# Patient Record
Sex: FEMALE | Race: WHITE | HISPANIC OR LATINO | URBAN - METROPOLITAN AREA
[De-identification: names, ages, dates, MRNs, and addresses within clinical notes are randomized per-mention and may not be internally consistent; named-entity substitution may affect disease eponyms.]

---

## 2017-05-03 ENCOUNTER — EMERGENCY (EMERGENCY)
Facility: HOSPITAL | Age: 77
LOS: 0 days | Discharge: HOME | End: 2017-05-04

## 2017-06-15 PROBLEM — Z00.00 ENCOUNTER FOR PREVENTIVE HEALTH EXAMINATION: Status: ACTIVE | Noted: 2017-06-15

## 2017-06-21 ENCOUNTER — APPOINTMENT (OUTPATIENT)
Dept: CARDIOLOGY | Facility: CLINIC | Age: 77
End: 2017-06-21

## 2017-06-28 DIAGNOSIS — E11.9 TYPE 2 DIABETES MELLITUS WITHOUT COMPLICATIONS: ICD-10-CM

## 2017-06-28 DIAGNOSIS — Z94.4 LIVER TRANSPLANT STATUS: ICD-10-CM

## 2017-06-28 DIAGNOSIS — R11.2 NAUSEA WITH VOMITING, UNSPECIFIED: ICD-10-CM

## 2017-06-28 DIAGNOSIS — Z79.4 LONG TERM (CURRENT) USE OF INSULIN: ICD-10-CM

## 2017-06-28 DIAGNOSIS — R63.0 ANOREXIA: ICD-10-CM

## 2017-06-28 DIAGNOSIS — I10 ESSENTIAL (PRIMARY) HYPERTENSION: ICD-10-CM

## 2017-06-28 DIAGNOSIS — E03.9 HYPOTHYROIDISM, UNSPECIFIED: ICD-10-CM

## 2017-06-28 DIAGNOSIS — I25.10 ATHEROSCLEROTIC HEART DISEASE OF NATIVE CORONARY ARTERY WITHOUT ANGINA PECTORIS: ICD-10-CM

## 2017-06-28 DIAGNOSIS — Z79.899 OTHER LONG TERM (CURRENT) DRUG THERAPY: ICD-10-CM

## 2017-06-28 DIAGNOSIS — R25.1 TREMOR, UNSPECIFIED: ICD-10-CM

## 2017-06-28 DIAGNOSIS — Z95.0 PRESENCE OF CARDIAC PACEMAKER: ICD-10-CM

## 2018-04-03 ENCOUNTER — APPOINTMENT (OUTPATIENT)
Dept: CARDIOLOGY | Facility: CLINIC | Age: 78
End: 2018-04-03

## 2018-04-03 VITALS
DIASTOLIC BLOOD PRESSURE: 90 MMHG | BODY MASS INDEX: 27.82 KG/M2 | HEIGHT: 63 IN | WEIGHT: 157 LBS | HEART RATE: 64 BPM | SYSTOLIC BLOOD PRESSURE: 128 MMHG

## 2018-04-03 DIAGNOSIS — I25.10 ATHEROSCLEROTIC HEART DISEASE OF NATIVE CORONARY ARTERY W/OUT ANGINA PECTORIS: ICD-10-CM

## 2018-04-03 DIAGNOSIS — Z95.0 PRESENCE OF CARDIAC PACEMAKER: ICD-10-CM

## 2018-04-03 RX ORDER — METOPROLOL TARTRATE 50 MG/1
50 TABLET, FILM COATED ORAL DAILY
Refills: 0 | Status: DISCONTINUED | COMMUNITY
End: 2018-04-03

## 2018-04-11 ENCOUNTER — EMERGENCY (EMERGENCY)
Facility: HOSPITAL | Age: 78
LOS: 0 days | Discharge: HOME | End: 2018-04-11
Attending: EMERGENCY MEDICINE

## 2018-04-11 VITALS
TEMPERATURE: 97 F | DIASTOLIC BLOOD PRESSURE: 77 MMHG | RESPIRATION RATE: 18 BRPM | OXYGEN SATURATION: 99 % | SYSTOLIC BLOOD PRESSURE: 167 MMHG | HEART RATE: 78 BPM

## 2018-04-11 VITALS
HEART RATE: 80 BPM | HEIGHT: 63 IN | RESPIRATION RATE: 18 BRPM | SYSTOLIC BLOOD PRESSURE: 194 MMHG | TEMPERATURE: 97 F | WEIGHT: 154.1 LBS | DIASTOLIC BLOOD PRESSURE: 92 MMHG
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
## 2018-04-11 DIAGNOSIS — S80.212A ABRASION, LEFT KNEE, INITIAL ENCOUNTER: ICD-10-CM

## 2018-04-11 DIAGNOSIS — E11.9 TYPE 2 DIABETES MELLITUS WITHOUT COMPLICATIONS: ICD-10-CM
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
## 2018-04-11 DIAGNOSIS — E03.9 HYPOTHYROIDISM, UNSPECIFIED: ICD-10-CM

## 2018-04-11 DIAGNOSIS — M79.642 PAIN IN LEFT HAND: ICD-10-CM

## 2018-04-11 DIAGNOSIS — I10 ESSENTIAL (PRIMARY) HYPERTENSION: ICD-10-CM

## 2018-04-11 DIAGNOSIS — Z79.4 LONG TERM (CURRENT) USE OF INSULIN: ICD-10-CM

## 2018-04-11 DIAGNOSIS — M25.512 PAIN IN LEFT SHOULDER: ICD-10-CM
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
## 2018-04-11 DIAGNOSIS — Z79.899 OTHER LONG TERM (CURRENT) DRUG THERAPY: ICD-10-CM

## 2018-04-11 NOTE — ED PROVIDER NOTE - PROGRESS NOTE DETAILS
I personally evaluated the patient. I reviewed the Resident’s or Physician Assistant’s note (as assigned above), and agree with the findings and plan except as documented in my note.  77yF hx of DM, pacemaker, liver transplant pt, presents to ED for eval s/p fall.  Pt states she was getting out of her car and she tripped over curb, fell to the left.  Pt landed on left sided and struck left face on ground.  No LOC.  No headache.  No nausea, vomiting.  No preceding dizziness or lightheadedness.  ON EXAM:  Pt is awake and alert, well appearing.  GCS 15.  PERRL, EOMI.  Ambulating with steady gait.  CVS RRR.  Resp CTA b/l.  No midline vertebral tenderness.  No stepoff.  No rib tenderness.  No ecchymosis to back wall, chest wall, or abd wall.  No shortening of LE.  No hip tenderness, pelvis is stable.  Full ROM at shoulders, elbows and wrists.  Tenderness over base of left thumb and dorsum of hand.  NVI.  PLAN:  Analgesia, CT, and re eval.

## 2018-04-11 NOTE — ED PROVIDER NOTE - NS ED ROS FT
Eyes:  No visual changes, eye pain or discharge.  ENMT:  , no sore throat or runny nose, no difficulty swallowing  Cardiac:  No chest pain, SOB  Respiratory:  No cough or respiratory distress.   GI:  No nausea, vomiting, diarrhea or abdominal pain.  :  No dysuria, frequency or burning.  MS:  left knee, left hand and shoulder pain, full range of motion able to ambulate   Neuro:  No headache, fell onto front of face, no loc. no weakness, no dizziness   Skin:  abrasion to the left knee

## 2018-04-11 NOTE — ED PROVIDER NOTE - OBJECTIVE STATEMENT
78 yo F pmh of DM, hypothyroid, liver transplant, HTN presents after a mechanical fall. was getting out of her car when she tripped on the curb and fell forward. Landed on left knee with abrasion, left shoulder and left hand. Also front of left face was hit, no skull injury, no loc. only on asprin. no dizziness, no cp, no sob, no blurry vision, no n/v, no abd pain, was able to ambulate since fall.

## 2018-04-11 NOTE — ED PROVIDER NOTE - PHYSICAL EXAMINATION
CONSTITUTIONAL: Well-developed; well-nourished; in no acute distress.   SKIN: non bleeding abrasion to the left knee   HEAD: Normocephalic; atraumatic. mild edema to the left maxilla  EYES: PERRL, EOMI, no conjunctival erythema  ENT: No nasal discharge; airway clear.  NECK: Supple; non tender.  CARD: S1, S2 normal;  Regular rate and rhythm.   RESP: No wheezes, rales or rhonchi.  ABD: soft ntnd  EXT: Normal ROM.  able to ambulate unasistent, full range of motion of left shoulder, left hand and left knee without point tenderness or swelling.   LYMPH: No acute cervical adenopathy.  NEURO: Alert, oriented, grossly unremarkable, CN 2-11 intact, 5/5 strength in all 4 extremities, equal sensation bilaterally

## 2018-05-01 ENCOUNTER — APPOINTMENT (OUTPATIENT)
Dept: CARDIOLOGY | Facility: CLINIC | Age: 78
End: 2018-05-01

## 2018-07-31 ENCOUNTER — APPOINTMENT (OUTPATIENT)
Dept: CARDIOLOGY | Facility: CLINIC | Age: 78
End: 2018-07-31

## 2019-07-09 PROBLEM — E03.9 HYPOTHYROIDISM, UNSPECIFIED: Chronic | Status: ACTIVE | Noted: 2018-04-11

## 2019-07-09 PROBLEM — E11.9 TYPE 2 DIABETES MELLITUS WITHOUT COMPLICATIONS: Chronic | Status: ACTIVE | Noted: 2018-04-11

## 2019-07-09 PROBLEM — Z94.4 LIVER TRANSPLANT STATUS: Chronic | Status: ACTIVE | Noted: 2018-04-11

## 2019-07-25 ENCOUNTER — APPOINTMENT (OUTPATIENT)
Dept: CARDIOLOGY | Facility: CLINIC | Age: 79
End: 2019-07-25
Payer: MEDICARE

## 2019-07-25 PROCEDURE — 93000 ELECTROCARDIOGRAM COMPLETE: CPT

## 2019-07-25 PROCEDURE — 99204 OFFICE O/P NEW MOD 45 MIN: CPT

## 2019-07-29 ENCOUNTER — APPOINTMENT (OUTPATIENT)
Dept: CARDIOLOGY | Facility: CLINIC | Age: 79
End: 2019-07-29

## 2019-07-31 ENCOUNTER — APPOINTMENT (OUTPATIENT)
Dept: CARDIOLOGY | Facility: CLINIC | Age: 79
End: 2019-07-31
Payer: MEDICARE

## 2019-07-31 PROCEDURE — 93306 TTE W/DOPPLER COMPLETE: CPT

## 2019-08-14 ENCOUNTER — OUTPATIENT (OUTPATIENT)
Dept: OUTPATIENT SERVICES | Facility: HOSPITAL | Age: 79
LOS: 1 days | Discharge: HOME | End: 2019-08-14
Payer: MEDICARE

## 2019-08-14 VITALS
WEIGHT: 141.1 LBS | OXYGEN SATURATION: 100 % | HEIGHT: 62 IN | DIASTOLIC BLOOD PRESSURE: 81 MMHG | SYSTOLIC BLOOD PRESSURE: 147 MMHG | RESPIRATION RATE: 20 BRPM | HEART RATE: 90 BPM | TEMPERATURE: 98 F

## 2019-08-14 DIAGNOSIS — Z95.0 PRESENCE OF CARDIAC PACEMAKER: ICD-10-CM

## 2019-08-14 DIAGNOSIS — Z94.4 LIVER TRANSPLANT STATUS: Chronic | ICD-10-CM

## 2019-08-14 DIAGNOSIS — Z01.818 ENCOUNTER FOR OTHER PREPROCEDURAL EXAMINATION: ICD-10-CM

## 2019-08-14 DIAGNOSIS — Z95.0 PRESENCE OF CARDIAC PACEMAKER: Chronic | ICD-10-CM

## 2019-08-14 LAB
ALBUMIN SERPL ELPH-MCNC: 4.5 G/DL — SIGNIFICANT CHANGE UP (ref 3.5–5.2)
ALP SERPL-CCNC: 91 U/L — SIGNIFICANT CHANGE UP (ref 30–115)
ALT FLD-CCNC: 10 U/L — SIGNIFICANT CHANGE UP (ref 0–41)
ANION GAP SERPL CALC-SCNC: 11 MMOL/L — SIGNIFICANT CHANGE UP (ref 7–14)
APTT BLD: 29 SEC — SIGNIFICANT CHANGE UP (ref 27–39.2)
AST SERPL-CCNC: 14 U/L — SIGNIFICANT CHANGE UP (ref 0–41)
BASOPHILS # BLD AUTO: 0.03 K/UL — SIGNIFICANT CHANGE UP (ref 0–0.2)
BASOPHILS NFR BLD AUTO: 0.4 % — SIGNIFICANT CHANGE UP (ref 0–1)
BILIRUB SERPL-MCNC: 0.5 MG/DL — SIGNIFICANT CHANGE UP (ref 0.2–1.2)
BUN SERPL-MCNC: 21 MG/DL — HIGH (ref 10–20)
CALCIUM SERPL-MCNC: 10.7 MG/DL — HIGH (ref 8.5–10.1)
CHLORIDE SERPL-SCNC: 99 MMOL/L — SIGNIFICANT CHANGE UP (ref 98–110)
CO2 SERPL-SCNC: 29 MMOL/L — SIGNIFICANT CHANGE UP (ref 17–32)
CREAT SERPL-MCNC: 1 MG/DL — SIGNIFICANT CHANGE UP (ref 0.7–1.5)
EOSINOPHIL # BLD AUTO: 0.33 K/UL — SIGNIFICANT CHANGE UP (ref 0–0.7)
EOSINOPHIL NFR BLD AUTO: 4.2 % — SIGNIFICANT CHANGE UP (ref 0–8)
ESTIMATED AVERAGE GLUCOSE: 183 MG/DL — HIGH (ref 68–114)
GLUCOSE SERPL-MCNC: 152 MG/DL — HIGH (ref 70–99)
HBA1C BLD-MCNC: 8 % — HIGH (ref 4–5.6)
HCT VFR BLD CALC: 40.1 % — SIGNIFICANT CHANGE UP (ref 37–47)
HGB BLD-MCNC: 13.1 G/DL — SIGNIFICANT CHANGE UP (ref 12–16)
IMM GRANULOCYTES NFR BLD AUTO: 0.3 % — SIGNIFICANT CHANGE UP (ref 0.1–0.3)
INR BLD: 1.1 RATIO — SIGNIFICANT CHANGE UP (ref 0.65–1.3)
LYMPHOCYTES # BLD AUTO: 1.93 K/UL — SIGNIFICANT CHANGE UP (ref 1.2–3.4)
LYMPHOCYTES # BLD AUTO: 24.7 % — SIGNIFICANT CHANGE UP (ref 20.5–51.1)
MCHC RBC-ENTMCNC: 28.5 PG — SIGNIFICANT CHANGE UP (ref 27–31)
MCHC RBC-ENTMCNC: 32.7 G/DL — SIGNIFICANT CHANGE UP (ref 32–37)
MCV RBC AUTO: 87.2 FL — SIGNIFICANT CHANGE UP (ref 81–99)
MONOCYTES # BLD AUTO: 0.73 K/UL — HIGH (ref 0.1–0.6)
MONOCYTES NFR BLD AUTO: 9.3 % — SIGNIFICANT CHANGE UP (ref 1.7–9.3)
NEUTROPHILS # BLD AUTO: 4.78 K/UL — SIGNIFICANT CHANGE UP (ref 1.4–6.5)
NEUTROPHILS NFR BLD AUTO: 61.1 % — SIGNIFICANT CHANGE UP (ref 42.2–75.2)
NRBC # BLD: 0 /100 WBCS — SIGNIFICANT CHANGE UP (ref 0–0)
PLATELET # BLD AUTO: 170 K/UL — SIGNIFICANT CHANGE UP (ref 130–400)
POTASSIUM SERPL-MCNC: 5.1 MMOL/L — HIGH (ref 3.5–5)
POTASSIUM SERPL-SCNC: 5.1 MMOL/L — HIGH (ref 3.5–5)
PROT SERPL-MCNC: 7.4 G/DL — SIGNIFICANT CHANGE UP (ref 6–8)
PROTHROM AB SERPL-ACNC: 12.6 SEC — SIGNIFICANT CHANGE UP (ref 9.95–12.87)
RBC # BLD: 4.6 M/UL — SIGNIFICANT CHANGE UP (ref 4.2–5.4)
RBC # FLD: 14.6 % — HIGH (ref 11.5–14.5)
SODIUM SERPL-SCNC: 139 MMOL/L — SIGNIFICANT CHANGE UP (ref 135–146)
WBC # BLD: 7.82 K/UL — SIGNIFICANT CHANGE UP (ref 4.8–10.8)
WBC # FLD AUTO: 7.82 K/UL — SIGNIFICANT CHANGE UP (ref 4.8–10.8)

## 2019-08-14 PROCEDURE — 93010 ELECTROCARDIOGRAM REPORT: CPT

## 2019-08-14 RX ORDER — TACROLIMUS 5 MG/1
1 CAPSULE ORAL
Qty: 0 | Refills: 0 | DISCHARGE

## 2019-08-14 RX ORDER — METOPROLOL TARTRATE 50 MG
0 TABLET ORAL
Qty: 0 | Refills: 0 | DISCHARGE

## 2019-08-14 RX ORDER — LOSARTAN POTASSIUM 100 MG/1
1 TABLET, FILM COATED ORAL
Qty: 0 | Refills: 0 | DISCHARGE

## 2019-08-14 RX ORDER — INSULIN ASPART 100 [IU]/ML
0 INJECTION, SOLUTION SUBCUTANEOUS
Qty: 0 | Refills: 0 | DISCHARGE

## 2019-08-14 RX ORDER — LEVOTHYROXINE SODIUM 125 MCG
1 TABLET ORAL
Qty: 0 | Refills: 0 | DISCHARGE

## 2019-08-14 RX ORDER — INSULIN GLARGINE 100 [IU]/ML
4 INJECTION, SOLUTION SUBCUTANEOUS
Qty: 0 | Refills: 0 | DISCHARGE

## 2019-08-14 RX ORDER — INSULIN GLARGINE 100 [IU]/ML
0 INJECTION, SOLUTION SUBCUTANEOUS
Qty: 0 | Refills: 0 | DISCHARGE

## 2019-08-14 NOTE — H&P PST ADULT - NSICDXPASTSURGICALHX_GEN_ALL_CORE_FT
PAST SURGICAL HISTORY:  No significant past surgical history PAST SURGICAL HISTORY:  Liver transplanted     Pacemaker medtronic

## 2019-08-14 NOTE — H&P PST ADULT - HISTORY OF PRESENT ILLNESS
78 y/o female scheduled for left heart cath pt reports h/o abnormal stress test  reports no c/o cp,sob,palpitations,cough or dysria  1-2 fos without sob

## 2019-08-14 NOTE — H&P PST ADULT - NSICDXPASTMEDICALHX_GEN_ALL_CORE_FT
PAST MEDICAL HISTORY:  Diabetes     Hypothyroid     Liver transplant status PAST MEDICAL HISTORY:  Diabetes     HTN (hypertension)     Hypothyroid     Liver transplant status     Myocardial infarction     SOB (shortness of breath)

## 2019-09-02 ENCOUNTER — EMERGENCY (EMERGENCY)
Facility: HOSPITAL | Age: 79
LOS: 0 days | Discharge: HOME | End: 2019-09-02
Attending: EMERGENCY MEDICINE | Admitting: EMERGENCY MEDICINE
Payer: MEDICARE

## 2019-09-02 VITALS
OXYGEN SATURATION: 98 % | HEIGHT: 63 IN | SYSTOLIC BLOOD PRESSURE: 177 MMHG | DIASTOLIC BLOOD PRESSURE: 84 MMHG | RESPIRATION RATE: 16 BRPM | WEIGHT: 139.99 LBS | HEART RATE: 85 BPM | TEMPERATURE: 96 F

## 2019-09-02 DIAGNOSIS — Z95.0 PRESENCE OF CARDIAC PACEMAKER: Chronic | ICD-10-CM

## 2019-09-02 DIAGNOSIS — Z94.4 LIVER TRANSPLANT STATUS: Chronic | ICD-10-CM

## 2019-09-02 DIAGNOSIS — Z95.5 PRESENCE OF CORONARY ANGIOPLASTY IMPLANT AND GRAFT: Chronic | ICD-10-CM

## 2019-09-02 DIAGNOSIS — I25.10 ATHEROSCLEROTIC HEART DISEASE OF NATIVE CORONARY ARTERY WITHOUT ANGINA PECTORIS: ICD-10-CM

## 2019-09-02 DIAGNOSIS — R07.89 OTHER CHEST PAIN: ICD-10-CM

## 2019-09-02 DIAGNOSIS — R07.9 CHEST PAIN, UNSPECIFIED: ICD-10-CM

## 2019-09-02 DIAGNOSIS — I10 ESSENTIAL (PRIMARY) HYPERTENSION: ICD-10-CM

## 2019-09-02 DIAGNOSIS — E11.9 TYPE 2 DIABETES MELLITUS WITHOUT COMPLICATIONS: ICD-10-CM

## 2019-09-02 DIAGNOSIS — Z95.5 PRESENCE OF CORONARY ANGIOPLASTY IMPLANT AND GRAFT: ICD-10-CM

## 2019-09-02 PROBLEM — R06.02 SHORTNESS OF BREATH: Chronic | Status: ACTIVE | Noted: 2019-08-14

## 2019-09-02 LAB
ALBUMIN SERPL ELPH-MCNC: 4.2 G/DL — SIGNIFICANT CHANGE UP (ref 3.5–5.2)
ALP SERPL-CCNC: 96 U/L — SIGNIFICANT CHANGE UP (ref 30–115)
ALT FLD-CCNC: 12 U/L — SIGNIFICANT CHANGE UP (ref 0–41)
ANION GAP SERPL CALC-SCNC: 14 MMOL/L — SIGNIFICANT CHANGE UP (ref 7–14)
APTT BLD: 23.6 SEC — CRITICAL LOW (ref 27–39.2)
AST SERPL-CCNC: 19 U/L — SIGNIFICANT CHANGE UP (ref 0–41)
BASOPHILS # BLD AUTO: 0.03 K/UL — SIGNIFICANT CHANGE UP (ref 0–0.2)
BASOPHILS NFR BLD AUTO: 0.4 % — SIGNIFICANT CHANGE UP (ref 0–1)
BILIRUB SERPL-MCNC: 0.5 MG/DL — SIGNIFICANT CHANGE UP (ref 0.2–1.2)
BUN SERPL-MCNC: 16 MG/DL — SIGNIFICANT CHANGE UP (ref 10–20)
CALCIUM SERPL-MCNC: 10.2 MG/DL — HIGH (ref 8.5–10.1)
CHLORIDE SERPL-SCNC: 96 MMOL/L — LOW (ref 98–110)
CO2 SERPL-SCNC: 25 MMOL/L — SIGNIFICANT CHANGE UP (ref 17–32)
CREAT SERPL-MCNC: 1 MG/DL — SIGNIFICANT CHANGE UP (ref 0.7–1.5)
EOSINOPHIL # BLD AUTO: 0.27 K/UL — SIGNIFICANT CHANGE UP (ref 0–0.7)
EOSINOPHIL NFR BLD AUTO: 3.5 % — SIGNIFICANT CHANGE UP (ref 0–8)
GLUCOSE SERPL-MCNC: 141 MG/DL — HIGH (ref 70–99)
HCT VFR BLD CALC: 41.2 % — SIGNIFICANT CHANGE UP (ref 37–47)
HGB BLD-MCNC: 13.3 G/DL — SIGNIFICANT CHANGE UP (ref 12–16)
IMM GRANULOCYTES NFR BLD AUTO: 0.4 % — HIGH (ref 0.1–0.3)
INR BLD: 1.13 RATIO — SIGNIFICANT CHANGE UP (ref 0.65–1.3)
LYMPHOCYTES # BLD AUTO: 1.87 K/UL — SIGNIFICANT CHANGE UP (ref 1.2–3.4)
LYMPHOCYTES # BLD AUTO: 24.3 % — SIGNIFICANT CHANGE UP (ref 20.5–51.1)
MAGNESIUM SERPL-MCNC: 1.6 MG/DL — LOW (ref 1.8–2.4)
MCHC RBC-ENTMCNC: 28.6 PG — SIGNIFICANT CHANGE UP (ref 27–31)
MCHC RBC-ENTMCNC: 32.3 G/DL — SIGNIFICANT CHANGE UP (ref 32–37)
MCV RBC AUTO: 88.6 FL — SIGNIFICANT CHANGE UP (ref 81–99)
MONOCYTES # BLD AUTO: 0.64 K/UL — HIGH (ref 0.1–0.6)
MONOCYTES NFR BLD AUTO: 8.3 % — SIGNIFICANT CHANGE UP (ref 1.7–9.3)
NEUTROPHILS # BLD AUTO: 4.87 K/UL — SIGNIFICANT CHANGE UP (ref 1.4–6.5)
NEUTROPHILS NFR BLD AUTO: 63.1 % — SIGNIFICANT CHANGE UP (ref 42.2–75.2)
NRBC # BLD: 0 /100 WBCS — SIGNIFICANT CHANGE UP (ref 0–0)
NT-PROBNP SERPL-SCNC: 1562 PG/ML — HIGH (ref 0–300)
PLATELET # BLD AUTO: 207 K/UL — SIGNIFICANT CHANGE UP (ref 130–400)
POTASSIUM SERPL-MCNC: 4.9 MMOL/L — SIGNIFICANT CHANGE UP (ref 3.5–5)
POTASSIUM SERPL-SCNC: 4.9 MMOL/L — SIGNIFICANT CHANGE UP (ref 3.5–5)
PROT SERPL-MCNC: 7.5 G/DL — SIGNIFICANT CHANGE UP (ref 6–8)
PROTHROM AB SERPL-ACNC: 13 SEC — HIGH (ref 9.95–12.87)
RBC # BLD: 4.65 M/UL — SIGNIFICANT CHANGE UP (ref 4.2–5.4)
RBC # FLD: 14.4 % — SIGNIFICANT CHANGE UP (ref 11.5–14.5)
SODIUM SERPL-SCNC: 135 MMOL/L — SIGNIFICANT CHANGE UP (ref 135–146)
TROPONIN T SERPL-MCNC: <0.01 NG/ML — SIGNIFICANT CHANGE UP
TROPONIN T SERPL-MCNC: <0.01 NG/ML — SIGNIFICANT CHANGE UP
WBC # BLD: 7.71 K/UL — SIGNIFICANT CHANGE UP (ref 4.8–10.8)
WBC # FLD AUTO: 7.71 K/UL — SIGNIFICANT CHANGE UP (ref 4.8–10.8)

## 2019-09-02 PROCEDURE — 93010 ELECTROCARDIOGRAM REPORT: CPT

## 2019-09-02 PROCEDURE — 71046 X-RAY EXAM CHEST 2 VIEWS: CPT | Mod: 26

## 2019-09-02 PROCEDURE — 99222 1ST HOSP IP/OBS MODERATE 55: CPT

## 2019-09-02 PROCEDURE — 99285 EMERGENCY DEPT VISIT HI MDM: CPT

## 2019-09-02 NOTE — ED ADULT NURSE NOTE - NSIMPLEMENTINTERV_GEN_ALL_ED
Implemented All Universal Safety Interventions:  [place] to call system. Call bell, personal items and telephone within reach. Instruct patient to call for assistance. Room bathroom lighting operational. Non-slip footwear when patient is off stretcher. Physically safe environment: no spills, clutter or unnecessary equipment. Stretcher in lowest position, wheels locked, appropriate side rails in place.

## 2019-09-02 NOTE — CONSULT NOTE ADULT - SUBJECTIVE AND OBJECTIVE BOX
Date of Admission: 9/2/2019    CHIEF COMPLAINT: Chest pain    HISTORY OF PRESENT ILLNESS:     PAST MEDICAL & SURGICAL HISTORY:  Myocardial infarction  HTN (hypertension)  SOB (shortness of breath)  Hypothyroid  Liver transplant status  Diabetes  H/O heart artery stent  Pacemaker: medtronic  Liver transplanted      FAMILY HISTORY:  [ ] no pertinent family history of premature cardiovascular disease in first degree relatives.  Mother:   Father:   Siblings:     SOCIAL HISTORY:    [ ] Non-smoker  [x] Smoker. Former smoker, quit 12 years ago.  [ ] Alcohol    Allergies    No Known Allergies    Intolerances    	    REVIEW OF SYSTEMS:  CONSTITUTIONAL: denies fever, weight loss, or fatigue  CARDIOLOGY: denies chest pain, shortness of breath or syncopal episodes.   RESPIRATORY: denies shortness of breath, wheezing.   NEUROLOGICAL: denies weakness, no focal deficits to report.  ENDOCRINOLOGICAL: no recent change in diabetic medications.   GI: no BRBPR, no N,V, diarrhea.    PSYCHIATRY: normal mood and affect  HEENT: no nasal discharge, no ecchymosis  SKIN: no ecchymosis, no breakdown  MUSCULOSKELETAL: Full range of motion x4.     PHYSICAL EXAM:  T(C): 35.8 (09-02-19 @ 13:13), Max: 35.8 (09-02-19 @ 13:13)  HR: 85 (09-02-19 @ 13:13) (85 - 85)  BP: 177/84 (09-02-19 @ 13:13) (177/84 - 177/84)  RR: 16 (09-02-19 @ 13:13) (16 - 16)  SpO2: 98% (09-02-19 @ 13:13) (98% - 98%)  Wt(kg): --  I&O's Summary      General Appearance: well appearing, normal for age and gender. 	  Neck: normal JVP, no bruit.   Eyes: No xanthomalasia, Extra Ocular muscles intact.   Cardiovascular: regular rate and rhythm S1 S2, No JVD, No murmurs, No edema  Respiratory: Lungs clear to auscultation	  Psychiatry: Alert and oriented x 3, Mood & affect appropriate  Gastrointestinal:  Soft, Non-tender  Skin/Integumen: No rashes, No ecchymoses, No cyanosis	  Neurologic: Non-focal  Musculoskeletal/extremities: Normal range of motion, No clubbing, cyanosis or edema  Vascular: Peripheral pulses palpable 2+ bilaterally    LABS:	 	                          13.3   7.71  )-----------( 207      ( 02 Sep 2019 13:50 )             41.2     09-02    135  |  96<L>  |  16  ----------------------------<  141<H>  4.9   |  25  |  1.0    Ca    10.2<H>      02 Sep 2019 13:50  Mg     1.6     09-02    TPro  7.5  /  Alb  4.2  /  TBili  0.5  /  DBili  x   /  AST  19  /  ALT  12  /  AlkPhos  96  09-02    CARDIAC MARKERS ( 02 Sep 2019 13:50 )  x     / <0.01 ng/mL / x     / x     / x          PT/INR - ( 02 Sep 2019 13:50 )   PT: 13.00 sec;   INR: 1.13 ratio         PTT - ( 02 Sep 2019 13:50 )  PTT:23.6 sec          TELEMETRY EVENTS: 	    ECG:  	  RADIOLOGY:  OTHER: 	    PREVIOUS DIAGNOSTIC TESTING:    [ ] Echocardiogram: [facility] on 7/31/2019  Normal LV systolic function. EF 67.5%. Pulmonary HTN. Moderate MR. Moderate TR. LA dilated  [ ] Catheterization:  [ ] Stress Test:  Lexiscan 5/2019  Moderate fixed apical defect  	    Home Medications:  Ambien 5 mg oral tablet: 1 tab(s) orally once a day (at bedtime) (14 Aug 2019 11:26)  aspirin 81 mg oral tablet: 1 tab(s) orally once a day (14 Aug 2019 11:26)  insulin aspart: 4  subcutaneous 3 times a day (before meals) (14 Aug 2019 11:26)  Lantus: 18 unit(s) subcutaneous once a day (at bedtime) (14 Aug 2019 11:26)  Lopressor 50 mg oral tablet: 1 tab(s) orally 2 times a day (14 Aug 2019 11:26)  Prograf 1 mg oral capsule: 1 cap(s) orally every 12 hours (14 Aug 2019 11:26)  Synthroid 112 mcg (0.112 mg) oral tablet: 1 tab(s) orally once a day (14 Aug 2019 11:26)    MEDICATIONS  (STANDING):    MEDICATIONS  (PRN): Date of Admission: 9/2/2019    CHIEF COMPLAINT: HESS    HISTORY OF PRESENT ILLNESS: 78 yo female with CAD s/p PCI,  over may years (unknown which vessels), last PCI> 5 years ago, PPM complicated by pericardial tamponade s/p pericardiocentesis presenting for dyspnea on exertion. She was seeing Dr Portillo in the office and he had planned her for a cardiac cath. based on an abnormal NST done in [place] in May 2019. Patient did not want to come the ED but her daughter forced her to do so. Denies chest pain, palpitations, dizziness. She admits that prior to her previous stents, her symptoms were not chest pain but HESS.     PAST MEDICAL & SURGICAL HISTORY:  Myocardial infarction  HTN (hypertension)  SOB (shortness of breath)  Hypothyroid  Liver transplant status  Diabetes  H/O heart artery stent  Pacemaker: medtronic  Liver transplanted      FAMILY HISTORY:  [ ] no pertinent family history of premature cardiovascular disease in first degree relatives.  Mother:   Father:   Siblings: sister MI    SOCIAL HISTORY:    [ ] Non-smoker  [x] Smoker. Former smoker, quit 12 years ago.  [ ] Alcohol    Allergies    No Known Allergies    Intolerances    	    REVIEW OF SYSTEMS:  CONSTITUTIONAL: denies fever, weight loss, or fatigue  CARDIOLOGY: denies chest pain or syncopal episodes.   RESPIRATORY: HESS  NEUROLOGICAL: denies weakness, no focal deficits to report.  ENDOCRINOLOGICAL: no recent change in diabetic medications.   GI: no BRBPR, no N,V, diarrhea.    PSYCHIATRY: normal mood and affect  HEENT: no nasal discharge, no ecchymosis  SKIN: no ecchymosis, no breakdown  MUSCULOSKELETAL: Full range of motion x4.     PHYSICAL EXAM:  T(C): 35.8 (09-02-19 @ 13:13), Max: 35.8 (09-02-19 @ 13:13)  HR: 85 (09-02-19 @ 13:13) (85 - 85)  BP: 177/84 (09-02-19 @ 13:13) (177/84 - 177/84)  RR: 16 (09-02-19 @ 13:13) (16 - 16)  SpO2: 98% (09-02-19 @ 13:13) (98% - 98%)  Wt(kg): --  I&O's Summary      General Appearance: well appearing, normal for age and gender. 	  Neck: normal JVP, no bruit.   Eyes: No xanthomalasia, Extra Ocular muscles intact.   Cardiovascular: regular rate and rhythm S1 S2, No JVD, No murmurs, No edema  Respiratory: Lungs clear to auscultation	  Psychiatry: Alert and oriented x 3, Mood & affect appropriate  Gastrointestinal:  Soft, Non-tender  Skin/Integumen: No rashes, No ecchymoses, No cyanosis	  Neurologic: Non-focal  Musculoskeletal/extremities: Normal range of motion, No clubbing, cyanosis or edema  Vascular: Peripheral pulses palpable 2+ bilaterally    LABS:	 	                          13.3   7.71  )-----------( 207      ( 02 Sep 2019 13:50 )             41.2     09-02    135  |  96<L>  |  16  ----------------------------<  141<H>  4.9   |  25  |  1.0    Ca    10.2<H>      02 Sep 2019 13:50  Mg     1.6     09-02    TPro  7.5  /  Alb  4.2  /  TBili  0.5  /  DBili  x   /  AST  19  /  ALT  12  /  AlkPhos  96  09-02    CARDIAC MARKERS ( 02 Sep 2019 13:50 )  x     / <0.01 ng/mL / x     / x     / x          PT/INR - ( 02 Sep 2019 13:50 )   PT: 13.00 sec;   INR: 1.13 ratio         PTT - ( 02 Sep 2019 13:50 )  PTT:23.6 sec          TELEMETRY EVENTS: 	    ECG:  	Atrial paced rhythm. Non-specific T-wave changes  RADIOLOGY:   OTHER: 	    PREVIOUS DIAGNOSTIC TESTING:    [ ] Echocardiogram: [facility] on 7/31/2019  Normal LV systolic function. EF 67.5%. Pulmonary HTN. Moderate MR. Moderate TR. LA dilated  [ ] Catheterization:  [ ] Stress Test:  Lexiscan 5/2019  Moderate fixed apical defect  	    Home Medications:  Ambien 5 mg oral tablet: 1 tab(s) orally once a day (at bedtime) (14 Aug 2019 11:26)  aspirin 81 mg oral tablet: 1 tab(s) orally once a day (14 Aug 2019 11:26)  insulin aspart: 4  subcutaneous 3 times a day (before meals) (14 Aug 2019 11:26)  Lantus: 18 unit(s) subcutaneous once a day (at bedtime) (14 Aug 2019 11:26)  Lopressor 50 mg oral tablet: 1 tab(s) orally 2 times a day (14 Aug 2019 11:26)  Prograf 1 mg oral capsule: 1 cap(s) orally every 12 hours (14 Aug 2019 11:26)  Synthroid 112 mcg (0.112 mg) oral tablet: 1 tab(s) orally once a day (14 Aug 2019 11:26)    MEDICATIONS  (STANDING):    MEDICATIONS  (PRN): Date of Admission: 9/2/2019    CHIEF COMPLAINT: HESS    HISTORY OF PRESENT ILLNESS: 80 yo female with CAD s/p PCI,  over may years (unknown which vessels), last PCI> 5 years ago, PPM complicated by pericardial tamponade s/p pericardiocentesis presenting for dyspnea on exertion. She was seeing Dr Portillo in the office and he had planned her for a cardiac cath. based on an abnormal NST done in [place] in May 2019. Patient did not want to come the ED but her daughter forced her to do so. Denies chest pain, palpitations, dizziness. She admits that prior to her previous stents, her symptoms were not chest pain but HESS.     PAST MEDICAL & SURGICAL HISTORY:  Myocardial infarction  HTN (hypertension)  SOB (shortness of breath)  Hypothyroid  Liver transplant status  Diabetes  H/O heart artery stent  Pacemaker: medtronic  Liver transplanted      FAMILY HISTORY:  [ ] no pertinent family history of premature cardiovascular disease in first degree relatives.  Mother:   Father:   Siblings: sister MI    SOCIAL HISTORY:    [ ] Non-smoker  [x] Smoker. Former smoker, quit 12 years ago.  [ ] Alcohol    Allergies    No Known Allergies    Intolerances    	    REVIEW OF SYSTEMS:  CONSTITUTIONAL: denies fever, weight loss, or fatigue  CARDIOLOGY: denies chest pain or syncopal episodes.   RESPIRATORY: HESS  NEUROLOGICAL: denies weakness, no focal deficits to report.  ENDOCRINOLOGICAL: no recent change in diabetic medications.   GI: no BRBPR, no N,V, diarrhea.    PSYCHIATRY: normal mood and affect  HEENT: no nasal discharge, no ecchymosis  SKIN: no ecchymosis, no breakdown  MUSCULOSKELETAL: Full range of motion x4.     PHYSICAL EXAM:  T(C): 35.8 (09-02-19 @ 13:13), Max: 35.8 (09-02-19 @ 13:13)  HR: 85 (09-02-19 @ 13:13) (85 - 85)  BP: 177/84 (09-02-19 @ 13:13) (177/84 - 177/84)  RR: 16 (09-02-19 @ 13:13) (16 - 16)  SpO2: 98% (09-02-19 @ 13:13) (98% - 98%)  Wt(kg): --  I&O's Summary      General Appearance: well appearing, normal for age and gender. 	  Neck: normal JVP, no bruit.   Eyes: No xanthomalasia, Extra Ocular muscles intact.   Cardiovascular: regular rate and rhythm S1 S2, No JVD, No murmurs, No edema  Respiratory: Lungs clear to auscultation	  Psychiatry: Alert and oriented x 3, Mood & affect appropriate  Gastrointestinal:  Soft, Non-tender  Skin/Integumen: No rashes, No ecchymoses, No cyanosis	  Neurologic: Non-focal  Musculoskeletal/extremities: Normal range of motion, No clubbing, cyanosis or edema  Vascular: Peripheral pulses palpable 2+ bilaterally    LABS:	 	                          13.3   7.71  )-----------( 207      ( 02 Sep 2019 13:50 )             41.2     09-02    135  |  96<L>  |  16  ----------------------------<  141<H>  4.9   |  25  |  1.0    Ca    10.2<H>      02 Sep 2019 13:50  Mg     1.6     09-02    TPro  7.5  /  Alb  4.2  /  TBili  0.5  /  DBili  x   /  AST  19  /  ALT  12  /  AlkPhos  96  09-02    CARDIAC MARKERS ( 02 Sep 2019 13:50 )  x     / <0.01 ng/mL / x     / x     / x          PT/INR - ( 02 Sep 2019 13:50 )   PT: 13.00 sec;   INR: 1.13 ratio         PTT - ( 02 Sep 2019 13:50 )  PTT:23.6 sec          TELEMETRY EVENTS: 	    ECG:  	Atrial paced rhythm. Non-specific T-wave changes  RADIOLOGY:   OTHER: 	    PREVIOUS DIAGNOSTIC TESTING:    [ ] Echocardiogram: [facility] on 7/31/2019  Normal LV systolic function. EF 67.5%. Pulmonary HTN. Moderate MR. Moderate TR. LA dilated  [ ] Catheterization:  [ ] Stress Test:  Lexiscan 5/2019  Moderate fixed apical defect  	    Home Medications:  Ambien 5 mg oral tablet: 1 tab(s) orally once a day (at bedtime) (14 Aug 2019 11:26)  aspirin 81 mg oral tablet: 1 tab(s) orally once a day (14 Aug 2019 11:26)  Plavix 75 mg once daily  Nifedipine 30 XL once daily  Torsemide 100 mg once daily  insulin aspart: 4  subcutaneous 3 times a day (before meals) (14 Aug 2019 11:26)  Lantus: 18 unit(s) subcutaneous once a day (at bedtime) (14 Aug 2019 11:26)  Lopressor 50 mg oral tablet: 1 tab(s) orally 2 times a day (14 Aug 2019 11:26)  Prograf 1 mg oral capsule: 1 cap(s) orally every 12 hours (14 Aug 2019 11:26)  Synthroid 112 mcg (0.112 mg) oral tablet: 1 tab(s) orally once a day (14 Aug 2019 11:26)    MEDICATIONS  (STANDING):    MEDICATIONS  (PRN): Date of Admission: 9/2/2019    CHIEF COMPLAINT: HESS    HISTORY OF PRESENT ILLNESS: 80 yo female with CAD s/p PCI,  over may years (unknown which vessels), last PCI> 5 years ago, PPM complicated by pericardial tamponade s/p pericardiocentesis presenting for dyspnea on exertion. She was seeing Dr Portillo in the office and he had planned her for a cardiac cath. based on an abnormal NST done in [place] in May 2019. Patient did not want to come the ED but her daughter forced her to do so. Denies chest pain, palpitations, dizziness. She admits that prior to her previous stents, her symptoms were not chest pain but HESS.     PAST MEDICAL & SURGICAL HISTORY:  Myocardial infarction  HTN (hypertension)  SOB (shortness of breath)  Hypothyroid  Liver transplant status  Diabetes  H/O heart artery stent  Pacemaker: medtronic  Liver transplanted      FAMILY HISTORY:  [ ] no pertinent family history of premature cardiovascular disease in first degree relatives.  Mother:   Father:   Siblings: sister MI    SOCIAL HISTORY:    [ ] Non-smoker  [x] Smoker. Former smoker, quit 12 years ago.  [ ] Alcohol    Allergies    No Known Allergies    Intolerances    	    REVIEW OF SYSTEMS:  CONSTITUTIONAL: denies fever, weight loss, or fatigue  CARDIOLOGY: denies chest pain or syncopal episodes.   RESPIRATORY: HESS  NEUROLOGICAL: denies weakness, no focal deficits to report.  ENDOCRINOLOGICAL: no recent change in diabetic medications.   GI: no BRBPR, no N,V, diarrhea.    PSYCHIATRY: normal mood and affect  HEENT: no nasal discharge, no ecchymosis  SKIN: no ecchymosis, no breakdown  MUSCULOSKELETAL: Full range of motion x4.     PHYSICAL EXAM:  T(C): 35.8 (09-02-19 @ 13:13), Max: 35.8 (09-02-19 @ 13:13)  HR: 85 (09-02-19 @ 13:13) (85 - 85)  BP: 177/84 (09-02-19 @ 13:13) (177/84 - 177/84)  RR: 16 (09-02-19 @ 13:13) (16 - 16)  SpO2: 98% (09-02-19 @ 13:13) (98% - 98%)  Wt(kg): --  I&O's Summary      General Appearance: well appearing, normal for age and gender. 	  Neck: normal JVP, no bruit.   Eyes: No xanthomalasia, Extra Ocular muscles intact.   Cardiovascular: regular rate and rhythm S1 S2, No JVD, No murmurs, No edema  Respiratory: Lungs clear to auscultation	  Psychiatry: Alert and oriented x 3, Mood & affect appropriate  Gastrointestinal:  Soft, Non-tender  Skin/Integumen: No rashes, No ecchymoses, No cyanosis	  Neurologic: Non-focal  Musculoskeletal/extremities: Normal range of motion, No clubbing, cyanosis or edema  Vascular: Peripheral pulses palpable 2+ bilaterally    LABS:	 	                          13.3   7.71  )-----------( 207      ( 02 Sep 2019 13:50 )             41.2     09-02    135  |  96<L>  |  16  ----------------------------<  141<H>  4.9   |  25  |  1.0    Ca    10.2<H>      02 Sep 2019 13:50  Mg     1.6     09-02    TPro  7.5  /  Alb  4.2  /  TBili  0.5  /  DBili  x   /  AST  19  /  ALT  12  /  AlkPhos  96  09-02    CARDIAC MARKERS ( 02 Sep 2019 13:50 )  x     / <0.01 ng/mL / x     / x     / x          PT/INR - ( 02 Sep 2019 13:50 )   PT: 13.00 sec;   INR: 1.13 ratio         PTT - ( 02 Sep 2019 13:50 )  PTT:23.6 sec          TELEMETRY EVENTS: 	    ECG:  	Atrial paced rhythm. Non-specific T-wave changes  RADIOLOGY:   OTHER: 	    PREVIOUS DIAGNOSTIC TESTING:    [ ] Echocardiogram: [facility] on 7/31/2019  Normal LV systolic function. EF 67.5%. Pulmonary HTN. Moderate MR. Moderate TR. LA dilated  [ ] Catheterization:  [ ] Stress Test:  Lexiscan 5/2019  Moderate fixed apical defect  	    Home Medications:  Ambien 5 mg oral tablet: 1 tab(s) orally once a day (at bedtime) (14 Aug 2019 11:26)  aspirin 81 mg oral tablet: 1 tab(s) orally once a day (14 Aug 2019 11:26)  Nifedipine 30 XL once daily  Torsemide 100 mg once daily  insulin aspart: 4  subcutaneous 3 times a day (before meals) (14 Aug 2019 11:26)  Lantus: 18 unit(s) subcutaneous once a day (at bedtime) (14 Aug 2019 11:26)  Lopressor 50 mg oral tablet: 1 tab(s) orally 2 times a day (14 Aug 2019 11:26)  Prograf 1 mg oral capsule: 1 cap(s) orally every 12 hours (14 Aug 2019 11:26)  Synthroid 112 mcg (0.112 mg) oral tablet: 1 tab(s) orally once a day (14 Aug 2019 11:26)    MEDICATIONS  (STANDING):    MEDICATIONS  (PRN):

## 2019-09-02 NOTE — ED PROVIDER NOTE - PROGRESS NOTE DETAILS
Discussed case with cardio fellow.  will see pt in the ED. I had extensive discussion of risks and benefits of pursuing further medical evaluation and/or care with patient and any available family/friends; patient still electing to leave against medical advice. Patient is awake, alert, oriented and demonstrates full capacity and insight into illness. Patient aware and encouraged to return immediately to ED or nearest ED if patient decides to change mind regarding care or if patient experiences any new, worsening, or concerning symptoms. Discussed case with Dr. Mcgowan, cardio fellow.  offered admission and cath tomorrow.  pt refuses.  requests 2nd trop before ama. Discussed results with pt.  All questions were answered and return precautions discussed.  Pt is asx and comfortable at this time.  Unremarkable re-exam.  No further concerns at this time from pt.  Will follow up with PMD and cardio. Strict return precautions given.  Pt understands and agrees with tx plan.

## 2019-09-02 NOTE — ED PROVIDER NOTE - CARE PROVIDER_API CALL
Ronn Portillo)  Cardiovascular Disease; Internal Medicine; Interventional Cardiology  [address] [place] [address], [address]  [place] [address]  Phone: [phone]  Fax: [phone]  Follow Up Time: 1-3 Days

## 2019-09-02 NOTE — ED PROVIDER NOTE - CLINICAL SUMMARY MEDICAL DECISION MAKING FREE TEXT BOX
CP, h/o cad/stents with abnormal nuc stress test May 2019 - no evidence of acute ischemia on testing, Cardio consulted and rec admission for cath however pt requested to leave AMA - risks of same explained to pt incl poss disability/death, pt verbalized understanding of same, all results d/w pt & copies given, encouraged to return to ED @ any time for further mgmt, encouraged close outpt f/u with Dr. Portillo

## 2019-09-02 NOTE — ED PROVIDER NOTE - REFUSAL OF SERVICE, MDM
I had extensive discussion of risks and benefits of pursuing further medical evaluation and/or care with patient and any available family/friends; patient still electing to leave against medical advice. Patient is awake, alert, oriented and demonstrates full capacity and insight into illness. Patient aware and encouraged to return immediately to ED or nearest ED if patient decides to change mind regarding care or if patient experiences any new, worsening, or concerning symptoms.

## 2019-09-02 NOTE — ED PROVIDER NOTE - NS ED ROS FT
Constitutional: See HPI.  Eyes: No visual changes, eye pain or discharge. No Photophobia  ENMT: No hearing changes, pain, discharge or infections.   Cardiac: + chest pain. No SOB or edema. No chest pain with exertion.  Respiratory: No cough or respiratory distress. No hemoptysis.  GI: No nausea, vomiting, diarrhea or abdominal pain.  : No dysuria, frequency or burning. No Discharge  MS: No myalgia, muscle weakness, joint pain or back pain.  Neuro: No headache or weakness  Skin: No skin rash.  Except as documented in the HPI, all other systems are negative.

## 2019-09-02 NOTE — ED PROVIDER NOTE - OBJECTIVE STATEMENT
79 y.o female w/ hx of DM, hypothyroid, CAD x 5 stents, HTN, HLD, liver transplant, PPM presents to the ED for evaluation of chest pain x 2 days.  For the past 2 days left sided chest pain, intermittent, nonexertional, nonpleuritic, mild severity, no radiation of the pain. No further complaints at this time.  Denies dyspnea, HESS, edema of lower extremities, calf pain, fever, chills, URI sxs.  baseline orthopnea.  Had + NM stress test 5/2019.  F/u with Dr. Portillo 8/16/19 and was told she needed cardiac cath.

## 2019-09-02 NOTE — ED ADULT NURSE NOTE - OBJECTIVE STATEMENT
pt presents to er c/o of intermittent left sided chest pain with sob for 2 days, denies any dizziness/headache, Pt alert and orientedx3, iv inserted, pending lab work results, pt on cardiac monitor. Safety maintained and hourly rounding performed. Will continue with plan of care.

## 2019-09-02 NOTE — ED PROVIDER NOTE - PATIENT PORTAL LINK FT
You can access the FollowMyHealth Patient Portal offered by [facility] by registering at the following website: [email or web address]. By joining [facility]’s FollowMyHealth portal, you will also be able to view your health information using other applications (apps) compatible with our system.

## 2019-09-02 NOTE — CONSULT NOTE ADULT - ASSESSMENT
Patient is a 79y old  Female who presents with a chief complaint of chest pain (02 Sep 2019 15:12)    Dual-chamber PPM  Myocardial infarction s/p PCI  HTN (hypertension)  Hypothyroid  Liver transplant status  Diabetes  HESS  Abnormal NST Patient is a 79y old  Female who presents with a chief complaint of chest pain (02 Sep 2019 15:12)    Dual-chamber PPM  Myocardial infarction s/p PCI  HTN (hypertension)  Hypothyroid  Liver transplant status  Diabetes  HESS  Abnormal NST    Had a long discussion with the patient about the importance of getting a cardiac cath. to evaluate for her HESS. She is refusing to stay in the hospital for the night.   Repeat one more set of CE. If negative can d/c home on home meds.   o/p cardiology follow-up

## 2019-09-02 NOTE — ED ADULT NURSE NOTE - PMH
Diabetes    HTN (hypertension)    Hypothyroid    Liver transplant status    Myocardial infarction    SOB (shortness of breath)

## 2019-09-02 NOTE — ED PROVIDER NOTE - ATTENDING CONTRIBUTION TO CARE
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 Described as L sided pressure, intermitt, non-exertional/non-pleuritic, no radiation, no accomp sx, no aggrav/allev factors. Cardio Dr. Portillo, has abnormal nuc stress test in May 2019, saw Dr. Portillo 8/16 was advised she needs cath. PMD Atul. PE: elderly f wdwn nad, ncat, neck supple no jvd, rrr nl s1s2 no mrg, ctab no wrr, abd soft ntnd no palpable masses no rgr, no cvat, ext no cce dpi.

## 2019-09-02 NOTE — ED PROVIDER NOTE - PHYSICAL EXAMINATION
CONST: Well appearing in NAD  EYES: Sclera and conjunctiva clear.  ECARD: Normal S1 S2; Normal rate and rhythm  RESP: Equal BS B/L, No wheezes, rhonchi or rales. No distress  GI: Soft, non-tender, non-distended.  MS: Normal ROM in all extremities. No edema of lower extremities, no calf pain, radial pulses 2+ bilaterally  SKIN: Warm, dry, no acute rashes. Good turgor  NEURO: A&Ox3, No focal deficits. Strength 5/5 with no sensory deficits. Steady gait

## 2019-09-10 ENCOUNTER — APPOINTMENT (OUTPATIENT)
Dept: CARDIOLOGY | Facility: CLINIC | Age: 79
End: 2019-09-10
Payer: MEDICARE

## 2019-09-10 PROCEDURE — 99213 OFFICE O/P EST LOW 20 MIN: CPT

## 2019-09-10 PROCEDURE — 93000 ELECTROCARDIOGRAM COMPLETE: CPT

## 2019-09-24 ENCOUNTER — OUTPATIENT (OUTPATIENT)
Dept: OUTPATIENT SERVICES | Facility: HOSPITAL | Age: 79
LOS: 1 days | Discharge: HOME | End: 2019-09-24
Payer: MEDICARE

## 2019-09-24 VITALS
WEIGHT: 141.76 LBS | OXYGEN SATURATION: 96 % | DIASTOLIC BLOOD PRESSURE: 93 MMHG | RESPIRATION RATE: 18 BRPM | HEIGHT: 63 IN | TEMPERATURE: 96 F | SYSTOLIC BLOOD PRESSURE: 151 MMHG | HEART RATE: 84 BPM

## 2019-09-24 DIAGNOSIS — Z01.818 ENCOUNTER FOR OTHER PREPROCEDURAL EXAMINATION: ICD-10-CM

## 2019-09-24 DIAGNOSIS — I48.0 PAROXYSMAL ATRIAL FIBRILLATION: ICD-10-CM

## 2019-09-24 DIAGNOSIS — Z95.0 PRESENCE OF CARDIAC PACEMAKER: Chronic | ICD-10-CM

## 2019-09-24 DIAGNOSIS — Z95.5 PRESENCE OF CORONARY ANGIOPLASTY IMPLANT AND GRAFT: Chronic | ICD-10-CM

## 2019-09-24 DIAGNOSIS — Z94.4 LIVER TRANSPLANT STATUS: Chronic | ICD-10-CM

## 2019-09-24 LAB
ALBUMIN SERPL ELPH-MCNC: 4.6 G/DL — SIGNIFICANT CHANGE UP (ref 3.5–5.2)
ALP SERPL-CCNC: 94 U/L — SIGNIFICANT CHANGE UP (ref 30–115)
ALT FLD-CCNC: 11 U/L — SIGNIFICANT CHANGE UP (ref 0–41)
ANION GAP SERPL CALC-SCNC: 11 MMOL/L — SIGNIFICANT CHANGE UP (ref 7–14)
APTT BLD: 31.3 SEC — SIGNIFICANT CHANGE UP (ref 27–39.2)
AST SERPL-CCNC: 15 U/L — SIGNIFICANT CHANGE UP (ref 0–41)
BASOPHILS # BLD AUTO: 0.04 K/UL — SIGNIFICANT CHANGE UP (ref 0–0.2)
BASOPHILS NFR BLD AUTO: 0.5 % — SIGNIFICANT CHANGE UP (ref 0–1)
BILIRUB SERPL-MCNC: 0.3 MG/DL — SIGNIFICANT CHANGE UP (ref 0.2–1.2)
BUN SERPL-MCNC: 15 MG/DL — SIGNIFICANT CHANGE UP (ref 10–20)
CALCIUM SERPL-MCNC: 10.4 MG/DL — HIGH (ref 8.5–10.1)
CHLORIDE SERPL-SCNC: 98 MMOL/L — SIGNIFICANT CHANGE UP (ref 98–110)
CO2 SERPL-SCNC: 29 MMOL/L — SIGNIFICANT CHANGE UP (ref 17–32)
CREAT SERPL-MCNC: 0.9 MG/DL — SIGNIFICANT CHANGE UP (ref 0.7–1.5)
EOSINOPHIL # BLD AUTO: 0.32 K/UL — SIGNIFICANT CHANGE UP (ref 0–0.7)
EOSINOPHIL NFR BLD AUTO: 4.3 % — SIGNIFICANT CHANGE UP (ref 0–8)
ESTIMATED AVERAGE GLUCOSE: 203 MG/DL — HIGH (ref 68–114)
GLUCOSE SERPL-MCNC: 284 MG/DL — HIGH (ref 70–99)
HBA1C BLD-MCNC: 8.7 % — HIGH (ref 4–5.6)
HCT VFR BLD CALC: 41.4 % — SIGNIFICANT CHANGE UP (ref 37–47)
HGB BLD-MCNC: 13.4 G/DL — SIGNIFICANT CHANGE UP (ref 12–16)
IMM GRANULOCYTES NFR BLD AUTO: 0.3 % — SIGNIFICANT CHANGE UP (ref 0.1–0.3)
INR BLD: 1.08 RATIO — SIGNIFICANT CHANGE UP (ref 0.65–1.3)
LYMPHOCYTES # BLD AUTO: 1.66 K/UL — SIGNIFICANT CHANGE UP (ref 1.2–3.4)
LYMPHOCYTES # BLD AUTO: 22.1 % — SIGNIFICANT CHANGE UP (ref 20.5–51.1)
MCHC RBC-ENTMCNC: 28.5 PG — SIGNIFICANT CHANGE UP (ref 27–31)
MCHC RBC-ENTMCNC: 32.4 G/DL — SIGNIFICANT CHANGE UP (ref 32–37)
MCV RBC AUTO: 87.9 FL — SIGNIFICANT CHANGE UP (ref 81–99)
MONOCYTES # BLD AUTO: 0.73 K/UL — HIGH (ref 0.1–0.6)
MONOCYTES NFR BLD AUTO: 9.7 % — HIGH (ref 1.7–9.3)
NEUTROPHILS # BLD AUTO: 4.75 K/UL — SIGNIFICANT CHANGE UP (ref 1.4–6.5)
NEUTROPHILS NFR BLD AUTO: 63.1 % — SIGNIFICANT CHANGE UP (ref 42.2–75.2)
NRBC # BLD: 0 /100 WBCS — SIGNIFICANT CHANGE UP (ref 0–0)
PLATELET # BLD AUTO: 172 K/UL — SIGNIFICANT CHANGE UP (ref 130–400)
POTASSIUM SERPL-MCNC: 4.9 MMOL/L — SIGNIFICANT CHANGE UP (ref 3.5–5)
POTASSIUM SERPL-SCNC: 4.9 MMOL/L — SIGNIFICANT CHANGE UP (ref 3.5–5)
PROT SERPL-MCNC: 7.6 G/DL — SIGNIFICANT CHANGE UP (ref 6–8)
PROTHROM AB SERPL-ACNC: 12.4 SEC — SIGNIFICANT CHANGE UP (ref 9.95–12.87)
RBC # BLD: 4.71 M/UL — SIGNIFICANT CHANGE UP (ref 4.2–5.4)
RBC # FLD: 13.2 % — SIGNIFICANT CHANGE UP (ref 11.5–14.5)
SODIUM SERPL-SCNC: 138 MMOL/L — SIGNIFICANT CHANGE UP (ref 135–146)
WBC # BLD: 7.52 K/UL — SIGNIFICANT CHANGE UP (ref 4.8–10.8)
WBC # FLD AUTO: 7.52 K/UL — SIGNIFICANT CHANGE UP (ref 4.8–10.8)

## 2019-09-24 PROCEDURE — 93010 ELECTROCARDIOGRAM REPORT: CPT

## 2019-09-24 RX ORDER — INSULIN ASPART 100 [IU]/ML
4 INJECTION, SOLUTION SUBCUTANEOUS
Qty: 0 | Refills: 0 | DISCHARGE

## 2019-09-24 NOTE — H&P PST ADULT - HISTORY OF PRESENT ILLNESS
CURRENTLY  DENIES ANY CP, PALPITATIONS,COUGH OR DYSURIA  EXERCISE TOLERANCE 1 FOS WITHOUT SOB  HAS DYSPNEA ON EXERTION- CHRONIC    AS PER PATIENT  this is his/her complete medical history including medications - PRESCRIPTIONS  OVER THE COUNTER MEDS

## 2019-09-24 NOTE — H&P PST ADULT - NSICDXPASTSURGICALHX_GEN_ALL_CORE_FT
PAST SURGICAL HISTORY:  H/O heart artery stent X5    Liver transplanted     Pacemaker medtronic- last interogated 1 m ago

## 2019-09-24 NOTE — H&P PST ADULT - NSICDXPASTMEDICALHX_GEN_ALL_CORE_FT
PAST MEDICAL HISTORY:  Diabetes     HTN (hypertension)     Hypothyroid     Liver transplant status     Myocardial infarction     SOB (shortness of breath)

## 2019-09-24 NOTE — H&P PST ADULT - DOES PATIENT HAVE ADVANCE DIRECTIVE
PT STATES SHE WOULD LIKE TO BE DNR/DNI. PT ASLO STATES SHE WANTS TO BE ORGAN DONOR- PT ENCOURAGED TO COMPLETE HEALTH CARE PROXY PROVIDED TO HER TODAY AND TO DISCUSS WISHES WITH FAMILY AND DOCTORS/No

## 2019-10-01 ENCOUNTER — INPATIENT (INPATIENT)
Facility: HOSPITAL | Age: 79
LOS: 16 days | Discharge: NURSING, REHAB OR LONG-TERM CARE | End: 2019-10-18
Attending: THORACIC SURGERY (CARDIOTHORACIC VASCULAR SURGERY) | Admitting: THORACIC SURGERY (CARDIOTHORACIC VASCULAR SURGERY)
Payer: MEDICARE

## 2019-10-01 VITALS
DIASTOLIC BLOOD PRESSURE: 76 MMHG | HEIGHT: 63 IN | RESPIRATION RATE: 18 BRPM | HEART RATE: 88 BPM | SYSTOLIC BLOOD PRESSURE: 176 MMHG | TEMPERATURE: 98 F | WEIGHT: 149.03 LBS

## 2019-10-01 DIAGNOSIS — Z94.4 LIVER TRANSPLANT STATUS: Chronic | ICD-10-CM

## 2019-10-01 DIAGNOSIS — Z95.0 PRESENCE OF CARDIAC PACEMAKER: Chronic | ICD-10-CM

## 2019-10-01 DIAGNOSIS — Z95.5 PRESENCE OF CORONARY ANGIOPLASTY IMPLANT AND GRAFT: Chronic | ICD-10-CM

## 2019-10-01 LAB
ALBUMIN SERPL ELPH-MCNC: 3.8 G/DL — SIGNIFICANT CHANGE UP (ref 3.5–5.2)
ALP SERPL-CCNC: 85 U/L — SIGNIFICANT CHANGE UP (ref 30–115)
ALT FLD-CCNC: 12 U/L — SIGNIFICANT CHANGE UP (ref 0–41)
ANION GAP SERPL CALC-SCNC: 11 MMOL/L — SIGNIFICANT CHANGE UP (ref 7–14)
APTT BLD: 29.4 SEC — SIGNIFICANT CHANGE UP (ref 27–39.2)
AST SERPL-CCNC: 19 U/L — SIGNIFICANT CHANGE UP (ref 0–41)
BILIRUB SERPL-MCNC: 0.3 MG/DL — SIGNIFICANT CHANGE UP (ref 0.2–1.2)
BLD GP AB SCN SERPL QL: SIGNIFICANT CHANGE UP
BUN SERPL-MCNC: 16 MG/DL — SIGNIFICANT CHANGE UP (ref 10–20)
CALCIUM SERPL-MCNC: 9.5 MG/DL — SIGNIFICANT CHANGE UP (ref 8.5–10.1)
CHLORIDE SERPL-SCNC: 101 MMOL/L — SIGNIFICANT CHANGE UP (ref 98–110)
CHOLEST SERPL-MCNC: 104 MG/DL — SIGNIFICANT CHANGE UP (ref 100–200)
CO2 SERPL-SCNC: 24 MMOL/L — SIGNIFICANT CHANGE UP (ref 17–32)
CREAT SERPL-MCNC: 0.9 MG/DL — SIGNIFICANT CHANGE UP (ref 0.7–1.5)
ESTIMATED AVERAGE GLUCOSE: 206 MG/DL — HIGH (ref 68–114)
GLUCOSE BLDC GLUCOMTR-MCNC: 233 MG/DL — HIGH (ref 70–99)
GLUCOSE BLDC GLUCOMTR-MCNC: 234 MG/DL — HIGH (ref 70–99)
GLUCOSE BLDC GLUCOMTR-MCNC: 238 MG/DL — HIGH (ref 70–99)
GLUCOSE BLDC GLUCOMTR-MCNC: 238 MG/DL — HIGH (ref 70–99)
GLUCOSE SERPL-MCNC: 268 MG/DL — HIGH (ref 70–99)
HBA1C BLD-MCNC: 8.8 % — HIGH (ref 4–5.6)
HCT VFR BLD CALC: 36.9 % — LOW (ref 37–47)
HCT VFR BLD CALC: 39.1 % — SIGNIFICANT CHANGE UP (ref 37–47)
HDLC SERPL-MCNC: 46 MG/DL — LOW
HGB BLD-MCNC: 12.2 G/DL — SIGNIFICANT CHANGE UP (ref 12–16)
HGB BLD-MCNC: 12.9 G/DL — SIGNIFICANT CHANGE UP (ref 12–16)
INR BLD: 1.25 RATIO — SIGNIFICANT CHANGE UP (ref 0.65–1.3)
LIPID PNL WITH DIRECT LDL SERPL: 59 MG/DL — SIGNIFICANT CHANGE UP (ref 4–129)
MCHC RBC-ENTMCNC: 28.8 PG — SIGNIFICANT CHANGE UP (ref 27–31)
MCHC RBC-ENTMCNC: 29.2 PG — SIGNIFICANT CHANGE UP (ref 27–31)
MCHC RBC-ENTMCNC: 33 G/DL — SIGNIFICANT CHANGE UP (ref 32–37)
MCHC RBC-ENTMCNC: 33.1 G/DL — SIGNIFICANT CHANGE UP (ref 32–37)
MCV RBC AUTO: 87.3 FL — SIGNIFICANT CHANGE UP (ref 81–99)
MCV RBC AUTO: 88.3 FL — SIGNIFICANT CHANGE UP (ref 81–99)
NRBC # BLD: 0 /100 WBCS — SIGNIFICANT CHANGE UP (ref 0–0)
NRBC # BLD: 0 /100 WBCS — SIGNIFICANT CHANGE UP (ref 0–0)
NT-PROBNP SERPL-SCNC: 1017 PG/ML — HIGH (ref 0–300)
PLATELET # BLD AUTO: 169 K/UL — SIGNIFICANT CHANGE UP (ref 130–400)
PLATELET # BLD AUTO: 169 K/UL — SIGNIFICANT CHANGE UP (ref 130–400)
POTASSIUM SERPL-MCNC: 4.1 MMOL/L — SIGNIFICANT CHANGE UP (ref 3.5–5)
POTASSIUM SERPL-SCNC: 4.1 MMOL/L — SIGNIFICANT CHANGE UP (ref 3.5–5)
PREALB SERPL-MCNC: 14 MG/DL — LOW (ref 20–40)
PROT SERPL-MCNC: 6.5 G/DL — SIGNIFICANT CHANGE UP (ref 6–8)
PROTHROM AB SERPL-ACNC: 14.3 SEC — HIGH (ref 9.95–12.87)
RBC # BLD: 4.18 M/UL — LOW (ref 4.2–5.4)
RBC # BLD: 4.48 M/UL — SIGNIFICANT CHANGE UP (ref 4.2–5.4)
RBC # FLD: 13.1 % — SIGNIFICANT CHANGE UP (ref 11.5–14.5)
RBC # FLD: 13.2 % — SIGNIFICANT CHANGE UP (ref 11.5–14.5)
SODIUM SERPL-SCNC: 136 MMOL/L — SIGNIFICANT CHANGE UP (ref 135–146)
T3 SERPL-MCNC: 95 NG/DL — SIGNIFICANT CHANGE UP (ref 80–200)
T4 AB SER-ACNC: 8.2 UG/DL — SIGNIFICANT CHANGE UP (ref 4.6–12)
TOTAL CHOLESTEROL/HDL RATIO MEASUREMENT: 2.3 RATIO — LOW (ref 4–5.5)
TRIGL SERPL-MCNC: 56 MG/DL — SIGNIFICANT CHANGE UP (ref 10–149)
TSH SERPL-MCNC: 1.15 UIU/ML — SIGNIFICANT CHANGE UP (ref 0.27–4.2)
WBC # BLD: 7.17 K/UL — SIGNIFICANT CHANGE UP (ref 4.8–10.8)
WBC # BLD: 7.99 K/UL — SIGNIFICANT CHANGE UP (ref 4.8–10.8)
WBC # FLD AUTO: 7.17 K/UL — SIGNIFICANT CHANGE UP (ref 4.8–10.8)
WBC # FLD AUTO: 7.99 K/UL — SIGNIFICANT CHANGE UP (ref 4.8–10.8)

## 2019-10-01 PROCEDURE — 93458 L HRT ARTERY/VENTRICLE ANGIO: CPT | Mod: 26

## 2019-10-01 PROCEDURE — 93880 EXTRACRANIAL BILAT STUDY: CPT | Mod: 26

## 2019-10-01 PROCEDURE — 99223 1ST HOSP IP/OBS HIGH 75: CPT | Mod: 57

## 2019-10-01 PROCEDURE — 99223 1ST HOSP IP/OBS HIGH 75: CPT

## 2019-10-01 PROCEDURE — 71045 X-RAY EXAM CHEST 1 VIEW: CPT | Mod: 26

## 2019-10-01 PROCEDURE — 93306 TTE W/DOPPLER COMPLETE: CPT | Mod: 26

## 2019-10-01 RX ORDER — HEPARIN SODIUM 5000 [USP'U]/ML
5000 INJECTION INTRAVENOUS; SUBCUTANEOUS EVERY 8 HOURS
Refills: 0 | Status: DISCONTINUED | OUTPATIENT
Start: 2019-10-01 | End: 2019-10-02

## 2019-10-01 RX ORDER — MUPIROCIN 20 MG/G
1 OINTMENT TOPICAL EVERY 12 HOURS
Refills: 0 | Status: DISCONTINUED | OUTPATIENT
Start: 2019-10-01 | End: 2019-10-02

## 2019-10-01 RX ORDER — LEVOTHYROXINE SODIUM 125 MCG
112 TABLET ORAL DAILY
Refills: 0 | Status: DISCONTINUED | OUTPATIENT
Start: 2019-10-01 | End: 2019-10-02

## 2019-10-01 RX ORDER — INSULIN LISPRO 100/ML
5 VIAL (ML) SUBCUTANEOUS
Refills: 0 | Status: DISCONTINUED | OUTPATIENT
Start: 2019-10-01 | End: 2019-10-02

## 2019-10-01 RX ORDER — INSULIN GLARGINE 100 [IU]/ML
15 INJECTION, SOLUTION SUBCUTANEOUS EVERY MORNING
Refills: 0 | Status: DISCONTINUED | OUTPATIENT
Start: 2019-10-02 | End: 2019-10-02

## 2019-10-01 RX ORDER — INSULIN LISPRO 100/ML
VIAL (ML) SUBCUTANEOUS
Refills: 0 | Status: DISCONTINUED | OUTPATIENT
Start: 2019-10-01 | End: 2019-10-02

## 2019-10-01 RX ORDER — ATORVASTATIN CALCIUM 80 MG/1
40 TABLET, FILM COATED ORAL AT BEDTIME
Refills: 0 | Status: DISCONTINUED | OUTPATIENT
Start: 2019-10-01 | End: 2019-10-02

## 2019-10-01 RX ORDER — DEXTROSE 50 % IN WATER 50 %
15 SYRINGE (ML) INTRAVENOUS ONCE
Refills: 0 | Status: DISCONTINUED | OUTPATIENT
Start: 2019-10-01 | End: 2019-10-02

## 2019-10-01 RX ORDER — METOPROLOL TARTRATE 50 MG
25 TABLET ORAL THREE TIMES A DAY
Refills: 0 | Status: DISCONTINUED | OUTPATIENT
Start: 2019-10-01 | End: 2019-10-02

## 2019-10-01 RX ORDER — SODIUM CHLORIDE 9 MG/ML
1000 INJECTION INTRAMUSCULAR; INTRAVENOUS; SUBCUTANEOUS
Refills: 0 | Status: DISCONTINUED | OUTPATIENT
Start: 2019-10-01 | End: 2019-10-03

## 2019-10-01 RX ORDER — CHLORHEXIDINE GLUCONATE 213 G/1000ML
1 SOLUTION TOPICAL ONCE
Refills: 0 | Status: COMPLETED | OUTPATIENT
Start: 2019-10-02 | End: 2019-10-02

## 2019-10-01 RX ORDER — PANTOPRAZOLE SODIUM 20 MG/1
40 TABLET, DELAYED RELEASE ORAL
Refills: 0 | Status: DISCONTINUED | OUTPATIENT
Start: 2019-10-01 | End: 2019-10-02

## 2019-10-01 RX ORDER — SODIUM CHLORIDE 9 MG/ML
3 INJECTION INTRAMUSCULAR; INTRAVENOUS; SUBCUTANEOUS EVERY 8 HOURS
Refills: 0 | Status: DISCONTINUED | OUTPATIENT
Start: 2019-10-01 | End: 2019-10-02

## 2019-10-01 RX ORDER — ALBUMIN HUMAN 25 %
3000 VIAL (ML) INTRAVENOUS ONCE
Refills: 0 | Status: DISCONTINUED | OUTPATIENT
Start: 2019-10-02 | End: 2019-10-02

## 2019-10-01 RX ORDER — CHLORHEXIDINE GLUCONATE 213 G/1000ML
15 SOLUTION TOPICAL ONCE
Refills: 0 | Status: DISCONTINUED | OUTPATIENT
Start: 2019-10-01 | End: 2019-10-02

## 2019-10-01 RX ORDER — DOCUSATE SODIUM 100 MG
100 CAPSULE ORAL THREE TIMES A DAY
Refills: 0 | Status: DISCONTINUED | OUTPATIENT
Start: 2019-10-01 | End: 2019-10-02

## 2019-10-01 RX ORDER — ALPRAZOLAM 0.25 MG
0.25 TABLET ORAL EVERY 8 HOURS
Refills: 0 | Status: DISCONTINUED | OUTPATIENT
Start: 2019-10-01 | End: 2019-10-02

## 2019-10-01 RX ORDER — DEXTROSE 50 % IN WATER 50 %
12.5 SYRINGE (ML) INTRAVENOUS ONCE
Refills: 0 | Status: DISCONTINUED | OUTPATIENT
Start: 2019-10-01 | End: 2019-10-02

## 2019-10-01 RX ORDER — SODIUM CHLORIDE 9 MG/ML
1000 INJECTION, SOLUTION INTRAVENOUS
Refills: 0 | Status: DISCONTINUED | OUTPATIENT
Start: 2019-10-01 | End: 2019-10-02

## 2019-10-01 RX ORDER — ASPIRIN/CALCIUM CARB/MAGNESIUM 324 MG
81 TABLET ORAL DAILY
Refills: 0 | Status: DISCONTINUED | OUTPATIENT
Start: 2019-10-01 | End: 2019-10-02

## 2019-10-01 RX ORDER — TACROLIMUS 5 MG/1
1 CAPSULE ORAL EVERY 12 HOURS
Refills: 0 | Status: DISCONTINUED | OUTPATIENT
Start: 2019-10-01 | End: 2019-10-02

## 2019-10-01 RX ORDER — GLUCAGON INJECTION, SOLUTION 0.5 MG/.1ML
1 INJECTION, SOLUTION SUBCUTANEOUS ONCE
Refills: 0 | Status: DISCONTINUED | OUTPATIENT
Start: 2019-10-01 | End: 2019-10-02

## 2019-10-01 RX ORDER — ZOLPIDEM TARTRATE 10 MG/1
5 TABLET ORAL AT BEDTIME
Refills: 0 | Status: DISCONTINUED | OUTPATIENT
Start: 2019-10-01 | End: 2019-10-02

## 2019-10-01 RX ORDER — CHLORHEXIDINE GLUCONATE 213 G/1000ML
1 SOLUTION TOPICAL ONCE
Refills: 0 | Status: COMPLETED | OUTPATIENT
Start: 2019-10-01 | End: 2019-10-01

## 2019-10-01 RX ORDER — ACETAMINOPHEN 500 MG
650 TABLET ORAL ONCE
Refills: 0 | Status: COMPLETED | OUTPATIENT
Start: 2019-10-01 | End: 2019-10-01

## 2019-10-01 RX ORDER — HYDRALAZINE HCL 50 MG
10 TABLET ORAL ONCE
Refills: 0 | Status: COMPLETED | OUTPATIENT
Start: 2019-10-01 | End: 2019-10-02

## 2019-10-01 RX ADMIN — Medication 25 MILLIGRAM(S): at 22:30

## 2019-10-01 RX ADMIN — Medication 650 MILLIGRAM(S): at 10:05

## 2019-10-01 RX ADMIN — SODIUM CHLORIDE 50 MILLILITER(S): 9 INJECTION INTRAMUSCULAR; INTRAVENOUS; SUBCUTANEOUS at 12:14

## 2019-10-01 RX ADMIN — Medication 100 MILLIGRAM(S): at 16:11

## 2019-10-01 RX ADMIN — HEPARIN SODIUM 5000 UNIT(S): 5000 INJECTION INTRAVENOUS; SUBCUTANEOUS at 22:31

## 2019-10-01 RX ADMIN — SODIUM CHLORIDE 3 MILLILITER(S): 9 INJECTION INTRAMUSCULAR; INTRAVENOUS; SUBCUTANEOUS at 22:31

## 2019-10-01 RX ADMIN — CHLORHEXIDINE GLUCONATE 1 APPLICATION(S): 213 SOLUTION TOPICAL at 20:32

## 2019-10-01 RX ADMIN — Medication 4: at 14:17

## 2019-10-01 RX ADMIN — SODIUM CHLORIDE 3 MILLILITER(S): 9 INJECTION INTRAMUSCULAR; INTRAVENOUS; SUBCUTANEOUS at 16:14

## 2019-10-01 RX ADMIN — Medication 100 MILLIGRAM(S): at 22:30

## 2019-10-01 RX ADMIN — MUPIROCIN 1 APPLICATION(S): 20 OINTMENT TOPICAL at 17:30

## 2019-10-01 RX ADMIN — HEPARIN SODIUM 5000 UNIT(S): 5000 INJECTION INTRAVENOUS; SUBCUTANEOUS at 16:12

## 2019-10-01 RX ADMIN — ZOLPIDEM TARTRATE 5 MILLIGRAM(S): 10 TABLET ORAL at 23:35

## 2019-10-01 RX ADMIN — CHLORHEXIDINE GLUCONATE 1 APPLICATION(S): 213 SOLUTION TOPICAL at 22:24

## 2019-10-01 RX ADMIN — Medication 0.25 MILLIGRAM(S): at 13:53

## 2019-10-01 RX ADMIN — ATORVASTATIN CALCIUM 40 MILLIGRAM(S): 80 TABLET, FILM COATED ORAL at 22:30

## 2019-10-01 RX ADMIN — Medication 650 MILLIGRAM(S): at 10:42

## 2019-10-01 RX ADMIN — Medication 25 MILLIGRAM(S): at 16:14

## 2019-10-01 RX ADMIN — TACROLIMUS 1 MILLIGRAM(S): 5 CAPSULE ORAL at 17:30

## 2019-10-01 NOTE — CONSULT NOTE ADULT - ASSESSMENT
80 yo female with CAD s/p PCI x 5 (done in [place])  over may years (LAD/LCX/OM), last PCI> 5 years ago, PPM ([facility]) complicated by pericardial tamponade s/p pericardiocentesis ([place]) presenting for dyspnea on exertion. pt with PMH of hep B complicated by HCC s/p liver transplant > 10 year ago. pt was found to have 3 vessels disease on cardiac cath. GI service has been called for preoperative risk stratification for CABG.    # Reported Hep B complicated by HCC s/p liver transplant >10 year ago:  - please obtain outpatient medical records  - liver function is normal , no clinical or biochemical evidence of dysfunction   - Albumin: 3.8 , Bilirubin Total: 0.3 , Alkaline Phosphatase: 85 , AST: 19 , ALT: 12 , INR: 1.25 ratio ( 01 Oct 2019 08:43 )   - avoid hepatotoxic drugs, avoid hypotension  - please review medications list for any interaction with Tacrolimus  - from GI stand point, there is no contraindication for the planned procedure     # Discussed with Dr. Barker 78 yo female with CAD s/p PCI x 5 (done in [place])  over may years (LAD/LCX/OM), last PCI> 5 years ago, PPM ([facility]) complicated by pericardial tamponade s/p pericardiocentesis ([place]) presenting for dyspnea on exertion. pt with PMH of hep B complicated by HCC s/p liver transplant > 10 year ago. pt was found to have 3 vessels disease on cardiac cath. GI service has been called for preoperative risk stratification for CABG.    # Reported Hepatitis B infection complicated by HCC s/p liver transplant >10 year ago:  - please obtain outpatient medical records  - liver function is normal , no clinical or biochemical evidence of dysfunction   - Albumin: 3.8 , Bilirubin Total: 0.3 , Alkaline Phosphatase: 85 , AST: 19 , ALT: 12 , INR: 1.25 ratio ( 01 Oct 2019 08:43 )   - avoid hepatotoxic drugs, avoid hypotension  - please review medications list for any interaction with Tacrolimus (avoid inducers and inhibitors of CY)  - from GI stand point, there is no contraindication for the planned procedure     # Discussed with Dr. Barker 80 yo female with CAD s/p PCI x 5 (done in [place])  over may years (LAD/LCX/OM), last PCI> 5 years ago, PPM ([facility]) complicated by pericardial tamponade s/p pericardiocentesis ([place]) presenting for dyspnea on exertion. pt with PMH of hep B complicated by HCC s/p liver transplant > 10 year ago. pt was found to have 3 vessels disease on cardiac cath. GI service has been called for preoperative risk stratification for CABG.    # Reported Hepatitis B infection complicated by HCC s/p liver transplant >10 year ago:  - please obtain outpatient medical records  - liver function is normal , no clinical or biochemical evidence of dysfunction   - Albumin: 3.8 , Bilirubin Total: 0.3 , Alkaline Phosphatase: 85 , AST: 19 , ALT: 12 , INR: 1.25 ratio ( 01 Oct 2019 08:43 )   - avoid hepatotoxic drugs, avoid hypotension  - please be wary of medications with any interactions with Tacrolimus (avoid inducers and inhibitors of CY)  Please check hepatitis B serology to clarify hep B status and further management if needed.   - from GI stand point, there is no contraindication for the planned procedure

## 2019-10-01 NOTE — CONSULT NOTE ADULT - SUBJECTIVE AND OBJECTIVE BOX
Chief Complaint: Patient is a 79y old  Female who presents with a chief complaint of chest pain    GI service has been called for preoperative risk stratification for CABG    HPI:     78 yo female with CAD s/p PCI x 5 (done in [place])  over may years (LAD/LCX/OM), last PCI> 5 years ago, PPM ([facility]) complicated by pericardial tamponade s/p pericardiocentesis ([place]) presenting for dyspnea on exertion. She was seeing Dr. Portillo in the office and he had planned her for a cardiac cath. based on an abnormal NST done in [place] in May 2019.  The patient also has a history of liver transplant done 10 years ago in [place], [place].  Patient presented today for elective cardiac catheterization.  She denies chest pain, palpitations, dizziness. She admits that prior to her previous stents, her symptoms were not chest pain but HESS.  Cardiac catheterization revealed severe 3vCAD with normal LV function on LV gram.      GI History:     pt reports chronic hepatitis B complicated by HCC diagnosed 10 years ago. she was put on the transplant list and got organ donor transplant the same year. pt has been following periodically with hepatologist and reports normal blood work with no signs of dysfunction or rejection. pt reports she takes tacrolimus 1 mg q 12 hrs daily.  reports daily 1-2 formed brown BMs.  she reports she had colonoscopy more than 10 years ago and was unremarkable.  never had EGD.  denies nausea, vomiting, pain, hematemesis, melena, fresh blood, constipation, diarrhea or straining.     Medications:  ALPRAZolam 0.25 milliGRAM(s) Oral every 8 hours PRN  aspirin enteric coated 81 milliGRAM(s) Oral daily  atorvastatin 40 milliGRAM(s) Oral at bedtime  dextrose 40% Gel 15 Gram(s) Oral once PRN  dextrose 5%. 1000 milliLiter(s) IV Continuous <Continuous>  dextrose 50% Injectable 12.5 Gram(s) IV Push once  docusate sodium 100 milliGRAM(s) Oral three times a day  glucagon  Injectable 1 milliGRAM(s) IntraMuscular once PRN  heparin  Injectable 5000 Unit(s) SubCutaneous every 8 hours  insulin lispro (HumaLOG) corrective regimen sliding scale   SubCutaneous three times a day before meals  insulin lispro Injectable (HumaLOG) 5 Unit(s) SubCutaneous three times a day before meals  levothyroxine 112 MICROGram(s) Oral daily  metoprolol tartrate 25 milliGRAM(s) Oral three times a day  mupirocin 2% Ointment 1 Application(s) Both Nostrils every 12 hours  pantoprazole    Tablet 40 milliGRAM(s) Oral before breakfast  sodium chloride 0.9% lock flush 3 milliLiter(s) IV Push every 8 hours  sodium chloride 0.9%. 1000 milliLiter(s) IV Continuous <Continuous>  tacrolimus 1 milliGRAM(s) Oral every 12 hours  zolpidem 5 milliGRAM(s) Oral at bedtime PRN      PMHX/PSHX:    Myocardial infarction  HTN (hypertension)  SOB (shortness of breath)  Hypothyroid  Liver transplant status  Diabetes  H/O heart artery stent  Pacemaker  Liver transplanted  No significant past surgical history      Family history:  Family history of early CAD    Social History: ex smoker stopped more than 10 years ago, denies alcohol or illicit drug use     Allergies:  No Known Drug Allergies  TEGADERM (Rash)    Review of Systems:  General:  No wt loss, fevers, chills, night sweats, fatigue or pruritis.  Eyes:  Good vision, no reported pain or redness.  ENT:  No sore throat, pain, runny nose, or difficulty swallowing  CV:  No pain, palpitations, hypo/hypertension  Resp:  reports dyspnea, but no cough, tachypnea, wheezing  GI:  No pain, nausea, vomiting, dysphagia, heartburn, diarrhea, constipation, or weight loss. , No rectal bleeding, tarry stools, or hematemesis.  :  No pain, bleeding/discharges, incontinence, nocturia  Musculoskeletal:  No pain, weakness or fasciculations.  Neuro:  No weakness, tingling, memory problems or paresthesias  Psych:  No fatigue, insomnia, mood problems, depression  Endocrine:  No polyuria, polydipsia, cold/heat intolerance  Heme:  No petechiae, ecchymosis, easy bruisability  Skin:  No rash, pruritis, tattoos, scars, or edema      PHYSICAL EXAM:   GENERAL:  Appears stated age, well-groomed, well-nourished, no distress  HEENT:  Conjunctivae clear and pink, no thyromegaly, nodules, adenopathy, no JVD, sclera -anicteric  CHEST:  Full & symmetric excursion, no increased effort, breath sounds clear  HEART:  Regular rhythm, S1, S2, no murmur/rub/S3/S4, no abdominal bruit, no edema  ABDOMEN:  Soft, non-tender, non-distended, normoactive bowel sounds,  no masses ,no hepato-splenomegaly, no signs of chronic liver disease, transverse scar in the upper abdomen, well healed   EXTEREMITIES:  no cyanosis,clubbing or edema  SKIN:  No rash/erythema/ecchymoses/petechiae/wounds/abscess/warm/dry  NEURO:  Alert, oriented, no asterixis, no tremor, no encephalopathy    LABS:                        12.2   7.17  )-----------( 169      ( 01 Oct 2019 08:43 )             36.9     10-01    136  |  101  |  16  ----------------------------<  268<H>  4.1   |  24  |  0.9    Ca    9.5      01 Oct 2019 08:43    TPro  6.5  /  Alb  3.8  /  TBili  0.3  /  DBili  x   /  AST  19  /  ALT  12  /  AlkPhos  85  10-01    LIVER FUNCTIONS - ( 01 Oct 2019 08:43 )  Alb: 3.8 g/dL / Pro: 6.5 g/dL / ALK PHOS: 85 U/L / ALT: 12 U/L / AST: 19 U/L / GGT: x           PT/INR - ( 01 Oct 2019 08:43 )   PT: 14.30 sec;   INR: 1.25 ratio         PTT - ( 01 Oct 2019 08:43 )  PTT:29.4 sec        Imaging: N/A Chief Complaint: Patient is a 79y old  Female who presents with a chief complaint of chest pain    GI service has been called for preoperative risk stratification for CABG    HPI:     80 yo female with CAD s/p PCI x 5 (done in [place])  over may years (LAD/LCX/OM), last PCI> 5 years ago, PPM ([facility]) complicated by pericardial tamponade s/p pericardiocentesis ([place]) presenting for dyspnea on exertion. She was seeing Dr. Portillo in the office and he had planned her for a cardiac cath. based on an abnormal NST done in [place] in May 2019.  The patient also has a history of liver transplant done 10 years ago in [place], [place].  Patient presented today for elective cardiac catheterization.  She denies chest pain, palpitations, dizziness. She admits that prior to her previous stents, her symptoms were not chest pain but HESS.  Cardiac catheterization revealed severe 3vCAD with normal LV function on LV gram.      GI History:     pt reports chronic hepatitis B complicated by HCC diagnosed 10 years ago. she was put on the transplant list and got organ donor transplant the same year. pt has been following periodically with hepatologist and reports normal blood work with no signs of dysfunction or rejection. pt reports she takes tacrolimus 1 mg q 12 hrs daily.  reports daily 1-2 formed brown BMs.  she reports she had colonoscopy more than 10 years ago and was unremarkable.  never had EGD.  denies nausea, vomiting, pain, hematemesis, melena, fresh blood, constipation, diarrhea or straining.     Medications:  ALPRAZolam 0.25 milliGRAM(s) Oral every 8 hours PRN  aspirin enteric coated 81 milliGRAM(s) Oral daily  atorvastatin 40 milliGRAM(s) Oral at bedtime  dextrose 40% Gel 15 Gram(s) Oral once PRN  dextrose 5%. 1000 milliLiter(s) IV Continuous <Continuous>  dextrose 50% Injectable 12.5 Gram(s) IV Push once  docusate sodium 100 milliGRAM(s) Oral three times a day  glucagon  Injectable 1 milliGRAM(s) IntraMuscular once PRN  heparin  Injectable 5000 Unit(s) SubCutaneous every 8 hours  insulin lispro (HumaLOG) corrective regimen sliding scale   SubCutaneous three times a day before meals  insulin lispro Injectable (HumaLOG) 5 Unit(s) SubCutaneous three times a day before meals  levothyroxine 112 MICROGram(s) Oral daily  metoprolol tartrate 25 milliGRAM(s) Oral three times a day  mupirocin 2% Ointment 1 Application(s) Both Nostrils every 12 hours  pantoprazole    Tablet 40 milliGRAM(s) Oral before breakfast  sodium chloride 0.9% lock flush 3 milliLiter(s) IV Push every 8 hours  sodium chloride 0.9%. 1000 milliLiter(s) IV Continuous <Continuous>  tacrolimus 1 milliGRAM(s) Oral every 12 hours  zolpidem 5 milliGRAM(s) Oral at bedtime PRN      PMHX/PSHX:    Myocardial infarction  HTN (hypertension)  SOB (shortness of breath)  Hypothyroid  Liver transplantation   Diabetes  H/O heart artery stent  Pacemaker        Family history:  Family history of early CAD    Social History: ex smoker stopped more than 10 years ago, denies alcohol or illicit drug use     Allergies:  No Known Drug Allergies  TEGADERM (Rash)    Review of Systems:  General:  No wt loss, fevers, chills, night sweats, fatigue or pruritis.  Eyes:  Good vision, no reported pain or redness.  ENT:  No sore throat, pain, runny nose, or difficulty swallowing  CV:  No pain, palpitations, hypo/hypertension  Resp:  reports dyspnea, but no cough, tachypnea, wheezing  GI:  No pain, nausea, vomiting, dysphagia, heartburn, diarrhea, constipation, or weight loss. , No rectal bleeding, tarry stools, or hematemesis.  :  No pain, bleeding/discharges, incontinence, nocturia  Musculoskeletal:  No pain, weakness or fasciculations.  Neuro:  No weakness, tingling, memory problems or paresthesias  Psych:  No fatigue, insomnia, mood problems, depression  Endocrine:  No polyuria, polydipsia, cold/heat intolerance  Heme:  No petechiae, ecchymosis, easy bruisability  Skin:  No rash, pruritis, tattoos, scars, or edema      PHYSICAL EXAM:   GENERAL:  Appears stated age, well-groomed, well-nourished, no distress  HEENT:  Conjunctivae clear and pink, no thyromegaly, nodules, adenopathy, no JVD, sclera -anicteric  CHEST:  Full & symmetric excursion, no increased effort, breath sounds clear  HEART:  Regular rhythm, S1, S2, no murmur/rub/S3/S4, no abdominal bruit, no edema  ABDOMEN:  Soft, non-tender, non-distended, normoactive bowel sounds,  no masses ,no hepato-splenomegaly, no signs of chronic liver disease, transverse scar in the upper abdomen, well healed   EXTEREMITIES:  no cyanosis,clubbing or edema  SKIN:  No rash/erythema/ecchymoses/petechiae/wounds/abscess/warm/dry  NEURO:  Alert, oriented, no asterixis, no tremor, no encephalopathy    LABS:                        12.2   7.17  )-----------( 169      ( 01 Oct 2019 08:43 )             36.9     10-01    136  |  101  |  16  ----------------------------<  268<H>  4.1   |  24  |  0.9    Ca    9.5      01 Oct 2019 08:43    TPro  6.5  /  Alb  3.8  /  TBili  0.3  /  DBili  x   /  AST  19  /  ALT  12  /  AlkPhos  85  10-01    LIVER FUNCTIONS - ( 01 Oct 2019 08:43 )  Alb: 3.8 g/dL / Pro: 6.5 g/dL / ALK PHOS: 85 U/L / ALT: 12 U/L / AST: 19 U/L / GGT: x           PT/INR - ( 01 Oct 2019 08:43 )   PT: 14.30 sec;   INR: 1.25 ratio         PTT - ( 01 Oct 2019 08:43 )  PTT:29.4 sec        Imaging: N/A

## 2019-10-01 NOTE — CONSULT NOTE ADULT - ASSESSMENT
CTS ATTENDING    Patient interviewed and examined with daughter present  Case and angiogram reviewed with Dr. Portillo  Patient is 79 yrs old, has liver transplant for 10 yrs, multiple PCI/stents for CAD and now had an abnormal stress test  Cath showed critical multivessel disease not suitable for further PCI, and is referred for cabg    Procedure, risks, benefits and alternatives explained and patient agreed to proceed after withdrawing or suspending her DNR order     Will proceed with surgery today.  STS risk discussed with the patient, specifically with regard to ventilator support, intubation and resuscitation.

## 2019-10-01 NOTE — CONSULT NOTE ADULT - SUBJECTIVE AND OBJECTIVE BOX
Surgeon: /Martin/ Yuki    Consult requesting by: Dr. Portillo    HISTORY OF PRESENT ILLNESS:  80 yo female with CAD s/p PCI x 5 (done in [place])  over may years (LAD/LCX/OM), last PCI> 5 years ago, PPM ([facility]) complicated by pericardial tamponade s/p pericardiocentesis ([place]) presenting for dyspnea on exertion. She was seeing Dr. Portillo in the office and he had planned her for a cardiac cath. based on an abnormal NST done in [place] in May 2019.  The patient also has a history of liver transplant done 10 years ago in [place], [place].  Patient presented today for elective cardiac catheterization.  She denies chest pain, palpitations, dizziness. She admits that prior to her previous stents, her symptoms were not chest pain but HESS.  Cardiac catheterization revealed severe 3vCAD with normal LV function on LV gram.      NYHA functional class    [ ] Class I (no limitation) [ x] Class II (slight limitation) [ ] Class III (marked limitation) [ ] Class IV (symptoms at rest)    PAST MEDICAL & SURGICAL HISTORY:  Myocardial infarction  HTN (hypertension)  SOB (shortness of breath)  Hypothyroid  Liver transplant status  Diabetes  H/O heart artery stent: X5  Pacemaker: medtronic- last interogated 1 m ago  Liver transplanted      MEDICATIONS  (STANDING):  aspirin enteric coated 81 milliGRAM(s) Oral daily  atorvastatin 40 milliGRAM(s) Oral at bedtime  dextrose 5%. 1000 milliLiter(s) (50 mL/Hr) IV Continuous <Continuous>  dextrose 50% Injectable 12.5 Gram(s) IV Push once  docusate sodium 100 milliGRAM(s) Oral three times a day  heparin  Injectable 5000 Unit(s) SubCutaneous every 8 hours  insulin lispro (HumaLOG) corrective regimen sliding scale   SubCutaneous three times a day before meals  insulin lispro Injectable (HumaLOG) 5 Unit(s) SubCutaneous three times a day before meals  metoprolol tartrate 25 milliGRAM(s) Oral three times a day  mupirocin 2% Ointment 1 Application(s) Both Nostrils every 12 hours  pantoprazole    Tablet 40 milliGRAM(s) Oral before breakfast  sodium chloride 0.9% lock flush 3 milliLiter(s) IV Push every 8 hours  sodium chloride 0.9%. 1000 milliLiter(s) (50 mL/Hr) IV Continuous <Continuous>    MEDICATIONS  (PRN):  dextrose 40% Gel 15 Gram(s) Oral once PRN Blood Glucose LESS THAN 70 milliGRAM(s)/deciliter  glucagon  Injectable 1 milliGRAM(s) IntraMuscular once PRN Glucose LESS THAN 70 milligrams/deciliter      Allergies    No Known Drug Allergies  TEGADERM (Rash)      SOCIAL HISTORY:  Smoker: [ ] Yes  [ x] No        PACK YEARS:                         WHEN QUIT?  ETOH use: [ ] Yes  [x ] No              FREQUENCY / QUANTITY:  Ilicit Drug use:  [ ] Yes  [x ] No  Occupation: Retired  Lives with: Daughter  Assisted device use: None (but has "bad knees"  5 meter walk test: 1____sec, 2____sec, 3___sec - UNABLE TO COMPLETE AT THIS TIME DUE TO FEMORAL CATHETERIZATION  FAMILY HISTORY:  Family history of early CAD  FH: cancer      Review of Systems  CONSTITUTIONAL:  Fevers[ ] chills[ ] sweats[ ] fatigue[x ] weight loss[ ] weight gain [ ]            NEGATIVE [X]   NEURO:  parathesias[ ] seizures [ ]  syncope [ ]  confusion [ ]                                                       NEGATIVE[ X]   EYES: glasses[ ]  blurry vision[ ]  discharge[ ] pain[ ] glaucoma [ ]                                                 NEGATIVE[X ]   ENMT:  difficulty hearing [ ]  vertigo[ ]  dysphagia[ ] epistaxis[ ] recent dental work [ ]           NEGATIVE[ X]   CV:  chest pain[ x] palpitations[ ] HESS [ x] diaphoresis [ ]                                                                  NEGATIVE[ ]   RESPIRATORY:  wheezing[ ] SOB[x ] cough [ ] sputum[ ] hemoptysis[ ]                                          NEGATIVE[ ]   GI:  nausea[ ]  vomiting [ ]  diarrhea[ ] constipation [ ] melena [ ]                                             NEGATIVE[ X]   : hematuria[ ]  dysuria[ ] urgency[ ] incontinence[ ]                                                                   NEGATIVE[ X]   MUSCULOSKELETAL:  arthritis[ ]  joint swelling [ ] muscle weakness [ ] Hx vein stripping [ ]   NEGATIVE[X ]   SKIN/BREAST:  rash[ ] itching [ ]  hair loss[ ] masses[ ]                                                                    NEGATIVE[ X]   PSYCH:  dementia [ ] depression [ ] anxiety[ ]                                                                                     NEGATIVE[X ]   HEME/LYMPH:  bruises easily[ ] enlarged lymph nodes[ ] tender lymph nodes[ ]                     NEGATIVE[ X]   ENDOCRINE:  cold intolerance[ ] heat intolerance[ ] polydipsia[ ]                                                NEGATIVE[ X]       CONSTITUTIONAL:  WNL[x ]   Neuro: WNL[x ] Normal exam oriented to person/place & time with no focal motor or sensory  deficits. Other                     Eyes: WNL[ x]   Normal exam of conjunctiva & lids, pupils equally reactive. Other     ENT: WNL[ x]    Normal exam of nasal/oral mucosa with absence of cyanosis. Other  Neck: WNL[x ]  Normal exam of jugular veins, trachea & thyroid. Other  Chest: WNL[x ] Normal lung exam with good air movement absence of wheezes, rales or Ronchi                                                                             CV:  Auscultation: normal [ x] S3[ ] S4[ ] Irregular [ ] Rub[ ] Clicks[ ]    Murmurs none:[x ]systolic [ ]  diastolic [ ] holosystolic [ ]  Carotids: No Bruits[x ] Other                 Abdominal Aorta: normal [ ] nonpalpable[ ]Other                                                                                      GI: WNL[ x] Normal exam of abdomen, liver & spleen with no noted masses or tenderness. Other                                                                                                        Extremities: WNL[x ] Normal no evidence of cyanosis or deformity Edema: none[ ]trace[ ]1+[ ]2+[ ]3+[ ]4+[ ]  Lower Extremity Pulses: Right[x ] Left[ x]Varicosities[ ]  SKIN :WNL[x ] Normal exam to inspection & palpation. Other:                                                          LABS:                        12.2   7.17  )-----------( 169      ( 01 Oct 2019 08:43 )             36.9     10-01    136  |  101  |  16  ----------------------------<  268<H>  4.1   |  24  |  0.9    Ca    9.5      01 Oct 2019 08:43    TPro  6.5  /  Alb  3.8  /  TBili  0.3  /  DBili  x   /  AST  19  /  ALT  12  /  AlkPhos  85  10-01    PT/INR - ( 01 Oct 2019 08:43 )   PT: 14.30 sec;   INR: 1.25 ratio         PTT - ( 01 Oct 2019 08:43 )  PTT:29.4 sec    Cardiac Cath:  3vCAD with NL LV function - Full report pending    TTE / ELIANE:  Pending    Recommendation: (Procedures/Evaluations)  CT HEAD Non-Contrast:[  ]  CT Chest without contrast [ x]  Echocadiography :[ x]  Carotid Duplex :[x ]  CRYS/PVR: [ ]  PFT : Simple PFT [ x]  Full [ ]  Renal Consult [ ]  Pulmonary Consult: [ ]   Vascular Consult [ ]    Dental Consult [ ]   Hem-Onc Consult [ ]   GI Consult [ x] - Liver transplant patient; Clearance for Surgery  EPS: [x] - interrogate PPM   Other Consultations :    STS Score:   Procedure: Isolated CAB CALCULATE   Risk of Mortality: 	3.411% 	  Renal Failure: 	2.495% 	  Permanent Stroke: 	1.761% 	  Prolonged Ventilation: 	6.299% 	  DSW Infection: 	0.165% 	  Reoperation: 	2.294% 	  Morbidity or Mortality: 	11.446% 	  Short Length of Stay: 	25.430% 	  Long Length of Stay: 	5.975%	      Impression:    CAD [x ]  Valvular  disease: No [x ]   Aortic Disease No: [x ]   AUDRA: Yes[ ] No [x ]   CKD: No [x] Stage I [ ] , Stage II [ ] , Stage III [ ], Stage IV [ ]   Anemia: Yes [ ], No [ x]  Diabetes :Yes [ x], No [ ]  Acute MI: Yes [ ], No [x]   Heart Failure: Yes [ ] , No [ x] HFpEF [ ], HFrEF [ ]        Assessment/ Plan:  79F here with severe 3vCAD.  1. Admit to CTS service - 3C Telemetry  2. Pre-op evaluation for possible CABG  3. CT chest non-contrast; TTE; Carotid duplex; Simple PFT's; GI consult; interrogate PPM; Routine Pre-op Labs

## 2019-10-01 NOTE — ASU PATIENT PROFILE, ADULT - PSH
H/O heart artery stent  X5  Liver transplanted    Pacemaker  [employer]- last interogated 1 m ago

## 2019-10-02 ENCOUNTER — TRANSCRIPTION ENCOUNTER (OUTPATIENT)
Age: 79
End: 2019-10-02

## 2019-10-02 LAB
ALBUMIN SERPL ELPH-MCNC: 3.9 G/DL — SIGNIFICANT CHANGE UP (ref 3.5–5.2)
ALBUMIN SERPL ELPH-MCNC: 4 G/DL — SIGNIFICANT CHANGE UP (ref 3.5–5.2)
ALP SERPL-CCNC: 56 U/L — SIGNIFICANT CHANGE UP (ref 30–115)
ALP SERPL-CCNC: 82 U/L — SIGNIFICANT CHANGE UP (ref 30–115)
ALT FLD-CCNC: 17 U/L — SIGNIFICANT CHANGE UP (ref 0–41)
ALT FLD-CCNC: 38 U/L — SIGNIFICANT CHANGE UP (ref 0–41)
ANION GAP SERPL CALC-SCNC: 12 MMOL/L — SIGNIFICANT CHANGE UP (ref 7–14)
ANION GAP SERPL CALC-SCNC: 14 MMOL/L — SIGNIFICANT CHANGE UP (ref 7–14)
APPEARANCE UR: CLEAR — SIGNIFICANT CHANGE UP
APTT BLD: 24.7 SEC — LOW (ref 27–39.2)
APTT BLD: 32.4 SEC — SIGNIFICANT CHANGE UP (ref 27–39.2)
AST SERPL-CCNC: 113 U/L — HIGH (ref 0–41)
AST SERPL-CCNC: 40 U/L — SIGNIFICANT CHANGE UP (ref 0–41)
BASOPHILS # BLD AUTO: 0.02 K/UL — SIGNIFICANT CHANGE UP (ref 0–0.2)
BASOPHILS NFR BLD AUTO: 0.3 % — SIGNIFICANT CHANGE UP (ref 0–1)
BILIRUB SERPL-MCNC: 0.7 MG/DL — SIGNIFICANT CHANGE UP (ref 0.2–1.2)
BILIRUB SERPL-MCNC: 1.2 MG/DL — SIGNIFICANT CHANGE UP (ref 0.2–1.2)
BILIRUB UR-MCNC: NEGATIVE — SIGNIFICANT CHANGE UP
BUN SERPL-MCNC: 11 MG/DL — SIGNIFICANT CHANGE UP (ref 10–20)
BUN SERPL-MCNC: 14 MG/DL — SIGNIFICANT CHANGE UP (ref 10–20)
CALCIUM SERPL-MCNC: 10.1 MG/DL — SIGNIFICANT CHANGE UP (ref 8.5–10.1)
CALCIUM SERPL-MCNC: 8.7 MG/DL — SIGNIFICANT CHANGE UP (ref 8.5–10.1)
CHLORIDE SERPL-SCNC: 100 MMOL/L — SIGNIFICANT CHANGE UP (ref 98–110)
CHLORIDE SERPL-SCNC: 106 MMOL/L — SIGNIFICANT CHANGE UP (ref 98–110)
CO2 SERPL-SCNC: 20 MMOL/L — SIGNIFICANT CHANGE UP (ref 17–32)
CO2 SERPL-SCNC: 23 MMOL/L — SIGNIFICANT CHANGE UP (ref 17–32)
COLOR SPEC: COLORLESS — SIGNIFICANT CHANGE UP
CREAT SERPL-MCNC: 0.8 MG/DL — SIGNIFICANT CHANGE UP (ref 0.7–1.5)
CREAT SERPL-MCNC: 0.9 MG/DL — SIGNIFICANT CHANGE UP (ref 0.7–1.5)
DIFF PNL FLD: SIGNIFICANT CHANGE UP
EOSINOPHIL # BLD AUTO: 0.31 K/UL — SIGNIFICANT CHANGE UP (ref 0–0.7)
EOSINOPHIL NFR BLD AUTO: 4.6 % — SIGNIFICANT CHANGE UP (ref 0–8)
GAS PNL BLDA: SIGNIFICANT CHANGE UP
GLUCOSE BLDC GLUCOMTR-MCNC: 117 MG/DL — HIGH (ref 70–99)
GLUCOSE BLDC GLUCOMTR-MCNC: 132 MG/DL — HIGH (ref 70–99)
GLUCOSE BLDC GLUCOMTR-MCNC: 144 MG/DL — HIGH (ref 70–99)
GLUCOSE BLDC GLUCOMTR-MCNC: 191 MG/DL — HIGH (ref 70–99)
GLUCOSE BLDC GLUCOMTR-MCNC: 208 MG/DL — HIGH (ref 70–99)
GLUCOSE BLDC GLUCOMTR-MCNC: 212 MG/DL — HIGH (ref 70–99)
GLUCOSE BLDC GLUCOMTR-MCNC: 267 MG/DL — HIGH (ref 70–99)
GLUCOSE SERPL-MCNC: 137 MG/DL — HIGH (ref 70–99)
GLUCOSE SERPL-MCNC: 305 MG/DL — HIGH (ref 70–99)
GLUCOSE UR QL: ABNORMAL
HCT VFR BLD CALC: 26.7 % — LOW (ref 37–47)
HCT VFR BLD CALC: 39.4 % — SIGNIFICANT CHANGE UP (ref 37–47)
HGB BLD-MCNC: 12.9 G/DL — SIGNIFICANT CHANGE UP (ref 12–16)
HGB BLD-MCNC: 8.8 G/DL — LOW (ref 12–16)
IMM GRANULOCYTES NFR BLD AUTO: 0.3 % — SIGNIFICANT CHANGE UP (ref 0.1–0.3)
INR BLD: 1.8 RATIO — HIGH (ref 0.65–1.3)
INR BLD: 1.85 RATIO — HIGH (ref 0.65–1.3)
KETONES UR-MCNC: NEGATIVE — SIGNIFICANT CHANGE UP
LEUKOCYTE ESTERASE UR-ACNC: NEGATIVE — SIGNIFICANT CHANGE UP
LYMPHOCYTES # BLD AUTO: 1.91 K/UL — SIGNIFICANT CHANGE UP (ref 1.2–3.4)
LYMPHOCYTES # BLD AUTO: 28.2 % — SIGNIFICANT CHANGE UP (ref 20.5–51.1)
MAGNESIUM SERPL-MCNC: 2.7 MG/DL — HIGH (ref 1.8–2.4)
MCHC RBC-ENTMCNC: 28.4 PG — SIGNIFICANT CHANGE UP (ref 27–31)
MCHC RBC-ENTMCNC: 28.7 PG — SIGNIFICANT CHANGE UP (ref 27–31)
MCHC RBC-ENTMCNC: 32.7 G/DL — SIGNIFICANT CHANGE UP (ref 32–37)
MCHC RBC-ENTMCNC: 33 G/DL — SIGNIFICANT CHANGE UP (ref 32–37)
MCV RBC AUTO: 86.1 FL — SIGNIFICANT CHANGE UP (ref 81–99)
MCV RBC AUTO: 87.8 FL — SIGNIFICANT CHANGE UP (ref 81–99)
MONOCYTES # BLD AUTO: 0.75 K/UL — HIGH (ref 0.1–0.6)
MONOCYTES NFR BLD AUTO: 11.1 % — HIGH (ref 1.7–9.3)
MRSA PCR RESULT.: NEGATIVE — SIGNIFICANT CHANGE UP
NEUTROPHILS # BLD AUTO: 3.77 K/UL — SIGNIFICANT CHANGE UP (ref 1.4–6.5)
NEUTROPHILS NFR BLD AUTO: 55.5 % — SIGNIFICANT CHANGE UP (ref 42.2–75.2)
NITRITE UR-MCNC: NEGATIVE — SIGNIFICANT CHANGE UP
NRBC # BLD: 0 /100 WBCS — SIGNIFICANT CHANGE UP (ref 0–0)
NRBC # BLD: 0 /100 WBCS — SIGNIFICANT CHANGE UP (ref 0–0)
PH UR: 7 — SIGNIFICANT CHANGE UP (ref 5–8)
PLATELET # BLD AUTO: 134 K/UL — SIGNIFICANT CHANGE UP (ref 130–400)
PLATELET # BLD AUTO: 96 K/UL — LOW (ref 130–400)
POTASSIUM SERPL-MCNC: 4 MMOL/L — SIGNIFICANT CHANGE UP (ref 3.5–5)
POTASSIUM SERPL-MCNC: 5.9 MMOL/L — HIGH (ref 3.5–5)
POTASSIUM SERPL-SCNC: 4 MMOL/L — SIGNIFICANT CHANGE UP (ref 3.5–5)
POTASSIUM SERPL-SCNC: 5.9 MMOL/L — HIGH (ref 3.5–5)
PROT SERPL-MCNC: 5.3 G/DL — LOW (ref 6–8)
PROT SERPL-MCNC: 7.1 G/DL — SIGNIFICANT CHANGE UP (ref 6–8)
PROT UR-MCNC: SIGNIFICANT CHANGE UP
PROTHROM AB SERPL-ACNC: 20.6 SEC — HIGH (ref 9.95–12.87)
PROTHROM AB SERPL-ACNC: 21.1 SEC — HIGH (ref 9.95–12.87)
RBC # BLD: 3.1 M/UL — LOW (ref 4.2–5.4)
RBC # BLD: 4.49 M/UL — SIGNIFICANT CHANGE UP (ref 4.2–5.4)
RBC # FLD: 12.9 % — SIGNIFICANT CHANGE UP (ref 11.5–14.5)
RBC # FLD: 13.2 % — SIGNIFICANT CHANGE UP (ref 11.5–14.5)
SODIUM SERPL-SCNC: 137 MMOL/L — SIGNIFICANT CHANGE UP (ref 135–146)
SODIUM SERPL-SCNC: 138 MMOL/L — SIGNIFICANT CHANGE UP (ref 135–146)
SP GR SPEC: 1.01 — SIGNIFICANT CHANGE UP (ref 1.01–1.02)
TACROLIMUS SERPL-MCNC: 5.4 NG/ML — SIGNIFICANT CHANGE UP
UROBILINOGEN FLD QL: SIGNIFICANT CHANGE UP
WBC # BLD: 11.08 K/UL — HIGH (ref 4.8–10.8)
WBC # BLD: 6.78 K/UL — SIGNIFICANT CHANGE UP (ref 4.8–10.8)
WBC # FLD AUTO: 11.08 K/UL — HIGH (ref 4.8–10.8)
WBC # FLD AUTO: 6.78 K/UL — SIGNIFICANT CHANGE UP (ref 4.8–10.8)

## 2019-10-02 PROCEDURE — 93010 ELECTROCARDIOGRAM REPORT: CPT

## 2019-10-02 PROCEDURE — 33508 ENDOSCOPIC VEIN HARVEST: CPT | Mod: AS

## 2019-10-02 PROCEDURE — 99222 1ST HOSP IP/OBS MODERATE 55: CPT

## 2019-10-02 PROCEDURE — 33533 CABG ARTERIAL SINGLE: CPT

## 2019-10-02 PROCEDURE — 33508 ENDOSCOPIC VEIN HARVEST: CPT

## 2019-10-02 PROCEDURE — 33533 CABG ARTERIAL SINGLE: CPT | Mod: AS

## 2019-10-02 PROCEDURE — 71045 X-RAY EXAM CHEST 1 VIEW: CPT | Mod: 26

## 2019-10-02 PROCEDURE — 93288 INTERROG EVL PM/LDLS PM IP: CPT | Mod: 26

## 2019-10-02 PROCEDURE — 33521 CABG ARTERY-VEIN FOUR: CPT | Mod: AS

## 2019-10-02 PROCEDURE — 99291 CRITICAL CARE FIRST HOUR: CPT

## 2019-10-02 PROCEDURE — 71250 CT THORAX DX C-: CPT | Mod: 26

## 2019-10-02 PROCEDURE — 33521 CABG ARTERY-VEIN FOUR: CPT

## 2019-10-02 PROCEDURE — 36620 INSERTION CATHETER ARTERY: CPT

## 2019-10-02 RX ORDER — SODIUM BICARBONATE 1 MEQ/ML
50 SYRINGE (ML) INTRAVENOUS ONCE
Refills: 0 | Status: COMPLETED | OUTPATIENT
Start: 2019-10-02 | End: 2019-10-02

## 2019-10-02 RX ORDER — ALBUMIN HUMAN 25 %
500 VIAL (ML) INTRAVENOUS ONCE
Refills: 0 | Status: COMPLETED | OUTPATIENT
Start: 2019-10-02 | End: 2019-10-02

## 2019-10-02 RX ORDER — IPRATROPIUM BROMIDE 0.2 MG/ML
2 SOLUTION, NON-ORAL INHALATION EVERY 6 HOURS
Refills: 0 | Status: DISCONTINUED | OUTPATIENT
Start: 2019-10-02 | End: 2019-10-04

## 2019-10-02 RX ORDER — MEPERIDINE HYDROCHLORIDE 50 MG/ML
25 INJECTION INTRAMUSCULAR; INTRAVENOUS; SUBCUTANEOUS ONCE
Refills: 0 | Status: DISCONTINUED | OUTPATIENT
Start: 2019-10-02 | End: 2019-10-09

## 2019-10-02 RX ORDER — OXYCODONE HYDROCHLORIDE 5 MG/1
5 TABLET ORAL EVERY 6 HOURS
Refills: 0 | Status: DISCONTINUED | OUTPATIENT
Start: 2019-10-02 | End: 2019-10-03

## 2019-10-02 RX ORDER — FAMOTIDINE 10 MG/ML
20 INJECTION INTRAVENOUS EVERY 12 HOURS
Refills: 0 | Status: DISCONTINUED | OUTPATIENT
Start: 2019-10-02 | End: 2019-10-03

## 2019-10-02 RX ORDER — NITROGLYCERIN 6.5 MG
30 CAPSULE, EXTENDED RELEASE ORAL
Qty: 50 | Refills: 0 | Status: DISCONTINUED | OUTPATIENT
Start: 2019-10-02 | End: 2019-10-03

## 2019-10-02 RX ORDER — PROPOFOL 10 MG/ML
30 INJECTION, EMULSION INTRAVENOUS
Qty: 1000 | Refills: 0 | Status: DISCONTINUED | OUTPATIENT
Start: 2019-10-02 | End: 2019-10-03

## 2019-10-02 RX ORDER — POLYETHYLENE GLYCOL 3350 17 G/17G
17 POWDER, FOR SOLUTION ORAL DAILY
Refills: 0 | Status: DISCONTINUED | OUTPATIENT
Start: 2019-10-02 | End: 2019-10-18

## 2019-10-02 RX ORDER — NICARDIPINE HYDROCHLORIDE 30 MG/1
5 CAPSULE, EXTENDED RELEASE ORAL
Qty: 40 | Refills: 0 | Status: DISCONTINUED | OUTPATIENT
Start: 2019-10-02 | End: 2019-10-04

## 2019-10-02 RX ORDER — ALBUTEROL 90 UG/1
2 AEROSOL, METERED ORAL EVERY 6 HOURS
Refills: 0 | Status: DISCONTINUED | OUTPATIENT
Start: 2019-10-02 | End: 2019-10-03

## 2019-10-02 RX ORDER — NOREPINEPHRINE BITARTRATE/D5W 8 MG/250ML
0.05 PLASTIC BAG, INJECTION (ML) INTRAVENOUS
Qty: 8 | Refills: 0 | Status: DISCONTINUED | OUTPATIENT
Start: 2019-10-02 | End: 2019-10-03

## 2019-10-02 RX ORDER — SODIUM CHLORIDE 9 MG/ML
1000 INJECTION INTRAMUSCULAR; INTRAVENOUS; SUBCUTANEOUS
Refills: 0 | Status: DISCONTINUED | OUTPATIENT
Start: 2019-10-02 | End: 2019-10-04

## 2019-10-02 RX ORDER — DOCUSATE SODIUM 100 MG
100 CAPSULE ORAL THREE TIMES A DAY
Refills: 0 | Status: DISCONTINUED | OUTPATIENT
Start: 2019-10-02 | End: 2019-10-18

## 2019-10-02 RX ORDER — CHLORHEXIDINE GLUCONATE 213 G/1000ML
5 SOLUTION TOPICAL EVERY 4 HOURS
Refills: 0 | Status: DISCONTINUED | OUTPATIENT
Start: 2019-10-02 | End: 2019-10-03

## 2019-10-02 RX ORDER — DEXTROSE 50 % IN WATER 50 %
50 SYRINGE (ML) INTRAVENOUS
Refills: 0 | Status: DISCONTINUED | OUTPATIENT
Start: 2019-10-02 | End: 2019-10-18

## 2019-10-02 RX ORDER — DEXMEDETOMIDINE HYDROCHLORIDE IN 0.9% SODIUM CHLORIDE 4 UG/ML
0.1 INJECTION INTRAVENOUS
Qty: 200 | Refills: 0 | Status: DISCONTINUED | OUTPATIENT
Start: 2019-10-02 | End: 2019-10-03

## 2019-10-02 RX ORDER — CEFAZOLIN SODIUM 1 G
1000 VIAL (EA) INJECTION EVERY 8 HOURS
Refills: 0 | Status: COMPLETED | OUTPATIENT
Start: 2019-10-02 | End: 2019-10-03

## 2019-10-02 RX ORDER — CHLORHEXIDINE GLUCONATE 213 G/1000ML
15 SOLUTION TOPICAL EVERY 12 HOURS
Refills: 0 | Status: DISCONTINUED | OUTPATIENT
Start: 2019-10-02 | End: 2019-10-03

## 2019-10-02 RX ORDER — FENTANYL CITRATE 50 UG/ML
25 INJECTION INTRAVENOUS ONCE
Refills: 0 | Status: DISCONTINUED | OUTPATIENT
Start: 2019-10-02 | End: 2019-10-02

## 2019-10-02 RX ORDER — VASOPRESSIN 20 [USP'U]/ML
0.04 INJECTION INTRAVENOUS
Qty: 50 | Refills: 0 | Status: DISCONTINUED | OUTPATIENT
Start: 2019-10-02 | End: 2019-10-03

## 2019-10-02 RX ORDER — DEXTROSE 50 % IN WATER 50 %
25 SYRINGE (ML) INTRAVENOUS
Refills: 0 | Status: DISCONTINUED | OUTPATIENT
Start: 2019-10-02 | End: 2019-10-18

## 2019-10-02 RX ORDER — INSULIN HUMAN 100 [IU]/ML
10 INJECTION, SOLUTION SUBCUTANEOUS
Qty: 100 | Refills: 0 | Status: DISCONTINUED | OUTPATIENT
Start: 2019-10-02 | End: 2019-10-04

## 2019-10-02 RX ORDER — ACETAMINOPHEN 500 MG
1000 TABLET ORAL ONCE
Refills: 0 | Status: COMPLETED | OUTPATIENT
Start: 2019-10-02 | End: 2019-10-02

## 2019-10-02 RX ADMIN — Medication 9 MICROGRAM(S)/MIN: at 18:02

## 2019-10-02 RX ADMIN — Medication 6: at 08:26

## 2019-10-02 RX ADMIN — SODIUM CHLORIDE 10 MILLILITER(S): 9 INJECTION INTRAMUSCULAR; INTRAVENOUS; SUBCUTANEOUS at 18:03

## 2019-10-02 RX ADMIN — Medication 50 MILLIEQUIVALENT(S): at 23:00

## 2019-10-02 RX ADMIN — Medication 250 MILLILITER(S): at 23:00

## 2019-10-02 RX ADMIN — Medication 100 MILLIGRAM(S): at 05:58

## 2019-10-02 RX ADMIN — Medication 250 MILLILITER(S): at 20:00

## 2019-10-02 RX ADMIN — Medication 100 MILLIGRAM(S): at 22:21

## 2019-10-02 RX ADMIN — FAMOTIDINE 20 MILLIGRAM(S): 10 INJECTION INTRAVENOUS at 18:33

## 2019-10-02 RX ADMIN — Medication 100 MILLIGRAM(S): at 18:33

## 2019-10-02 RX ADMIN — SODIUM CHLORIDE 3 MILLILITER(S): 9 INJECTION INTRAMUSCULAR; INTRAVENOUS; SUBCUTANEOUS at 05:59

## 2019-10-02 RX ADMIN — Medication 10 MILLIGRAM(S): at 00:38

## 2019-10-02 RX ADMIN — Medication 400 MILLIGRAM(S): at 22:39

## 2019-10-02 RX ADMIN — Medication 1000 MILLIGRAM(S): at 23:00

## 2019-10-02 RX ADMIN — FENTANYL CITRATE 25 MICROGRAM(S): 50 INJECTION INTRAVENOUS at 21:00

## 2019-10-02 RX ADMIN — CHLORHEXIDINE GLUCONATE 5 MILLILITER(S): 213 SOLUTION TOPICAL at 21:22

## 2019-10-02 RX ADMIN — CHLORHEXIDINE GLUCONATE 1 APPLICATION(S): 213 SOLUTION TOPICAL at 05:38

## 2019-10-02 RX ADMIN — FENTANYL CITRATE 25 MICROGRAM(S): 50 INJECTION INTRAVENOUS at 21:15

## 2019-10-02 RX ADMIN — NICARDIPINE HYDROCHLORIDE 25 MG/HR: 30 CAPSULE, EXTENDED RELEASE ORAL at 18:01

## 2019-10-02 RX ADMIN — Medication 112 MICROGRAM(S): at 05:58

## 2019-10-02 RX ADMIN — DEXMEDETOMIDINE HYDROCHLORIDE IN 0.9% SODIUM CHLORIDE 1.7 MICROGRAM(S)/KG/HR: 4 INJECTION INTRAVENOUS at 17:59

## 2019-10-02 RX ADMIN — MUPIROCIN 1 APPLICATION(S): 20 OINTMENT TOPICAL at 05:58

## 2019-10-02 RX ADMIN — Medication 250 MILLILITER(S): at 18:49

## 2019-10-02 RX ADMIN — Medication 250 MILLILITER(S): at 18:02

## 2019-10-02 RX ADMIN — TACROLIMUS 1 MILLIGRAM(S): 5 CAPSULE ORAL at 05:58

## 2019-10-02 RX ADMIN — CHLORHEXIDINE GLUCONATE 15 MILLILITER(S): 213 SOLUTION TOPICAL at 21:16

## 2019-10-02 NOTE — CONSULT NOTE ADULT - SUBJECTIVE AND OBJECTIVE BOX
80 yo F w CAD s/p PCI x 5 (done in [place]) over may years ago (LAD/LCX/OM), last PCI> 5 years ago, PPM ([facility]) complicated by pericardial tamponade s/p pericardiocentesis ([place]). She was seeing Dr. Portillo in the office and he had planned her for a cardiac cath. Based on abnormal NST done in [place] in May 2019.  The patient also has a history of liver transplant done 10 years ago in [place], [place].. She is on TAC.  Patient presented for elective cardiac catheterization based on results of abnormal NST. She denied chest pain, palpitations, dizziness. She admits that prior to her previous stents, her symptoms were not chest pain but HESS.  Cardiac catheterization revealed severe 3vCAD with normal LV function on LV gram.      Now s/p CABG x5, normal EF.  Currently on Cardene, Nitro and Precedex.    Vital Signs Last 24 Hrs  T(C): 36.7 (02 Oct 2019 17:08), Max: 36.7 (02 Oct 2019 17:08)  T(F): 98 (02 Oct 2019 17:08), Max: 98 (02 Oct 2019 17:08)  HR: 90 (02 Oct 2019 17:55) (63 - 90)  BP: 134/77 (02 Oct 2019 08:34) (134/77 - 183/88)  BP(mean): --  RR: 0 (02 Oct 2019 17:25) (0 - 18)  SpO2: 100% (02 Oct 2019 17:55) (95% - 100%)    Sedated, Intubated  clean sternal incision  Right pleural tube with mediastinal sumps  soft abdomen, old, well-healed RUQ incision.  cool LE b/l    INR 1.8, Cr 0.8, K 4  Hg 8.8, plt 96 (as per verbal report in lab).  7.39/35/459, LA 1.4, HCO3 21, Hct 45 -  on A/C, 450, PEEP 5, 100% FiO2.    a/p:    CAD s/p CABG  Acute post thoracotomy pain  Hx of Liver Tx, PPM c/b tamponade    wean sedation  wean vent to extubate  hemodynamic support prn - goal MAP ~ 65  albumin 500 ml given in ICU  Pain control PRN  Glycemic control  DVT and GI proph  ASA, Statin and beta blocker when feasible  Immunosuppresion as at home.    40 minutes of critical care time spent providing medical care for patient's acute illness/conditions that impairs at least one vital organ system and/or poses a high risk of imminent or life threatening deterioration in the patient's condition. It includes time spent evaluating and treating the patient's acute illness as well as time spent reviewing labs, radiology, discussing goals of care with patient and/or patient's family, and discussing the case with a multidisciplinary team in an effort to prevent further life threatening deterioration or end organ damage. This time is independent of any procedures performed.

## 2019-10-02 NOTE — BRIEF OPERATIVE NOTE - NSICDXBRIEFPREOP_GEN_ALL_CORE_FT
PRE-OP DIAGNOSIS:  3-vessel CAD 02-Oct-2019 10:16:15  Shalom Darling PRE-OP DIAGNOSIS:  Abnormal stress test 02-Oct-2019 17:08:50  Willis Mirza  3-vessel CAD 02-Oct-2019 10:16:15  Shalom Darling

## 2019-10-02 NOTE — PROGRESS NOTE ADULT - SUBJECTIVE AND OBJECTIVE BOX
SUBJ:  Patient feels well. She is s/p cardiac catheterization for progressive angina CC class III and abnormal lexiscan nuclear study performed in [place].    MEDICATIONS  (STANDING):  albumin human  5% IVPB 3000 milliLiter(s) IV Intermittent once  aspirin enteric coated 81 milliGRAM(s) Oral daily  atorvastatin 40 milliGRAM(s) Oral at bedtime  chlorhexidine 0.12% Liquid 15 milliLiter(s) Swish and Spit once  dextrose 5%. 1000 milliLiter(s) (50 mL/Hr) IV Continuous <Continuous>  dextrose 50% Injectable 12.5 Gram(s) IV Push once  docusate sodium 100 milliGRAM(s) Oral three times a day  heparin  Injectable 5000 Unit(s) SubCutaneous every 8 hours  insulin glargine Injectable (LANTUS) 15 Unit(s) SubCutaneous every morning  insulin lispro (HumaLOG) corrective regimen sliding scale   SubCutaneous three times a day before meals  insulin lispro Injectable (HumaLOG) 5 Unit(s) SubCutaneous three times a day before meals  levothyroxine 112 MICROGram(s) Oral daily  metoprolol tartrate 25 milliGRAM(s) Oral three times a day  mupirocin 2% Ointment 1 Application(s) Both Nostrils every 12 hours  pantoprazole    Tablet 40 milliGRAM(s) Oral before breakfast  sodium chloride 0.9% lock flush 3 milliLiter(s) IV Push every 8 hours  sodium chloride 0.9%. 1000 milliLiter(s) (50 mL/Hr) IV Continuous <Continuous>  tacrolimus 1 milliGRAM(s) Oral every 12 hours    MEDICATIONS  (PRN):  ALPRAZolam 0.25 milliGRAM(s) Oral every 8 hours PRN anxiety  dextrose 40% Gel 15 Gram(s) Oral once PRN Blood Glucose LESS THAN 70 milliGRAM(s)/deciliter  glucagon  Injectable 1 milliGRAM(s) IntraMuscular once PRN Glucose LESS THAN 70 milligrams/deciliter  zolpidem 5 milliGRAM(s) Oral at bedtime PRN Insomnia            Vital Signs Last 24 Hrs  T(C): 36.2 (02 Oct 2019 05:42), Max: 36.4 (01 Oct 2019 17:24)  T(F): 97.2 (02 Oct 2019 05:42), Max: 97.6 (01 Oct 2019 17:24)  HR: 73 (02 Oct 2019 05:17) (63 - 88)  BP: 134/77 (02 Oct 2019 05:17) (134/77 - 183/88)  BP(mean): --  RR: 18 (02 Oct 2019 05:17) (18 - 18)  SpO2: 95% (02 Oct 2019 01:16) (95% - 96%)    REVIEW OF SYSTEMS:  · EXTREMITIES: No cyanosis, clubbing or edema. RFA cannulation site has healed well. No hematoma, no ecchymosis. Pulses are strong distally.  · VASCULAR: 	Equal and normal pulses (carotid, femoral, dorsalis pedis    ECG:NSR      CONSTITUTIONAL: No fever, weight loss, or fatigue  Patient denies chest pain, shortness of breath or syncopal episodes.       PHYSICAL EXAM:  · CONSTITUTIONAL:	Well-developed, well nourished     ·RESPIRATORY:   airway patent; breath sounds equal; good air movement; respirations non-labored; clear to auscultation bilaterally; no chest wall tenderness; no intercostal retractions; no rales,rhonchi or wheeze  · CARDIOVASCULAR	regular rate and rhythm  no rub  grade II/VI systolic murmur LSB,  normal PMI  LABS:                        12.2   7.17  )-----------( 169      ( 01 Oct 2019 08:43 )             36.9     10-01    136  |  101  |  16  ----------------------------<  268<H>  4.1   |  24  |  0.9    Ca    9.5      01 Oct 2019 08:43    TPro  6.5  /  Alb  3.8  /  TBili  0.3  /  DBili  x   /  AST  19  /  ALT  12  /  AlkPhos  85  10-01        PT/INR - ( 01 Oct 2019 08:43 )   PT: 14.30 sec;   INR: 1.25 ratio         PTT - ( 01 Oct 2019 08:43 )  PTT:29.4 sec    I&O's Summary    01 Oct 2019 07:01  -  02 Oct 2019 06:55  --------------------------------------------------------  IN: 0 mL / OUT: 300 mL / NET: -300 mL      BNP Serum Pro-Brain Natriuretic Peptide: 1017 pg/mL (10-01 @ 08:43)    RADIOLOGY & ADDITIONAL STUDIES: See cardiac catheterization.    IMPRESSION AND PLAN: Severe 3 vessel CAD  History of multivessel coronary stenting at other institutions.  UA  IDDM  PPM  S/P Liver transplant  Rec: Patient is awaiting surgical coronary revascularization with CABG.  I spoke with Dr. Mirza post cardiac catheterization.  Patient opted to be admitted for preop evaluation with plan for CABG.  Maintain present medications.  Patient is advised to follow up with me post discharge after CABG is performed.

## 2019-10-02 NOTE — CONSULT NOTE ADULT - REASON FOR ADMISSION
chest pain and SOB
coronary artery disease with PCI in past presented to hospital for Cardiac cath and found to have further coronary occlusions.
abnormal stress test, elective cath

## 2019-10-02 NOTE — PRE-ANESTHESIA EVALUATION ADULT - NSANTHADDINFOFT_GEN_ALL_CORE
Patient wishes to rescind DNR/DNI for perioperative setting. Long discussion was had with patient, surgical team, bout risks involved with prolonged intubation, and resuscitation during perioperative period

## 2019-10-02 NOTE — DISCHARGE NOTE PROVIDER - CARE PROVIDER_API CALL
Willis Mirza)  Surgery; Thoracic and Cardiac Surgery  [address] [place] [address], [address]  [place] [address]  Phone: [phone]  Fax: [phone]  Follow Up Time:     Ronn Portillo)  Cardiovascular Disease; Internal Medicine; Interventional Cardiology  [address] [place] [address], [address]  [place] [address]  Phone: [phone]  Fax: [phone]  Follow Up Time:

## 2019-10-02 NOTE — BRIEF OPERATIVE NOTE - NSICDXBRIEFPOSTOP_GEN_ALL_CORE_FT
POST-OP DIAGNOSIS:  3-vessel CAD 02-Oct-2019 10:16:24  Shalom Darling POST-OP DIAGNOSIS:  Abnormal stress test 02-Oct-2019 17:09:16  Willis Mirza  3-vessel CAD 02-Oct-2019 10:16:24  Shalom Darling

## 2019-10-02 NOTE — DISCHARGE NOTE PROVIDER - HOSPITAL COURSE
80 yo female with CAD s/p PCI x 5 (done in [place])  over may years (LAD/LCX/OM), last PCI> 5 years ago, PPM ([facility]) complicated by pericardial tamponade s/p pericardiocentesis ([place]) presenting for dyspnea on exertion. She was seeing Dr. Portillo in the office and he had planned her for a cardiac cath. based on an abnormal NST done in [place] in May 2019.  The patient also has a history of liver transplant done 10 years ago in [place], [place].  Patient presented today for elective cardiac catheterization.  She denies chest pain, palpitations, dizziness. She admits that prior to her previous stents, her symptoms were not chest pain but HESS.  Cardiac catheterization revealed severe 3vCAD with normal LV function on LV gram. On 10/03/2019, she underwent myocardial revascularization. 80 yo female with CAD s/p PCI x 5 (done in [place])  over may years (LAD/LCX/OM), last PCI> 5 years ago, PPM ([facility]) complicated by pericardial tamponade s/p pericardiocentesis ([place]) presenting for dyspnea on exertion. She was seeing Dr. Portillo in the office and he had planned her for a cardiac cath. based on an abnormal NST done in [place] in May 2019.  The patient also has a history of liver transplant done 10 years ago in [place], [place].  Patient presented today for elective cardiac catheterization.  She denies chest pain, palpitations, dizziness. She admits that prior to her previous stents, her symptoms were not chest pain but HESS.  Cardiac catheterization revealed severe 3vCAD with normal LV function on LV gram. On 10/03/2019, she underwent myocardial revascularization.    Postoperatively the patient developed atrial fibrillation but converted to SR with b blocker and cardizem.  GI evaluated the patient and recommended that the patient refrain from being treated with Amio due to her liver transplant.  She was then discharged to [facility]  on POD # 16 after much resistance from her family re nursing home preference.

## 2019-10-02 NOTE — DISCHARGE NOTE PROVIDER - NSDCFUADDINST_GEN_ALL_CORE_FT
please avoid any heavy lifting, pushing, or pulling anything > 10 lbs x 3 months; please no driving or sitting in the front seat or sleeping on side x 6 weeks; check temp and weight daily and shower daily  ***pt needs tacrolimus level checked regularly to ensure proper dosing

## 2019-10-03 LAB
ALBUMIN SERPL ELPH-MCNC: 4.8 G/DL — SIGNIFICANT CHANGE UP (ref 3.5–5.2)
ALP SERPL-CCNC: 40 U/L — SIGNIFICANT CHANGE UP (ref 30–115)
ALT FLD-CCNC: 22 U/L — SIGNIFICANT CHANGE UP (ref 0–41)
ANION GAP SERPL CALC-SCNC: 16 MMOL/L — HIGH (ref 7–14)
APTT BLD: 32.9 SEC — SIGNIFICANT CHANGE UP (ref 27–39.2)
AST SERPL-CCNC: 47 U/L — HIGH (ref 0–41)
BASE EXCESS BLDMV CALC-SCNC: -3 MMOL/L — SIGNIFICANT CHANGE UP
BASOPHILS # BLD AUTO: 0.02 K/UL — SIGNIFICANT CHANGE UP (ref 0–0.2)
BASOPHILS NFR BLD AUTO: 0.2 % — SIGNIFICANT CHANGE UP (ref 0–1)
BILIRUB SERPL-MCNC: 1.3 MG/DL — HIGH (ref 0.2–1.2)
BUN SERPL-MCNC: 12 MG/DL — SIGNIFICANT CHANGE UP (ref 10–20)
CALCIUM SERPL-MCNC: 9.1 MG/DL — SIGNIFICANT CHANGE UP (ref 8.5–10.1)
CHLORIDE SERPL-SCNC: 106 MMOL/L — SIGNIFICANT CHANGE UP (ref 98–110)
CO2 SERPL-SCNC: 20 MMOL/L — SIGNIFICANT CHANGE UP (ref 17–32)
CREAT SERPL-MCNC: 0.9 MG/DL — SIGNIFICANT CHANGE UP (ref 0.7–1.5)
EOSINOPHIL # BLD AUTO: 0.03 K/UL — SIGNIFICANT CHANGE UP (ref 0–0.7)
EOSINOPHIL NFR BLD AUTO: 0.4 % — SIGNIFICANT CHANGE UP (ref 0–8)
GAS PNL BLDA: SIGNIFICANT CHANGE UP
GAS PNL BLDMV: SIGNIFICANT CHANGE UP
GLUCOSE BLDC GLUCOMTR-MCNC: 110 MG/DL — HIGH (ref 70–99)
GLUCOSE BLDC GLUCOMTR-MCNC: 111 MG/DL — HIGH (ref 70–99)
GLUCOSE BLDC GLUCOMTR-MCNC: 122 MG/DL — HIGH (ref 70–99)
GLUCOSE BLDC GLUCOMTR-MCNC: 144 MG/DL — HIGH (ref 70–99)
GLUCOSE BLDC GLUCOMTR-MCNC: 154 MG/DL — HIGH (ref 70–99)
GLUCOSE BLDC GLUCOMTR-MCNC: 163 MG/DL — HIGH (ref 70–99)
GLUCOSE BLDC GLUCOMTR-MCNC: 299 MG/DL — HIGH (ref 70–99)
GLUCOSE BLDC GLUCOMTR-MCNC: 60 MG/DL — LOW (ref 70–99)
GLUCOSE BLDC GLUCOMTR-MCNC: 70 MG/DL — SIGNIFICANT CHANGE UP (ref 70–99)
GLUCOSE BLDC GLUCOMTR-MCNC: 72 MG/DL — SIGNIFICANT CHANGE UP (ref 70–99)
GLUCOSE SERPL-MCNC: 164 MG/DL — HIGH (ref 70–99)
HCO3 BLDMV-SCNC: 23 MMOL/L — SIGNIFICANT CHANGE UP
HCT VFR BLD CALC: 19.8 % — LOW (ref 37–47)
HCT VFR BLD CALC: 30.2 % — LOW (ref 37–47)
HCT VFR BLD CALC: 30.3 % — LOW (ref 37–47)
HGB BLD-MCNC: 10 G/DL — LOW (ref 12–16)
HGB BLD-MCNC: 10.2 G/DL — LOW (ref 12–16)
HGB BLD-MCNC: 6.7 G/DL — CRITICAL LOW (ref 12–16)
IMM GRANULOCYTES NFR BLD AUTO: 0.2 % — SIGNIFICANT CHANGE UP (ref 0.1–0.3)
INR BLD: 1.75 RATIO — HIGH (ref 0.65–1.3)
LYMPHOCYTES # BLD AUTO: 0.66 K/UL — LOW (ref 1.2–3.4)
LYMPHOCYTES # BLD AUTO: 8.1 % — LOW (ref 20.5–51.1)
MAGNESIUM SERPL-MCNC: 2 MG/DL — SIGNIFICANT CHANGE UP (ref 1.8–2.4)
MCHC RBC-ENTMCNC: 29.2 PG — SIGNIFICANT CHANGE UP (ref 27–31)
MCHC RBC-ENTMCNC: 29.5 PG — SIGNIFICANT CHANGE UP (ref 27–31)
MCHC RBC-ENTMCNC: 29.6 PG — SIGNIFICANT CHANGE UP (ref 27–31)
MCHC RBC-ENTMCNC: 33.1 G/DL — SIGNIFICANT CHANGE UP (ref 32–37)
MCHC RBC-ENTMCNC: 33.7 G/DL — SIGNIFICANT CHANGE UP (ref 32–37)
MCHC RBC-ENTMCNC: 33.8 G/DL — SIGNIFICANT CHANGE UP (ref 32–37)
MCV RBC AUTO: 87.2 FL — SIGNIFICANT CHANGE UP (ref 81–99)
MCV RBC AUTO: 87.8 FL — SIGNIFICANT CHANGE UP (ref 81–99)
MCV RBC AUTO: 88 FL — SIGNIFICANT CHANGE UP (ref 81–99)
MONOCYTES # BLD AUTO: 1 K/UL — HIGH (ref 0.1–0.6)
MONOCYTES NFR BLD AUTO: 12.3 % — HIGH (ref 1.7–9.3)
NEUTROPHILS # BLD AUTO: 6.37 K/UL — SIGNIFICANT CHANGE UP (ref 1.4–6.5)
NEUTROPHILS NFR BLD AUTO: 78.8 % — HIGH (ref 42.2–75.2)
NRBC # BLD: 0 /100 WBCS — SIGNIFICANT CHANGE UP (ref 0–0)
O2 CT VFR BLD CALC: 35 MMHG — SIGNIFICANT CHANGE UP (ref 35–40)
PCO2 BLDMV: 46 MMHG — SIGNIFICANT CHANGE UP (ref 41–51)
PH BLDMV: 7.31 — LOW (ref 7.33–7.44)
PLATELET # BLD AUTO: 74 K/UL — LOW (ref 130–400)
PLATELET # BLD AUTO: 81 K/UL — LOW (ref 130–400)
PLATELET # BLD AUTO: 87 K/UL — LOW (ref 130–400)
POTASSIUM SERPL-MCNC: 3.8 MMOL/L — SIGNIFICANT CHANGE UP (ref 3.5–5)
POTASSIUM SERPL-SCNC: 3.8 MMOL/L — SIGNIFICANT CHANGE UP (ref 3.5–5)
PROT SERPL-MCNC: 5.8 G/DL — LOW (ref 6–8)
PROTHROM AB SERPL-ACNC: 20 SEC — HIGH (ref 9.95–12.87)
RBC # BLD: 2.27 M/UL — LOW (ref 4.2–5.4)
RBC # BLD: 3.43 M/UL — LOW (ref 4.2–5.4)
RBC # BLD: 3.45 M/UL — LOW (ref 4.2–5.4)
RBC # FLD: 13.2 % — SIGNIFICANT CHANGE UP (ref 11.5–14.5)
RBC # FLD: 13.6 % — SIGNIFICANT CHANGE UP (ref 11.5–14.5)
RBC # FLD: 13.9 % — SIGNIFICANT CHANGE UP (ref 11.5–14.5)
SAO2 % BLDMV: 67 % — LOW (ref 70–75)
SODIUM SERPL-SCNC: 142 MMOL/L — SIGNIFICANT CHANGE UP (ref 135–146)
WBC # BLD: 11.89 K/UL — HIGH (ref 4.8–10.8)
WBC # BLD: 6.89 K/UL — SIGNIFICANT CHANGE UP (ref 4.8–10.8)
WBC # BLD: 8.1 K/UL — SIGNIFICANT CHANGE UP (ref 4.8–10.8)
WBC # FLD AUTO: 11.89 K/UL — HIGH (ref 4.8–10.8)
WBC # FLD AUTO: 6.89 K/UL — SIGNIFICANT CHANGE UP (ref 4.8–10.8)
WBC # FLD AUTO: 8.1 K/UL — SIGNIFICANT CHANGE UP (ref 4.8–10.8)

## 2019-10-03 PROCEDURE — 99233 SBSQ HOSP IP/OBS HIGH 50: CPT

## 2019-10-03 PROCEDURE — 71045 X-RAY EXAM CHEST 1 VIEW: CPT | Mod: 26,76

## 2019-10-03 PROCEDURE — 71045 X-RAY EXAM CHEST 1 VIEW: CPT | Mod: 26

## 2019-10-03 PROCEDURE — 93010 ELECTROCARDIOGRAM REPORT: CPT

## 2019-10-03 RX ORDER — METOPROLOL TARTRATE 50 MG
12.5 TABLET ORAL
Refills: 0 | Status: DISCONTINUED | OUTPATIENT
Start: 2019-10-03 | End: 2019-10-05

## 2019-10-03 RX ORDER — LEVOTHYROXINE SODIUM 125 MCG
112 TABLET ORAL DAILY
Refills: 0 | Status: DISCONTINUED | OUTPATIENT
Start: 2019-10-03 | End: 2019-10-18

## 2019-10-03 RX ORDER — KETOROLAC TROMETHAMINE 30 MG/ML
15 SYRINGE (ML) INJECTION ONCE
Refills: 0 | Status: DISCONTINUED | OUTPATIENT
Start: 2019-10-03 | End: 2019-10-03

## 2019-10-03 RX ORDER — FENTANYL CITRATE 50 UG/ML
25 INJECTION INTRAVENOUS ONCE
Refills: 0 | Status: DISCONTINUED | OUTPATIENT
Start: 2019-10-03 | End: 2019-10-03

## 2019-10-03 RX ORDER — POTASSIUM CHLORIDE 20 MEQ
20 PACKET (EA) ORAL ONCE
Refills: 0 | Status: COMPLETED | OUTPATIENT
Start: 2019-10-03 | End: 2019-10-03

## 2019-10-03 RX ORDER — TACROLIMUS 5 MG/1
1 CAPSULE ORAL EVERY 12 HOURS
Refills: 0 | Status: DISCONTINUED | OUTPATIENT
Start: 2019-10-03 | End: 2019-10-05

## 2019-10-03 RX ORDER — FAMOTIDINE 10 MG/ML
20 INJECTION INTRAVENOUS
Refills: 0 | Status: DISCONTINUED | OUTPATIENT
Start: 2019-10-03 | End: 2019-10-18

## 2019-10-03 RX ORDER — OXYCODONE HYDROCHLORIDE 5 MG/1
5 TABLET ORAL ONCE
Refills: 0 | Status: DISCONTINUED | OUTPATIENT
Start: 2019-10-03 | End: 2019-10-05

## 2019-10-03 RX ORDER — OXYCODONE HYDROCHLORIDE 5 MG/1
10 TABLET ORAL EVERY 4 HOURS
Refills: 0 | Status: DISCONTINUED | OUTPATIENT
Start: 2019-10-03 | End: 2019-10-09

## 2019-10-03 RX ORDER — IPRATROPIUM/ALBUTEROL SULFATE 18-103MCG
3 AEROSOL WITH ADAPTER (GRAM) INHALATION EVERY 6 HOURS
Refills: 0 | Status: DISCONTINUED | OUTPATIENT
Start: 2019-10-03 | End: 2019-10-04

## 2019-10-03 RX ADMIN — Medication 15 MILLIGRAM(S): at 12:25

## 2019-10-03 RX ADMIN — CHLORHEXIDINE GLUCONATE 15 MILLILITER(S): 213 SOLUTION TOPICAL at 06:12

## 2019-10-03 RX ADMIN — Medication 100 MILLIGRAM(S): at 13:01

## 2019-10-03 RX ADMIN — Medication 100 MILLIGRAM(S): at 22:39

## 2019-10-03 RX ADMIN — FENTANYL CITRATE 25 MICROGRAM(S): 50 INJECTION INTRAVENOUS at 09:35

## 2019-10-03 RX ADMIN — FENTANYL CITRATE 25 MICROGRAM(S): 50 INJECTION INTRAVENOUS at 09:50

## 2019-10-03 RX ADMIN — Medication 100 MILLIGRAM(S): at 06:13

## 2019-10-03 RX ADMIN — Medication 100 MILLIEQUIVALENT(S): at 06:00

## 2019-10-03 RX ADMIN — OXYCODONE HYDROCHLORIDE 10 MILLIGRAM(S): 5 TABLET ORAL at 16:27

## 2019-10-03 RX ADMIN — Medication 15 MILLIGRAM(S): at 10:00

## 2019-10-03 RX ADMIN — FAMOTIDINE 20 MILLIGRAM(S): 10 INJECTION INTRAVENOUS at 06:57

## 2019-10-03 RX ADMIN — Medication 15 MILLIGRAM(S): at 10:30

## 2019-10-03 RX ADMIN — Medication 12.5 MILLIGRAM(S): at 17:55

## 2019-10-03 RX ADMIN — FENTANYL CITRATE 25 MICROGRAM(S): 50 INJECTION INTRAVENOUS at 06:15

## 2019-10-03 RX ADMIN — TACROLIMUS 1 MILLIGRAM(S): 5 CAPSULE ORAL at 17:55

## 2019-10-03 RX ADMIN — OXYCODONE HYDROCHLORIDE 10 MILLIGRAM(S): 5 TABLET ORAL at 15:57

## 2019-10-03 RX ADMIN — POLYETHYLENE GLYCOL 3350 17 GRAM(S): 17 POWDER, FOR SOLUTION ORAL at 13:00

## 2019-10-03 RX ADMIN — OXYCODONE HYDROCHLORIDE 5 MILLIGRAM(S): 5 TABLET ORAL at 06:57

## 2019-10-03 RX ADMIN — NICARDIPINE HYDROCHLORIDE 25 MG/HR: 30 CAPSULE, EXTENDED RELEASE ORAL at 06:11

## 2019-10-03 RX ADMIN — Medication 100 MILLIGRAM(S): at 06:57

## 2019-10-03 RX ADMIN — FENTANYL CITRATE 25 MICROGRAM(S): 50 INJECTION INTRAVENOUS at 06:00

## 2019-10-03 RX ADMIN — TACROLIMUS 1 MILLIGRAM(S): 5 CAPSULE ORAL at 09:35

## 2019-10-03 RX ADMIN — Medication 15 MILLIGRAM(S): at 11:55

## 2019-10-03 RX ADMIN — OXYCODONE HYDROCHLORIDE 5 MILLIGRAM(S): 5 TABLET ORAL at 07:29

## 2019-10-03 RX ADMIN — Medication 112 MICROGRAM(S): at 09:35

## 2019-10-03 RX ADMIN — CHLORHEXIDINE GLUCONATE 5 MILLILITER(S): 213 SOLUTION TOPICAL at 06:13

## 2019-10-03 RX ADMIN — FAMOTIDINE 20 MILLIGRAM(S): 10 INJECTION INTRAVENOUS at 17:56

## 2019-10-03 NOTE — PHYSICAL THERAPY INITIAL EVALUATION ADULT - GENERAL OBSERVATIONS, REHAB EVAL
10:25- 10:30 5 min Chart reviewed, attempted to see pt for PT IE, however pt still with SWAN in place, will f/u for IE once SWAN is removed
Chart reviewed. Pt encountered in chair +tele, +3L O2 NC, +R MLC, +A-line, +CT, +suarez, +pulse ox, + bilateral PCD, +BP Cuff. Daughter present throughout tx session.

## 2019-10-03 NOTE — PROGRESS NOTE ADULT - SUBJECTIVE AND OBJECTIVE BOX
NEPTALI ANNA  MRN#: [id number]  Subjective:  Patient was seen and evalauted on AM rounds     OBJECTIVE:  ICU Vital Signs Last 24 Hrs  T(C): 36.7 (03 Oct 2019 09:00), Max: 37.6 (02 Oct 2019 22:00)  T(F): 98 (03 Oct 2019 09:00), Max: 99.7 (02 Oct 2019 22:00)  HR: 90 (03 Oct 2019 09:) (89 - 90)  BP: --  BP(mean): --  ABP: 115/47 (03 Oct 2019 09:00) (91/47 - 148/55)  ABP(mean): 72 (03 Oct 2019 09:00) (60 - 89)  RR: 38 (03 Oct 2019 09:) (0 - 38)  SpO2: 99% (03 Oct 2019 09:00) (96% - 100%)      10-02 @ 07:01  -  10-03 @ 07:00  --------------------------------------------------------  IN: 3686.8 mL / OUT: 1463 mL / NET: 2223.8 mL    10-03 @ :01  -  10-03 @ 12:09  --------------------------------------------------------  IN: 170 mL / OUT: 100 mL / NET: 70 mL      CAPILLARY BLOOD GLUCOSE  121 (03 Oct 2019 04:00)      POCT Blood Glucose.: 111 mg/dL (03 Oct 2019 07:04)      PHYSICAL EXAM:Daily     Daily Weight in k.4 (03 Oct 2019 07:00)  General: WN/WD NAD    HEENT:     + NCAT  + EOMI  - Conjuctival edema   - Icterus   - Thrush   - ETT  - NGT/OGT    Neck:         + FROM    - JVD     - Nodes     - Masses    + Mid-line trachea   - Tracheostomy    Chest:         - Sternal click  - Sternal drainage  + Pacing wires  + Chest tubes  - SubQ emphysema    Lungs:          + CTA   - Rhonchi    - Rales    - Wheezing     - Decreased BS   - Dullness R L    Cardiac:       + S1 + S2    + RRR   - Irregular   - S3  - S4    - Murmurs   - Rub   - Hamman’s sign     Abdomen:    + BS     + Soft    + Non-tender     - Distended    - Organomegaly  - PEG    Extremities:   - Cyanosis U/L   - Clubbing  U/L  + LE Edema   + Capillary refill    + Pulses     Neuro:        + Awake   +  Alert   - Confused   - Lethargic   - Sedated   - Generalized Weakness    Skin:        - Rashes    - Erythema   + Normal incisions   + IV sites intact  - Sacral decubitus    HOSPITAL MEDICATIONS:  MEDICATIONS  (STANDING):  ALBUTerol/ipratropium for Nebulization 3 milliLiter(s) Nebulizer every 6 hours  dextrose 50% Injectable 50 milliLiter(s) IV Push every 15 minutes  dextrose 50% Injectable 25 milliLiter(s) IV Push every 15 minutes  docusate sodium 100 milliGRAM(s) Oral three times a day  famotidine    Tablet 20 milliGRAM(s) Oral two times a day  fentaNYL    Injectable 25 MICROGram(s) IV Push once  insulin regular Infusion 10 Unit(s)/Hr (10 mL/Hr) IV Continuous <Continuous>  ipratropium 17 MICROgram(s) HFA Inhaler 2 Puff(s) Inhalation every 6 hours  ketorolac   Injectable 15 milliGRAM(s) IV Push once  levothyroxine 112 MICROGram(s) Oral daily  meperidine     Injectable 25 milliGRAM(s) IV Push once  metoprolol tartrate 12.5 milliGRAM(s) Oral two times a day  niCARdipine Infusion 5 mG/Hr (25 mL/Hr) IV Continuous <Continuous>  polyethylene glycol 3350 17 Gram(s) Oral daily  sodium chloride 0.9%. 1000 milliLiter(s) (10 mL/Hr) IV Continuous <Continuous>  tacrolimus 1 milliGRAM(s) Oral every 12 hours    MEDICATIONS  (PRN):  oxyCODONE    IR 10 milliGRAM(s) Oral every 4 hours PRN Severe Pain (7 - 10)  oxyCODONE    IR 5 milliGRAM(s) Oral once PRN Moderate Pain (4 - 6)      LABS:                        10.2   8.10  )-----------( 81       ( 03 Oct 2019 07:20 )             30.3    10-03    142  |  106  |  12  ----------------------------<  164<H>  3.8   |  20  |  0.9    Ca    9.1      03 Oct 2019 02:50  Mg     2.0     10-03    TPro  5.8<L>  /  Alb  4.8  /  TBili  1.3<H>  /  DBili  x   /  AST  47<H>  /  ALT  22  /  AlkPhos  40  10-03    PT/INR - ( 03 Oct 2019 02:50 )   PT: 20.00 sec;   INR: 1.75 ratio         PTT - ( 03 Oct 2019 02:50 )  PTT:32.9 sec LIVER FUNCTIONS - ( 03 Oct 2019 02:50 )  Alb: 4.8 g/dL / Pro: 5.8 g/dL / ALK PHOS: 40 U/L / ALT: 22 U/L / AST: 47 U/L / GGT: x           Urinalysis Basic - ( 02 Oct 2019 00:00 )    Color: Colorless / Appearance: Clear / S.010 / pH: x  Gluc: x / Ketone: Negative  / Bili: Negative / Urobili: <2 mg/dL   Blood: x / Protein: Trace / Nitrite: Negative   Leuk Esterase: Negative / RBC: x / WBC x   Sq Epi: x / Non Sq Epi: x / Bacteria: x        RADIOLOGY:  X Reviewed and interpreted by me:    CARDIOPULMONARY DYSFUNCTION  - Respiratory status required supplemental oxygen & the following of continuous pulse oximetry for support & to prevent decompensation  - Continued early mobilization as tolerated  - Addressed analgesic regimen to optimize function    PREVENTION-PROPHYLAXIS  - VTE prophylaxis-Venodyne boots  - Pepcid maintained for GI bleeding prophylaxis  - Lopressor initiated for atrial fibrillation prophylaxis  - Metabolic stability & infection prophylaxis required review and adjustment of regular Insulin sliding scale and gylcemic regimen while following serial glucose levels to help achieve & maintain euglycemia  - Reviewed & addressed surgical site infection prophylaxis regimen

## 2019-10-03 NOTE — PHYSICAL THERAPY INITIAL EVALUATION ADULT - PHYSICAL ASSIST/NONPHYSICAL ASSIST: SIT/STAND, REHAB EVAL
1 person assist/Chair. Cues for sequencing, safety, hand placement./verbal cues/nonverbal cues (demo/gestures)

## 2019-10-03 NOTE — PHYSICAL THERAPY INITIAL EVALUATION ADULT - PHYSICAL ASSIST/NONPHYSICAL ASSIST: GAIT, REHAB EVAL
1 person + 1 person to manage equipment/nonverbal cues (demo/gestures)/Cues for posture, breathing technique, and safety./verbal cues

## 2019-10-03 NOTE — PROGRESS NOTE ADULT - ASSESSMENT
Assessment/Plan:  CAD-s/p CABG x 5-POD #1  1-BP control-start beta-blockers  2-serum glucose control-insulin infusion  3-acute blood loss anemia-transfused 2 units pRBCs-f/u repeat CBC  7-sgdnwuujjckduqfg-vxydvb, continue to monitor plts daily  5-hx liver transplant-restart Tacrolimus  5-qbyuygilleb-dwjdmnbx synthroid

## 2019-10-03 NOTE — ANESTHESIA FOLLOW-UP NOTE - NSEVALATIONFT_GEN_ALL_CORE
-patient did receive 2U PRBC overnight for drop in hemoglobin.  Lying in bed hemodynamically stable on examination this AM

## 2019-10-03 NOTE — PROGRESS NOTE ADULT - SUBJECTIVE AND OBJECTIVE BOX
OPERATIVE PROCEDURE(s):                POD #                       SURGEON(s): JUAREZ Mirza  SUBJECTIVE ASSESSMENT:79yFemale patient seen and examined at bedside.    Vital Signs Last 24 Hrs  T(F): 99.1 (03 Oct 2019 06:00), Max: 99.7 (02 Oct 2019 22:00)  HR: 90 (03 Oct 2019 07:00) (73 - 90)  BP: 134/77 (02 Oct 2019 08:34) (134/77 - 134/77)  BP(mean): --  ABP: 127/52 (03 Oct 2019 07:00) (91/47 - 148/55)  ABP(mean): 78 (03 Oct 2019 07:00)  RR: 38 (03 Oct 2019 07:00) (0 - 38)  SpO2: 98% (03 Oct 2019 07:00) (96% - 100%)  CVP(mm Hg): 12 (03 Oct 2019 07:00)  CVP(cm H2O): --  CO: 3.7 (03 Oct 2019 04:30)  CI: 2.1 (03 Oct 2019 04:30)  PA: 39/17 (03 Oct 2019 07:00)  SVR: 1598 (03 Oct 2019 04:30)  Mode: CPAP with PS  FiO2: 40  PEEP: 5  PS: 12    I&O's Detail    02 Oct 2019 07:01  -  03 Oct 2019 07:00  --------------------------------------------------------  IN:    Albumin 5%  - 500 mL: 2000 mL    dexmedetomidine Infusion: 20.8 mL    insulin regular Infusion: 33 mL    IV PiggyBack: 300 mL    niCARdipine Infusion: 235 mL    nitroglycerin  Infusion: 8 mL    Oral Fluid: 140 mL    Packed Red Blood Cells: 800 mL    sodium chloride 0.9%.: 150 mL  Total IN: 3686.8 mL    OUT:    Chest Tube: 190 mL    Chest Tube: 185 mL    Indwelling Catheter - Urethral: 1088 mL  Total OUT: 1463 mL        Net: I&O's Detail    01 Oct 2019 07:01  -  02 Oct 2019 07:00  --------------------------------------------------------  Total NET: -300 mL      02 Oct 2019 07:01  -  03 Oct 2019 07:00  --------------------------------------------------------  Total NET: 2223.8 mL        CAPILLARY BLOOD GLUCOSE  121 (03 Oct 2019 04:00)  99 (03 Oct 2019 01:00)  168 (02 Oct 2019 20:00)      POCT Blood Glucose.: 111 mg/dL (03 Oct 2019 07:04)  POCT Blood Glucose.: 70 mg/dL (03 Oct 2019 05:58)  POCT Blood Glucose.: 60 mg/dL (03 Oct 2019 05:52)  POCT Blood Glucose.: 154 mg/dL (03 Oct 2019 02:50)  POCT Blood Glucose.: 132 mg/dL (02 Oct 2019 23:13)  POCT Blood Glucose.: 117 mg/dL (02 Oct 2019 21:59)  POCT Blood Glucose.: 144 mg/dL (02 Oct 2019 21:10)  POCT Blood Glucose.: 191 mg/dL (02 Oct 2019 19:19)      Physical Exam:  General: NAD; A&Ox3  Cardiac: S1/S2, RRR, no murmur, no rubs  Lungs: unlabored respirations, CTA b/l, no wheeze, no rales, no crackles  Abdomen: Soft/NT/ND; positive bowel sounds x 4  Sternum: Intact, no click, incision healing well with no drainage  Incisions: Incisions clean/dry/intact  Extremities: No edema b/l lower extremities; good capillary refill; no cyanosis; palpable 1+ pedal pulses b/l    Central Venous Catheter: Yes[]  No[] , If Yes indication:                    Day #  Temple Catheter: Yes  [] , No  [] , If yes indication:                                 Day #  NGT: Yes [] No [] ,    If Yes Placement:                                                   Day #  EPICARDIAL WIRES:  [] YES [] NO                                                            Day #  BOWEL MOVEMENT:  [] YES [] NO, If No, Timing since last BM Day #  CHEST TUBE(Left/Right):  [] YES [] NO, If yes -  AIR LEAKS:  [] YES [] NO        LABS:                        10.2<L>  8.10  )-----------( 81<L>    ( 03 Oct 2019 07:20 )             30.3<L>                        6.7<LL>  6.89  )-----------( 74<L>    ( 02 Oct 2019 23:24 )             19.8<L>    10-03    142  |  106  |  12  ----------------------------<  164<H>  3.8   |  20  |  0.9  10-02    138  |  106  |  11  ----------------------------<  137<H>  4.0   |  20  |  0.8    Ca    9.1      03 Oct 2019 02:50  Mg     2.0     10    TPro  5.8<L> [6.0 - 8.0]  /  Alb  4.8 [3.5 - 5.2]  /  TBili  1.3<H> [0.2 - 1.2]  /  DBili  x   /  AST  47<H> [0 - 41]  /  ALT  22 [0 - 41]  /  AlkPhos  40 [30 - 115]  1003    PT/INR - ( 03 Oct 2019 02:50 )   PT: ;   INR: 1.75 ratio       PT/INR - ( 02 Oct 2019 23:24 )   PT: ;   INR: 1.85 ratio       PTT - ( 03 Oct 2019 02:50 )  PTT:32.9 sec, PTT - ( 02 Oct 2019 23:24 )  PTT:32.4 sec    Urinalysis Basic - ( 02 Oct 2019 00:00 )    Color: Colorless / Appearance: Clear / S.010 / pH: x  Gluc: x / Ketone: Negative  / Bili: Negative / Urobili: <2 mg/dL   Blood: x / Protein: Trace / Nitrite: Negative   Leuk Esterase: Negative / RBC: x / WBC x   Sq Epi: x / Non Sq Epi: x / Bacteria: x      ABG - ( 03 Oct 2019 02:54 )  pH: 7.34  /  pCO2: 41    /  pO2: 118   / HCO3: 22    / Base Excess: -3.5  /  SaO2: 99    /  LA: 1.2      Hemoglobin A1C, Whole Blood: 8.8      RADIOLOGY & ADDITIONAL TESTS:  CXR:   EKG:    Allergies    No Known Drug Allergies  TEGADERM (Rash)    Intolerances      MEDICATIONS  (STANDING):  ALBUTerol    90 MICROgram(s) HFA Inhaler 2 Puff(s) Inhalation every 6 hours  chlorhexidine 0.12% Liquid 5 milliLiter(s) Oral Mucosa every 4 hours  chlorhexidine 0.12% Liquid 15 milliLiter(s) Oral Mucosa every 12 hours  dexmedetomidine Infusion 0.1 MICROgram(s)/kG/Hr (1.698 mL/Hr) IV Continuous <Continuous>  dextrose 50% Injectable 50 milliLiter(s) IV Push every 15 minutes  dextrose 50% Injectable 25 milliLiter(s) IV Push every 15 minutes  docusate sodium 100 milliGRAM(s) Oral three times a day  famotidine Injectable 20 milliGRAM(s) IV Push every 12 hours  fentaNYL    Injectable 25 MICROGram(s) IV Push once  insulin regular Infusion 10 Unit(s)/Hr (10 mL/Hr) IV Continuous <Continuous>  ipratropium 17 MICROgram(s) HFA Inhaler 2 Puff(s) Inhalation every 6 hours  meperidine     Injectable 25 milliGRAM(s) IV Push once  niCARdipine Infusion 5 mG/Hr (25 mL/Hr) IV Continuous <Continuous>  nitroglycerin  Infusion 30 MICROgram(s)/Min (9 mL/Hr) IV Continuous <Continuous>  norepinephrine Infusion 0.05 MICROgram(s)/kG/Min (6.366 mL/Hr) IV Continuous <Continuous>  polyethylene glycol 3350 17 Gram(s) Oral daily  propofol Infusion 30 MICROgram(s)/kG/Min (12.222 mL/Hr) IV Continuous <Continuous>  sodium chloride 0.9%. 1000 milliLiter(s) (50 mL/Hr) IV Continuous <Continuous>  sodium chloride 0.9%. 1000 milliLiter(s) (10 mL/Hr) IV Continuous <Continuous>  vasopressin Infusion 0.04 Unit(s)/Min (2.4 mL/Hr) IV Continuous <Continuous>    MEDICATIONS  (PRN):  oxyCODONE    IR 5 milliGRAM(s) Oral every 6 hours PRN Moderate Pain (4 - 6)      Pharmacologic DVT Prophylaxis: [] YES, []NO: Contraindication:   [] HEPARIN: Dose: XX mg  Q24H    [] LOVENOX: Dose: XX mg  Q24H                 SCD's: YES b/l    GI Prophylaxis: Protonix [], Pepcid []    Post-Op Beta-Blockers: []Yes, []No: contraindication:  Post-Op Nitrate: []Yes, []No: contraindication:  Post-Op Aspirin: []Yes,  []No: contraindication:  Post-Op Statin: []Yes, []No: contraindication:      Ambulation/Activity Status:    Assessment/Plan:  79y Female status-post  - Case and plan discussed with CTU Intensivist and CT Surgeon - Dr. Leon/Yuki/Martin   - Continue CTU supportive care and ongoing plan of care as per continuing CTU rounds.    - Continue DVT/GI prophylaxis  - Incentive Spirometry 10 times an hour  - Continue to advance physical activity as tolerated and continue PT/OT as directed  1. CAD: Continue ASA, statin, BB  2. HTN:   3. A. Fib:   4. COPD/Hypoxia:   5. DM/Glucose Control:     Social Service Disposition: OPERATIVE PROCEDURE(s):    CABGx5            POD #                       SURGEON(s): JUAREZ Mirza  SUBJECTIVE ASSESSMENT:79yFemale patient seen and examined at bedside. pt has some incisional pain, otherwise doing well.     Vital Signs Last 24 Hrs  T(F): 99.1 (03 Oct 2019 06:00), Max: 99.7 (02 Oct 2019 22:00)  HR: 90 (03 Oct 2019 07:00) (73 - 90)  BP: 134/77 (02 Oct 2019 08:34) (134/77 - 134/77)  ABP: 127/52 (03 Oct 2019 07:00) (91/47 - 148/55)  ABP(mean): 78 (03 Oct 2019 07:00)  RR: 38 (03 Oct 2019 07:00) (0 - 38)  SpO2: 98% (03 Oct 2019 07:00) (96% - 100%)  CVP(mm Hg): 12 (03 Oct 2019 07:00)  CO: 3.7 (03 Oct 2019 04:30)  CI: 2.1 (03 Oct 2019 04:30)  PA: 39/17 (03 Oct 2019 07:00)  SVR: 1598 (03 Oct 2019 04:30)  Mode: CPAP with PS  FiO2: 40  PEEP: 5  PS: 12    I&O's Detail    02 Oct 2019 07:01  -  03 Oct 2019 07:00  --------------------------------------------------------  IN:    Albumin 5%  - 500 mL: 2000 mL    dexmedetomidine Infusion: 20.8 mL    insulin regular Infusion: 33 mL    IV PiggyBack: 300 mL    niCARdipine Infusion: 235 mL    nitroglycerin  Infusion: 8 mL    Oral Fluid: 140 mL    Packed Red Blood Cells: 800 mL    sodium chloride 0.9%.: 150 mL  Total IN: 3686.8 mL    OUT:    Chest Tube: 190 mL    Chest Tube: 185 mL    Indwelling Catheter - Urethral: 1088 mL  Total OUT: 1463 mL        Net: I&O's Detail    01 Oct 2019 07:01  -  02 Oct 2019 07:00  --------------------------------------------------------  Total NET: -300 mL      02 Oct 2019 07:01  -  03 Oct 2019 07:00  --------------------------------------------------------  Total NET: 2223.8 mL        CAPILLARY BLOOD GLUCOSE  121 (03 Oct 2019 04:00)  99 (03 Oct 2019 01:00)  168 (02 Oct 2019 20:00)      POCT Blood Glucose.: 111 mg/dL (03 Oct 2019 07:04)  POCT Blood Glucose.: 70 mg/dL (03 Oct 2019 05:58)  POCT Blood Glucose.: 60 mg/dL (03 Oct 2019 05:52)  POCT Blood Glucose.: 154 mg/dL (03 Oct 2019 02:50)  POCT Blood Glucose.: 132 mg/dL (02 Oct 2019 23:13)  POCT Blood Glucose.: 117 mg/dL (02 Oct 2019 21:59)  POCT Blood Glucose.: 144 mg/dL (02 Oct 2019 21:10)  POCT Blood Glucose.: 191 mg/dL (02 Oct 2019 19:19)      Physical Exam:  General: NAD; A&Ox3  Cardiac: S1/S2, RRR, no murmur, no rubs  Lungs: decreased bs at bases  Abdomen: Soft/NT/ND; positive bowel sounds x 4  Sternum: Intact, no click, incision healing well with no drainage  Incisions: Incisions clean/dry/intact  Extremities: No edema b/l lower extremities; good capillary refill; no cyanosis; palpable 1+ pedal pulses b/l    Central Venous Catheter: Yes[x]  No[] , If Yes indication:       hd unstable             Day #1  Temple Catheter: Yes  [x] , No  [] , If yes indication:   strict i/o                              Day #1  NGT: Yes [] No [x] ,    If Yes Placement:                                                   Day #  EPICARDIAL WIRES:  [x] YES [] NO                                                            Day #1  BOWEL MOVEMENT:  [] YES [x] NO, If No, Timing since last BM Day #  CHEST TUBE(Left/Right):  [x] YES [] NO, If yes -  AIR LEAKS:  [] YES [] NO        LABS:                        10.2<L>  8.10  )-----------( 81<L>    ( 03 Oct 2019 07:20 )             30.3<L>                        6.7<LL>  6.89  )-----------( 74<L>    ( 02 Oct 2019 23:24 )             19.8<L>    10-03    142  |  106  |  12  ----------------------------<  164<H>  3.8   |  20  |  0.9  10-02    138  |  106  |  11  ----------------------------<  137<H>  4.0   |  20  |  0.8    Ca    9.1      03 Oct 2019 02:50  Mg     2.0     10    TPro  5.8<L> [6.0 - 8.0]  /  Alb  4.8 [3.5 - 5.2]  /  TBili  1.3<H> [0.2 - 1.2]  /  DBili  x   /  AST  47<H> [0 - 41]  /  ALT  22 [0 - 41]  /  AlkPhos  40 [30 - 115]  1003    PT/INR - ( 03 Oct 2019 02:50 )   PT: ;   INR: 1.75 ratio       PT/INR - ( 02 Oct 2019 23:24 )   PT: ;   INR: 1.85 ratio       PTT - ( 03 Oct 2019 02:50 )  PTT:32.9 sec, PTT - ( 02 Oct 2019 23:24 )  PTT:32.4 sec    Urinalysis Basic - ( 02 Oct 2019 00:00 )    Color: Colorless / Appearance: Clear / S.010 / pH: x  Gluc: x / Ketone: Negative  / Bili: Negative / Urobili: <2 mg/dL   Blood: x / Protein: Trace / Nitrite: Negative   Leuk Esterase: Negative / RBC: x / WBC x   Sq Epi: x / Non Sq Epi: x / Bacteria: x      ABG - ( 03 Oct 2019 02:54 )  pH: 7.34  /  pCO2: 41    /  pO2: 118   / HCO3: 22    / Base Excess: -3.5  /  SaO2: 99    /  LA: 1.2      Hemoglobin A1C, Whole Blood: 8.8      RADIOLOGY & ADDITIONAL TESTS:  CXR:  < from: Xray Chest 1 View-PORTABLE IMMEDIATE (10.02.19 @ 18:47) >  IMPRESSION:      Status post CABG.    Support devices in satisfactory position.    < end of copied text >     EKG: < from: 12 Lead ECG (10.03.19 @ 07:40) >  Ventricular Rate 70 BPM    Atrial Rate 70 BPM    P-R Interval 136 ms    QRS Duration 72 ms    Q-T Interval 360 ms    QTC Calculation(Bezet) 388 ms    P Axis 64 degrees    R Axis -5 degrees    T Axis 32 degrees    Diagnosis Line Normal sinus rhythm with sinus arrhythmia  Nonspecific T wave abnormality  Abnormal ECG    < end of copied text >      Allergies    No Known Drug Allergies  TEGADERM (Rash)    Intolerances      MEDICATIONS  (STANDING):  ALBUTerol    90 MICROgram(s) HFA Inhaler 2 Puff(s) Inhalation every 6 hours  chlorhexidine 0.12% Liquid 5 milliLiter(s) Oral Mucosa every 4 hours  chlorhexidine 0.12% Liquid 15 milliLiter(s) Oral Mucosa every 12 hours  dexmedetomidine Infusion 0.1 MICROgram(s)/kG/Hr (1.698 mL/Hr) IV Continuous <Continuous>  dextrose 50% Injectable 50 milliLiter(s) IV Push every 15 minutes  dextrose 50% Injectable 25 milliLiter(s) IV Push every 15 minutes  docusate sodium 100 milliGRAM(s) Oral three times a day  famotidine Injectable 20 milliGRAM(s) IV Push every 12 hours  fentaNYL    Injectable 25 MICROGram(s) IV Push once  insulin regular Infusion 10 Unit(s)/Hr (10 mL/Hr) IV Continuous <Continuous>  ipratropium 17 MICROgram(s) HFA Inhaler 2 Puff(s) Inhalation every 6 hours  meperidine     Injectable 25 milliGRAM(s) IV Push once  niCARdipine Infusion 5 mG/Hr (25 mL/Hr) IV Continuous <Continuous>  nitroglycerin  Infusion 30 MICROgram(s)/Min (9 mL/Hr) IV Continuous <Continuous>  norepinephrine Infusion 0.05 MICROgram(s)/kG/Min (6.366 mL/Hr) IV Continuous <Continuous>  polyethylene glycol 3350 17 Gram(s) Oral daily  propofol Infusion 30 MICROgram(s)/kG/Min (12.222 mL/Hr) IV Continuous <Continuous>  sodium chloride 0.9%. 1000 milliLiter(s) (50 mL/Hr) IV Continuous <Continuous>  sodium chloride 0.9%. 1000 milliLiter(s) (10 mL/Hr) IV Continuous <Continuous>  vasopressin Infusion 0.04 Unit(s)/Min (2.4 mL/Hr) IV Continuous <Continuous>    MEDICATIONS  (PRN):  oxyCODONE    IR 5 milliGRAM(s) Oral every 6 hours PRN Moderate Pain (4 - 6)      Pharmacologic DVT Prophylaxis: [] YES, [x]NO: Contraindication: thrombocytopenia  [] HEPARIN: Dose: XX mg  Q24H    [] LOVENOX: Dose: XX mg  Q24H                 SCD's: YES b/l    GI Prophylaxis: Protonix [], Pepcid [x]    Post-Op Beta-Blockers: [x]Yes, []No: contraindication:   Post-Op Nitrate: []Yes, [x]No: contraindication:  Post-Op Aspirin: []Yes,  [x]No: contraindication: thrombocytopenia  Post-Op Statin: []Yes, [x]No: contraindication: elevated lfts, hcx of liver transplant      Ambulation/Activity Status: ambulate     Assessment/Plan:  79y Female status-post  CABgx5 POD#1  - Case and plan discussed with CTU Intensivist and CT Surgeon - Dr. Leon/Yuki/Martin   - Continue CTU supportive care and ongoing plan of care as per continuing CTU rounds.    - Continue DVT/GI prophylaxis  - Incentive Spirometry 10 times an hour  - Continue to advance physical activity as tolerated and continue PT/OT as directed  1. CAD: Continue bb, hold asa for now as plts are low 81- trending up. hold statin for now- elevated t bili 1.3 with hx of liver transplant  2. HTN: cont bb  3. A. Fib prophylaxis: cont bb, mag 1gm bid   4. COPD/Hypoxia: cont nebs, mucinex  5. DM/Glucose Control: insulin gtt  6. hx of liver transplant: restart prograf, trend level  7. hypothyroidism; restart synthroid     Social Service Disposition:  pt to assess

## 2019-10-04 LAB
ALBUMIN SERPL ELPH-MCNC: 4.4 G/DL — SIGNIFICANT CHANGE UP (ref 3.5–5.2)
ALBUMIN SERPL ELPH-MCNC: 4.5 G/DL — SIGNIFICANT CHANGE UP (ref 3.5–5.2)
ALP SERPL-CCNC: 108 U/L — SIGNIFICANT CHANGE UP (ref 30–115)
ALP SERPL-CCNC: 66 U/L — SIGNIFICANT CHANGE UP (ref 30–115)
ALT FLD-CCNC: 12 U/L — SIGNIFICANT CHANGE UP (ref 0–41)
ALT FLD-CCNC: 16 U/L — SIGNIFICANT CHANGE UP (ref 0–41)
ANION GAP SERPL CALC-SCNC: 10 MMOL/L — SIGNIFICANT CHANGE UP (ref 7–14)
ANION GAP SERPL CALC-SCNC: 12 MMOL/L — SIGNIFICANT CHANGE UP (ref 7–14)
APTT BLD: 26.4 SEC — LOW (ref 27–39.2)
AST SERPL-CCNC: 33 U/L — SIGNIFICANT CHANGE UP (ref 0–41)
AST SERPL-CCNC: 38 U/L — SIGNIFICANT CHANGE UP (ref 0–41)
BASOPHILS # BLD AUTO: 0.04 K/UL — SIGNIFICANT CHANGE UP (ref 0–0.2)
BASOPHILS NFR BLD AUTO: 0.3 % — SIGNIFICANT CHANGE UP (ref 0–1)
BILIRUB SERPL-MCNC: 1.2 MG/DL — SIGNIFICANT CHANGE UP (ref 0.2–1.2)
BILIRUB SERPL-MCNC: 1.4 MG/DL — HIGH (ref 0.2–1.2)
BUN SERPL-MCNC: 21 MG/DL — HIGH (ref 10–20)
BUN SERPL-MCNC: 33 MG/DL — HIGH (ref 10–20)
CALCIUM SERPL-MCNC: 10 MG/DL — SIGNIFICANT CHANGE UP (ref 8.5–10.1)
CALCIUM SERPL-MCNC: 9.3 MG/DL — SIGNIFICANT CHANGE UP (ref 8.5–10.1)
CHLORIDE SERPL-SCNC: 100 MMOL/L — SIGNIFICANT CHANGE UP (ref 98–110)
CHLORIDE SERPL-SCNC: 103 MMOL/L — SIGNIFICANT CHANGE UP (ref 98–110)
CO2 SERPL-SCNC: 20 MMOL/L — SIGNIFICANT CHANGE UP (ref 17–32)
CO2 SERPL-SCNC: 20 MMOL/L — SIGNIFICANT CHANGE UP (ref 17–32)
CREAT SERPL-MCNC: 1.3 MG/DL — SIGNIFICANT CHANGE UP (ref 0.7–1.5)
CREAT SERPL-MCNC: 1.5 MG/DL — SIGNIFICANT CHANGE UP (ref 0.7–1.5)
EOSINOPHIL # BLD AUTO: 0.1 K/UL — SIGNIFICANT CHANGE UP (ref 0–0.7)
EOSINOPHIL NFR BLD AUTO: 0.8 % — SIGNIFICANT CHANGE UP (ref 0–8)
GAS PNL BLDA: SIGNIFICANT CHANGE UP
GLUCOSE BLDC GLUCOMTR-MCNC: 114 MG/DL — HIGH (ref 70–99)
GLUCOSE BLDC GLUCOMTR-MCNC: 143 MG/DL — HIGH (ref 70–99)
GLUCOSE BLDC GLUCOMTR-MCNC: 150 MG/DL — HIGH (ref 70–99)
GLUCOSE BLDC GLUCOMTR-MCNC: 157 MG/DL — HIGH (ref 70–99)
GLUCOSE BLDC GLUCOMTR-MCNC: 171 MG/DL — HIGH (ref 70–99)
GLUCOSE BLDC GLUCOMTR-MCNC: 186 MG/DL — HIGH (ref 70–99)
GLUCOSE BLDC GLUCOMTR-MCNC: 188 MG/DL — HIGH (ref 70–99)
GLUCOSE BLDC GLUCOMTR-MCNC: 91 MG/DL — SIGNIFICANT CHANGE UP (ref 70–99)
GLUCOSE SERPL-MCNC: 169 MG/DL — HIGH (ref 70–99)
GLUCOSE SERPL-MCNC: 94 MG/DL — SIGNIFICANT CHANGE UP (ref 70–99)
HCT VFR BLD CALC: 31.3 % — LOW (ref 37–47)
HGB BLD-MCNC: 10.5 G/DL — LOW (ref 12–16)
IMM GRANULOCYTES NFR BLD AUTO: 0.3 % — SIGNIFICANT CHANGE UP (ref 0.1–0.3)
INR BLD: 1.69 RATIO — HIGH (ref 0.65–1.3)
LYMPHOCYTES # BLD AUTO: 1.59 K/UL — SIGNIFICANT CHANGE UP (ref 1.2–3.4)
LYMPHOCYTES # BLD AUTO: 13.5 % — LOW (ref 20.5–51.1)
MAGNESIUM SERPL-MCNC: 2 MG/DL — SIGNIFICANT CHANGE UP (ref 1.8–2.4)
MCHC RBC-ENTMCNC: 29.5 PG — SIGNIFICANT CHANGE UP (ref 27–31)
MCHC RBC-ENTMCNC: 33.5 G/DL — SIGNIFICANT CHANGE UP (ref 32–37)
MCV RBC AUTO: 87.9 FL — SIGNIFICANT CHANGE UP (ref 81–99)
MONOCYTES # BLD AUTO: 1.23 K/UL — HIGH (ref 0.1–0.6)
MONOCYTES NFR BLD AUTO: 10.5 % — HIGH (ref 1.7–9.3)
NEUTROPHILS # BLD AUTO: 8.77 K/UL — HIGH (ref 1.4–6.5)
NEUTROPHILS NFR BLD AUTO: 74.6 % — SIGNIFICANT CHANGE UP (ref 42.2–75.2)
NRBC # BLD: 0 /100 WBCS — SIGNIFICANT CHANGE UP (ref 0–0)
PLATELET # BLD AUTO: 96 K/UL — LOW (ref 130–400)
POTASSIUM SERPL-MCNC: 4.3 MMOL/L — SIGNIFICANT CHANGE UP (ref 3.5–5)
POTASSIUM SERPL-MCNC: 4.9 MMOL/L — SIGNIFICANT CHANGE UP (ref 3.5–5)
POTASSIUM SERPL-SCNC: 4.3 MMOL/L — SIGNIFICANT CHANGE UP (ref 3.5–5)
POTASSIUM SERPL-SCNC: 4.9 MMOL/L — SIGNIFICANT CHANGE UP (ref 3.5–5)
PROT SERPL-MCNC: 5.9 G/DL — LOW (ref 6–8)
PROT SERPL-MCNC: 6 G/DL — SIGNIFICANT CHANGE UP (ref 6–8)
PROTHROM AB SERPL-ACNC: 19.3 SEC — HIGH (ref 9.95–12.87)
RBC # BLD: 3.56 M/UL — LOW (ref 4.2–5.4)
RBC # FLD: 13.8 % — SIGNIFICANT CHANGE UP (ref 11.5–14.5)
SODIUM SERPL-SCNC: 130 MMOL/L — LOW (ref 135–146)
SODIUM SERPL-SCNC: 135 MMOL/L — SIGNIFICANT CHANGE UP (ref 135–146)
TACROLIMUS SERPL-MCNC: 7.2 NG/ML — SIGNIFICANT CHANGE UP
WBC # BLD: 11.77 K/UL — HIGH (ref 4.8–10.8)
WBC # FLD AUTO: 11.77 K/UL — HIGH (ref 4.8–10.8)

## 2019-10-04 PROCEDURE — 99233 SBSQ HOSP IP/OBS HIGH 50: CPT

## 2019-10-04 PROCEDURE — 93010 ELECTROCARDIOGRAM REPORT: CPT

## 2019-10-04 PROCEDURE — 93306 TTE W/DOPPLER COMPLETE: CPT | Mod: 26

## 2019-10-04 PROCEDURE — 71045 X-RAY EXAM CHEST 1 VIEW: CPT | Mod: 26

## 2019-10-04 RX ORDER — DIGOXIN 250 MCG
0.5 TABLET ORAL ONCE
Refills: 0 | Status: COMPLETED | OUTPATIENT
Start: 2019-10-04 | End: 2019-10-04

## 2019-10-04 RX ORDER — POTASSIUM CHLORIDE 20 MEQ
20 PACKET (EA) ORAL ONCE
Refills: 0 | Status: DISCONTINUED | OUTPATIENT
Start: 2019-10-04 | End: 2019-10-04

## 2019-10-04 RX ORDER — DILTIAZEM HCL 120 MG
10 CAPSULE, EXT RELEASE 24 HR ORAL ONCE
Refills: 0 | Status: COMPLETED | OUTPATIENT
Start: 2019-10-04 | End: 2019-10-04

## 2019-10-04 RX ORDER — INSULIN LISPRO 100/ML
4 VIAL (ML) SUBCUTANEOUS
Refills: 0 | Status: DISCONTINUED | OUTPATIENT
Start: 2019-10-04 | End: 2019-10-18

## 2019-10-04 RX ORDER — FUROSEMIDE 40 MG
40 TABLET ORAL ONCE
Refills: 0 | Status: COMPLETED | OUTPATIENT
Start: 2019-10-04 | End: 2019-10-04

## 2019-10-04 RX ORDER — ASPIRIN/CALCIUM CARB/MAGNESIUM 324 MG
325 TABLET ORAL DAILY
Refills: 0 | Status: DISCONTINUED | OUTPATIENT
Start: 2019-10-04 | End: 2019-10-09

## 2019-10-04 RX ORDER — DILTIAZEM HCL 120 MG
10 CAPSULE, EXT RELEASE 24 HR ORAL
Qty: 125 | Refills: 0 | Status: DISCONTINUED | OUTPATIENT
Start: 2019-10-04 | End: 2019-10-06

## 2019-10-04 RX ORDER — FLUTICASONE PROPIONATE 50 MCG
1 SPRAY, SUSPENSION NASAL
Refills: 0 | Status: DISCONTINUED | OUTPATIENT
Start: 2019-10-04 | End: 2019-10-18

## 2019-10-04 RX ORDER — AMIODARONE HYDROCHLORIDE 400 MG/1
150 TABLET ORAL ONCE
Refills: 0 | Status: COMPLETED | OUTPATIENT
Start: 2019-10-04 | End: 2019-10-04

## 2019-10-04 RX ORDER — VASOPRESSIN 20 [USP'U]/ML
0.04 INJECTION INTRAVENOUS
Qty: 50 | Refills: 0 | Status: DISCONTINUED | OUTPATIENT
Start: 2019-10-04 | End: 2019-10-05

## 2019-10-04 RX ORDER — METOPROLOL TARTRATE 50 MG
2.5 TABLET ORAL ONCE
Refills: 0 | Status: COMPLETED | OUTPATIENT
Start: 2019-10-04 | End: 2019-10-04

## 2019-10-04 RX ORDER — INSULIN LISPRO 100/ML
VIAL (ML) SUBCUTANEOUS
Refills: 0 | Status: DISCONTINUED | OUTPATIENT
Start: 2019-10-04 | End: 2019-10-18

## 2019-10-04 RX ORDER — SODIUM CHLORIDE 9 MG/ML
1000 INJECTION, SOLUTION INTRAVENOUS
Refills: 0 | Status: DISCONTINUED | OUTPATIENT
Start: 2019-10-04 | End: 2019-10-18

## 2019-10-04 RX ORDER — HEPARIN SODIUM 5000 [USP'U]/ML
5000 INJECTION INTRAVENOUS; SUBCUTANEOUS EVERY 8 HOURS
Refills: 0 | Status: DISCONTINUED | OUTPATIENT
Start: 2019-10-04 | End: 2019-10-18

## 2019-10-04 RX ORDER — POTASSIUM CHLORIDE 20 MEQ
20 PACKET (EA) ORAL ONCE
Refills: 0 | Status: COMPLETED | OUTPATIENT
Start: 2019-10-04 | End: 2019-10-04

## 2019-10-04 RX ORDER — INSULIN GLARGINE 100 [IU]/ML
20 INJECTION, SOLUTION SUBCUTANEOUS EVERY MORNING
Refills: 0 | Status: DISCONTINUED | OUTPATIENT
Start: 2019-10-04 | End: 2019-10-18

## 2019-10-04 RX ORDER — MAGNESIUM SULFATE 500 MG/ML
1 VIAL (ML) INJECTION EVERY 12 HOURS
Refills: 0 | Status: DISCONTINUED | OUTPATIENT
Start: 2019-10-04 | End: 2019-10-17

## 2019-10-04 RX ORDER — ALBUMIN HUMAN 25 %
500 VIAL (ML) INTRAVENOUS ONCE
Refills: 0 | Status: COMPLETED | OUTPATIENT
Start: 2019-10-04 | End: 2019-10-04

## 2019-10-04 RX ORDER — DEXTROSE 50 % IN WATER 50 %
15 SYRINGE (ML) INTRAVENOUS ONCE
Refills: 0 | Status: DISCONTINUED | OUTPATIENT
Start: 2019-10-04 | End: 2019-10-18

## 2019-10-04 RX ORDER — DIPHENHYDRAMINE HCL 50 MG
25 CAPSULE ORAL EVERY 6 HOURS
Refills: 0 | Status: COMPLETED | OUTPATIENT
Start: 2019-10-04 | End: 2019-10-05

## 2019-10-04 RX ORDER — ATORVASTATIN CALCIUM 80 MG/1
10 TABLET, FILM COATED ORAL AT BEDTIME
Refills: 0 | Status: DISCONTINUED | OUTPATIENT
Start: 2019-10-04 | End: 2019-10-18

## 2019-10-04 RX ORDER — SENNA PLUS 8.6 MG/1
1 TABLET ORAL
Refills: 0 | Status: DISCONTINUED | OUTPATIENT
Start: 2019-10-04 | End: 2019-10-18

## 2019-10-04 RX ORDER — GLUCAGON INJECTION, SOLUTION 0.5 MG/.1ML
1 INJECTION, SOLUTION SUBCUTANEOUS ONCE
Refills: 0 | Status: DISCONTINUED | OUTPATIENT
Start: 2019-10-04 | End: 2019-10-18

## 2019-10-04 RX ORDER — IPRATROPIUM BROMIDE 0.2 MG/ML
500 SOLUTION, NON-ORAL INHALATION EVERY 6 HOURS
Refills: 0 | Status: DISCONTINUED | OUTPATIENT
Start: 2019-10-04 | End: 2019-10-18

## 2019-10-04 RX ADMIN — Medication 500 MICROGRAM(S): at 21:08

## 2019-10-04 RX ADMIN — ATORVASTATIN CALCIUM 10 MILLIGRAM(S): 80 TABLET, FILM COATED ORAL at 21:00

## 2019-10-04 RX ADMIN — Medication 10 MILLIGRAM(S): at 08:00

## 2019-10-04 RX ADMIN — Medication 20 MILLIEQUIVALENT(S): at 16:54

## 2019-10-04 RX ADMIN — OXYCODONE HYDROCHLORIDE 10 MILLIGRAM(S): 5 TABLET ORAL at 12:28

## 2019-10-04 RX ADMIN — Medication 12.5 MILLIGRAM(S): at 17:11

## 2019-10-04 RX ADMIN — Medication 40 MILLIGRAM(S): at 09:18

## 2019-10-04 RX ADMIN — Medication 100 GRAM(S): at 09:17

## 2019-10-04 RX ADMIN — Medication 10 MG/HR: at 09:15

## 2019-10-04 RX ADMIN — Medication 2.5 MILLIGRAM(S): at 09:16

## 2019-10-04 RX ADMIN — Medication 112 MICROGRAM(S): at 05:42

## 2019-10-04 RX ADMIN — POLYETHYLENE GLYCOL 3350 17 GRAM(S): 17 POWDER, FOR SOLUTION ORAL at 12:30

## 2019-10-04 RX ADMIN — Medication 25 MILLIGRAM(S): at 22:40

## 2019-10-04 RX ADMIN — Medication 2.5 MILLIGRAM(S): at 07:00

## 2019-10-04 RX ADMIN — Medication 40 MILLIGRAM(S): at 16:54

## 2019-10-04 RX ADMIN — OXYCODONE HYDROCHLORIDE 10 MILLIGRAM(S): 5 TABLET ORAL at 01:22

## 2019-10-04 RX ADMIN — AMIODARONE HYDROCHLORIDE 618 MILLIGRAM(S): 400 TABLET ORAL at 09:16

## 2019-10-04 RX ADMIN — VASOPRESSIN 2.4 UNIT(S)/MIN: 20 INJECTION INTRAVENOUS at 19:00

## 2019-10-04 RX ADMIN — Medication 1: at 12:30

## 2019-10-04 RX ADMIN — Medication 4 UNIT(S): at 12:30

## 2019-10-04 RX ADMIN — Medication 1 SPRAY(S): at 21:45

## 2019-10-04 RX ADMIN — HEPARIN SODIUM 5000 UNIT(S): 5000 INJECTION INTRAVENOUS; SUBCUTANEOUS at 21:00

## 2019-10-04 RX ADMIN — Medication 100 MILLIGRAM(S): at 13:31

## 2019-10-04 RX ADMIN — Medication 100 GRAM(S): at 17:11

## 2019-10-04 RX ADMIN — OXYCODONE HYDROCHLORIDE 10 MILLIGRAM(S): 5 TABLET ORAL at 00:52

## 2019-10-04 RX ADMIN — TACROLIMUS 1 MILLIGRAM(S): 5 CAPSULE ORAL at 17:11

## 2019-10-04 RX ADMIN — Medication 325 MILLIGRAM(S): at 12:30

## 2019-10-04 RX ADMIN — FAMOTIDINE 20 MILLIGRAM(S): 10 INJECTION INTRAVENOUS at 17:12

## 2019-10-04 RX ADMIN — Medication 2: at 16:53

## 2019-10-04 RX ADMIN — HEPARIN SODIUM 5000 UNIT(S): 5000 INJECTION INTRAVENOUS; SUBCUTANEOUS at 13:30

## 2019-10-04 RX ADMIN — FAMOTIDINE 20 MILLIGRAM(S): 10 INJECTION INTRAVENOUS at 05:42

## 2019-10-04 RX ADMIN — INSULIN GLARGINE 20 UNIT(S): 100 INJECTION, SOLUTION SUBCUTANEOUS at 12:28

## 2019-10-04 RX ADMIN — OXYCODONE HYDROCHLORIDE 10 MILLIGRAM(S): 5 TABLET ORAL at 13:38

## 2019-10-04 RX ADMIN — SENNA PLUS 1 TABLET(S): 8.6 TABLET ORAL at 17:11

## 2019-10-04 RX ADMIN — TACROLIMUS 1 MILLIGRAM(S): 5 CAPSULE ORAL at 05:42

## 2019-10-04 RX ADMIN — Medication 208 MILLIGRAM(S): at 10:00

## 2019-10-04 RX ADMIN — Medication 100 MILLIGRAM(S): at 05:42

## 2019-10-04 RX ADMIN — Medication 250 MILLILITER(S): at 16:54

## 2019-10-04 NOTE — PROGRESS NOTE ADULT - SUBJECTIVE AND OBJECTIVE BOX
OPERATIVE PROCEDURE(s):  cabg x 5              POD # 2    SURGEON(s): virginia    SUBJECTIVE ASSESSMENT: pt developed rapid atrial fib/flutter but is refusing to be cardioverted despite the anesthesiologist/ critical care intensivist advising her otherwise.  She converted to SR after receiving diuretic/Amio bolus and Digoxin.      Vital Signs Last 24 Hrs  T(C): 37.5 (04 Oct 2019 04:00), Max: 38 (03 Oct 2019 20:00)  T(F): 99.5 (04 Oct 2019 04:00), Max: 100.4 (03 Oct 2019 20:00)  HR: 93 (04 Oct 2019 09:00) (66 - 163)  BP: 89/55 (04 Oct 2019 08:00) (89/55 - 135/58)  BP(mean): 65 (04 Oct 2019 08:00) (65 - 86)  RR: 28 (04 Oct 2019 09:00) (18 - 33)  SpO2: 97% (04 Oct 2019 09:00) (97% - 100%)  10-03-19 @ 07:01  -  10-04-19 @ 07:00  --------------------------------------------------------  IN: 1313 mL / OUT: 486 mL / NET: 827 mL    10-04-19 @ 07:01  -  10-04-19 @ 14:14  --------------------------------------------------------  IN: 244 mL / OUT: 22 mL / NET: 222 mL    Physical Exam  General: alert and oriented x 3  Chest: pos crackles at left base  CVS: s1s2  Abd: pos bs soft nt  GI/ :  Ext: trace edema  incisions- c/d/i  sternum- intact    Central Venous Catheter: Yes[ x]  No[ ] , If Yes indication:           Day #    Temple Catheter: Yes  [ x] , No [ ] : If yes indication:                         Day #    NGT: Yes [ ] No [ x ]     If Yes Placement:                                          Day #    Post-Op Beta-Blockers: Yes [ x], No[ ], If No, then contraindication:    CHEST TUBE:  [ ] YES [x ] NO  OUTPUT:     per 24 hours    AIR LEAKS:  [ ] YES [ ] NO      EPICARDIAL WIRES:  [ x] YES [ ] NO      BOWEL MOVEMENT:  [ ] YES [x ] NO          LABS:                        10.5   11.77 )-----------( 96       ( 04 Oct 2019 01:00 )             31.3     COUMADIN:   [ ] YES [ x] NO    PT/INR - ( 04 Oct 2019 01:00 )   PT: 19.30 sec;   INR: 1.69 ratio         PTT - ( 04 Oct 2019 01:00 )  PTT:26.4 sec  10-04    135  |  103  |  21<H>  ----------------------------<  94  4.3   |  20  |  1.3    Ca    10.0      04 Oct 2019 01:00  Mg     2.0     10-04    TPro  5.9<L>  /  Alb  4.5  /  TBili  1.4<H>  /  DBili  x   /  AST  38  /  ALT  16  /  AlkPhos  66  10-04        MEDICATIONS  (STANDING):  aspirin 325 milliGRAM(s) Oral daily  atorvastatin 10 milliGRAM(s) Oral at bedtime  dextrose 5%. 1000 milliLiter(s) (50 mL/Hr) IV Continuous <Continuous>  dextrose 50% Injectable 50 milliLiter(s) IV Push every 15 minutes  dextrose 50% Injectable 25 milliLiter(s) IV Push every 15 minutes  diltiazem Infusion 10 mG/Hr (10 mL/Hr) IV Continuous <Continuous>  docusate sodium 100 milliGRAM(s) Oral three times a day  famotidine    Tablet 20 milliGRAM(s) Oral two times a day  heparin  Injectable 5000 Unit(s) SubCutaneous every 8 hours  insulin glargine Injectable (LANTUS) 20 Unit(s) SubCutaneous every morning  insulin lispro (HumaLOG) corrective regimen sliding scale   SubCutaneous three times a day before meals  insulin lispro Injectable (HumaLOG) 4 Unit(s) SubCutaneous before breakfast  ipratropium    for Nebulization 500 MICROGram(s) Nebulizer every 6 hours  levothyroxine 112 MICROGram(s) Oral daily  magnesium sulfate  IVPB 1 Gram(s) IV Intermittent every 12 hours  meperidine     Injectable 25 milliGRAM(s) IV Push once  metoprolol tartrate 12.5 milliGRAM(s) Oral two times a day  polyethylene glycol 3350 17 Gram(s) Oral daily  potassium chloride    Tablet ER 20 milliEquivalent(s) Oral once  senna 1 Tablet(s) Oral two times a day  tacrolimus 1 milliGRAM(s) Oral every 12 hours    MEDICATIONS  (PRN):  dextrose 40% Gel 15 Gram(s) Oral once PRN Blood Glucose LESS THAN 70 milliGRAM(s)/deciliter  glucagon  Injectable 1 milliGRAM(s) IntraMuscular once PRN Glucose LESS THAN 70 milligrams/deciliter  oxyCODONE    IR 10 milliGRAM(s) Oral every 4 hours PRN Severe Pain (7 - 10)  oxyCODONE    IR 5 milliGRAM(s) Oral once PRN Moderate Pain (4 - 6)      Allergies    No Known Drug Allergies  TEGADERM (Rash)    Intolerances        Ambulation/Activity Status:  ambulated well with walker      RADIOLOGY & ADDITIONAL TESTS:    EXAM:  XR CHEST PORTABLE ROUTINE 1V            PROCEDURE DATE:  10/04/2019      INTERPRETATION:  Clinical History / Reason for exam: Post cardiac surgery.    Comparison : Chest radiograph 10/3/2019.    Technique/Positioning: Single frontal portable.    Findings:    Support devices: Left-sided pacemaker. Right IJ sheath.    Cardiac/mediastinum/hilum: Post mean sternotomy and CABG. Enlarged   cardiac silhouette.    Lung parenchyma/Pleura: Increased left lower lobe opacity/effusion. Small   right pleural effusion. No pneumothorax.    Skeleton/soft tissues: Unchanged.    Impression:      Increased left lower lobe opacity/effusion. Small right pleural effusion.   No pneumothorax.    PROCEDURE DATE:  10/04/2019      INTERPRETATION:  REPORT:    TRANSTHORACIC ECHOCARDIOGRAM REPORT    Patient Name:   NEPTALI ANNA Accession #: [id number]  Medical Rec #:  [id number]     Height:      63.0 in 160.0 cm  YOB: 1940     Weight:      142.0 lb 64.41 kg  Patient Age:    79 years     BSA:         1.67 m²  Patient Gender: F            BP:          118/68 mmHg       Date of Exam:        10/4/2019 8:40:16 AM  Referring Physician: [id number]  Sonographer:         Leeann Trujillo  Reading Physician:   Aime Waters M.D.    Procedure:     2D Echo/Doppler/Color Doppler Complete.  Indications:   R07.9 - Chest Pain, unspecified  Diagnosis:     Chest pain, unspecified - R07.9  Study Details: Technically good study. Study quality was adversely   affected due                 to post-op dressings/bandaging.     Summary:   1. Normal global left ventricular systolic function.   2. Thickening of the anterior and posterior mitral valve leaflets.   3. Moderate tricuspid regurgitation.    PHYSICIAN INTERPRETATION:  Left Ventricle: The left ventricular internal cavity size is normal. Left   ventricular wall thickness is normal. Global LV systolic function was   normal. Normal segmental left ventricular systolic function.  Right Ventricle: Normal right ventricular size and function.  Left Atrium: Mild to moderately enlarged left atrium.  Right Atrium: Normal right atrial size.  Pericardium: There is no evidence of pericardial effusion.  Mitral Valve: Thickening of the anterior and posterior mitral valve   leaflets. Mild mitral valve regurgitation is seen.  Tricuspid Valve: The tricuspid valve is normal in structure. Moderate   tricuspid regurgitation is visualized.  Aortic Valve: The aortic valve is trileaflet. No evidence of aortic   stenosis. Aortic valve thickening with normal leaflet opening. No   evidence of aortic valve regurgitation is seen.  Aorta: The aortic root is normal in size and structure.  Additional Comments: A pacer wire is visualized in the right ventricle   and right atrium.       2D AND M-MODE MEASUREMENTS (normal ranges within parentheses):  Left                  Normal   Aorta/Left             Normal  Ventricle:                     Atrium:  IVSd (2D):  1.29 cm  (0.7-1.1) AoV Cusp       1.30  (1.5-2.6)  LVPWd (2D): 1.26 cm  (0.7-1.1) Separation:     cm  LVIDd (2D): 3.83 cm  (3.4-5.7) Left Atrium    3.12  (1.9-4.0)  LVIDs (2D): 2.50 cm            (Mmode):        cm  LV FS (2D):  34.8 %  (>25%)   LA Volume      43.2  Relative      0.66    (<0.42)  Index         ml/m²  Wall                           Right  Thickness                      Ventricle:                                 RVd (2D):      2.40 cm    SPECTRAL DOPPLER ANALYSIS:  LV DIASTOLIC FUNCTION:  MV Peak E: 0.90 m/s Decel Time: 88 msec  MV Peak A: 0.01 m/s  E/A Ratio: 122.95    Aortic Valve:  AoV VMax:    1.16 m/s AoV Area, Vmax: 1.66 cm² Vmax Indx: 0.99 cm²/m²  AoV Pk Grad: 5.4 mmHg    LVOT Vmax: 0.74 m/s  LVOT VTI:  0.11 m  LVOT Diam: 1.82 cm    Mitral Valve:  MV P1/2 Time: 25.63 msec  MV Area, PHT: 8.58 cm²    Tricuspid Valve and PA/RV Systolic Pressure: TR Max Velocity: 2.06 m/s RA   Pressure:  RVSP/PASP:       [id number] Aime Waters M.D., Electronically signed on 10/4/2019 at   10:59:28 AM       Assessment/Plan: Patient is a 78 yo female s/p cabg with post op a. fib now in sr  cont present tx as per ct surgeon  post op a. fib wean cardizem ; pt now in sr  diuresis  s/p liver transplant will follow daily lft's  s/p cabg- start asa and statin and cont low dose lop    Social Service Disposition:

## 2019-10-04 NOTE — PROGRESS NOTE ADULT - SUBJECTIVE AND OBJECTIVE BOX
CTU Attending Progress Daily Note     04 Oct 2019 11:32  POD# - 2  He has history of Myocardial infarction  HTN (hypertension)  SOB (shortness of breath)  Hypothyroid  Liver transplant status  Diabetes    Interval event for past 24 hr:  NEPTALI ANNA  79y had no event.   Current Complains:  NEPTALI ANNA has no new complains  New A fib this am  HPI:    OBJECTIVE:  ICU Vital Signs Last 24 Hrs  T(C): 37.5 (04 Oct 2019 04:00), Max: 38 (03 Oct 2019 20:00)  T(F): 99.5 (04 Oct 2019 04:00), Max: 100.4 (03 Oct 2019 20:00)  HR: 93 (04 Oct 2019 09:00) (66 - 163)  BP: 89/55 (04 Oct 2019 08:00) (89/55 - 135/58)  BP(mean): 65 (04 Oct 2019 08:00) (65 - 86)  ABP: 116/60 (04 Oct 2019 09:00) (92/47 - 154/56)  ABP(mean): 78 (04 Oct 2019 09:00) (61 - 92)  RR: 28 (04 Oct 2019 09:00) (18 - 33)  SpO2: 97% (04 Oct 2019 09:00) (97% - 100%)    I&O's Summary    03 Oct 2019 07:01  -  04 Oct 2019 07:00  --------------------------------------------------------  IN: 1313 mL / OUT: 486 mL / NET: 827 mL    04 Oct 2019 07:01  -  04 Oct 2019 11:32  --------------------------------------------------------  IN: 244 mL / OUT: 22 mL / NET: 222 mL      I&O's Detail    03 Oct 2019 07:01  -  04 Oct 2019 07:00  --------------------------------------------------------  IN:    insulin regular Infusion: 38 mL    niCARdipine Infusion: 45 mL    Oral Fluid: 1080 mL    sodium chloride 0.9%: 150 mL  Total IN: 1313 mL    OUT:    Chest Tube: 110 mL    Chest Tube: 30 mL    Indwelling Catheter - Urethral: 346 mL  Total OUT: 486 mL    Total NET: 827 mL      04 Oct 2019 07:01  -  04 Oct 2019 11:32  --------------------------------------------------------  IN:    diltiazem Infusion: 30 mL    insulin regular Infusion: 4 mL    IV PiggyBack: 200 mL    sodium chloride 0.9%: 10 mL  Total IN: 244 mL    OUT:    Indwelling Catheter - Urethral: 22 mL  Total OUT: 22 mL    Total NET: 222 mL        Adult Advanced Hemodynamics Last 24 Hrs      CAPILLARY BLOOD GLUCOSE  126 (04 Oct 2019 03:30)  110 (03 Oct 2019 18:00)  72 (03 Oct 2019 16:00)  163 (03 Oct 2019 13:00)      POCT Blood Glucose.: 157 mg/dL (04 Oct 2019 11:26)  POCT Blood Glucose.: 143 mg/dL (04 Oct 2019 06:14)  POCT Blood Glucose.: 91 mg/dL (04 Oct 2019 00:39)  POCT Blood Glucose.: 122 mg/dL (03 Oct 2019 22:40)  POCT Blood Glucose.: 144 mg/dL (03 Oct 2019 19:54)  POCT Blood Glucose.: 110 mg/dL (03 Oct 2019 18:05)  POCT Blood Glucose.: 72 mg/dL (03 Oct 2019 16:45)  POCT Blood Glucose.: 163 mg/dL (03 Oct 2019 13:26)    LABS:  ABG - ( 04 Oct 2019 02:57 )  pH, Arterial: 7.28  pH, Blood: x     /  pCO2: 42    /  pO2: 101   / HCO3: 20    / Base Excess: -6.8  /  SaO2: 98                                      10.5   11.77 )-----------( 96       ( 04 Oct 2019 01:00 )             31.3     10-04    135  |  103  |  21<H>  ----------------------------<  94  4.3   |  20  |  1.3    Ca    10.0      04 Oct 2019 01:00  Mg     2.0     10-04    TPro  5.9<L>  /  Alb  4.5  /  TBili  1.4<H>  /  DBili  x   /  AST  38  /  ALT  16  /  AlkPhos  66  10-04    PT/INR - ( 04 Oct 2019 01:00 )   PT: 19.30 sec;   INR: 1.69 ratio         PTT - ( 04 Oct 2019 01:00 )  PTT:26.4 sec      Home Medications:  Ambien 5 mg oral tablet: 1 tab(s) orally once a day (at bedtime) (24 Sep 2019 10:23)  aspirin 81 mg oral tablet: 1 tab(s) orally once a day (24 Sep 2019 10:23)  insulin aspart: 4 UNITS subcutaneous 3 times a day (before meals) BASED ON BLOOD SUGAR (24 Sep 2019 10:23)  Lantus: 18 unit(s) subcutaneous once a day (at bedtime) (24 Sep 2019 10:23)  Lopressor 50 mg oral tablet: 1 tab(s) orally 2 times a day (24 Sep 2019 10:23)  NIFEdipine 90 mg oral tablet, extended release: 1 tab(s) orally once a day (24 Sep 2019 10:23)  Prograf 1 mg oral capsule: 1 cap(s) orally every 12 hours (24 Sep 2019 10:23)  Synthroid 112 mcg (0.112 mg) oral tablet: 1 tab(s) orally once a day (24 Sep 2019 10:23)    HOSPITAL MEDICATIONS:  MEDICATIONS  (STANDING):  aspirin 325 milliGRAM(s) Oral daily  atorvastatin 10 milliGRAM(s) Oral at bedtime  dextrose 5%. 1000 milliLiter(s) (50 mL/Hr) IV Continuous <Continuous>  dextrose 50% Injectable 50 milliLiter(s) IV Push every 15 minutes  dextrose 50% Injectable 25 milliLiter(s) IV Push every 15 minutes  digoxin  IVPB 0.5 milliGRAM(s) IV Intermittent once  diltiazem Infusion 10 mG/Hr (10 mL/Hr) IV Continuous <Continuous>  docusate sodium 100 milliGRAM(s) Oral three times a day  famotidine    Tablet 20 milliGRAM(s) Oral two times a day  heparin  Injectable 5000 Unit(s) SubCutaneous every 8 hours  insulin glargine Injectable (LANTUS) 20 Unit(s) SubCutaneous every morning  insulin lispro (HumaLOG) corrective regimen sliding scale   SubCutaneous three times a day before meals  insulin lispro Injectable (HumaLOG) 4 Unit(s) SubCutaneous before breakfast  ipratropium    for Nebulization 500 MICROGram(s) Nebulizer every 6 hours  levothyroxine 112 MICROGram(s) Oral daily  magnesium sulfate  IVPB 1 Gram(s) IV Intermittent every 12 hours  meperidine     Injectable 25 milliGRAM(s) IV Push once  metoprolol tartrate 12.5 milliGRAM(s) Oral two times a day  polyethylene glycol 3350 17 Gram(s) Oral daily  potassium chloride    Tablet ER 20 milliEquivalent(s) Oral once  senna 1 Tablet(s) Oral two times a day  tacrolimus 1 milliGRAM(s) Oral every 12 hours    MEDICATIONS  (PRN):  dextrose 40% Gel 15 Gram(s) Oral once PRN Blood Glucose LESS THAN 70 milliGRAM(s)/deciliter  glucagon  Injectable 1 milliGRAM(s) IntraMuscular once PRN Glucose LESS THAN 70 milligrams/deciliter  oxyCODONE    IR 10 milliGRAM(s) Oral every 4 hours PRN Severe Pain (7 - 10)  oxyCODONE    IR 5 milliGRAM(s) Oral once PRN Moderate Pain (4 - 6)      REVIEW OF SYSTEMS:  CONSTITUTIONAL: [X] all negative; [ ] weakness, [ ] fevers, [ ] chills  EYES/ENT: [X] all negative; [ ] visual changes, [ ] vertigo, [ ] throat pain   NECK: [X] all negative; [ ] pain, [ ] stiffness  RESPIRATORY: [] all negative, [ ] cough, [ ] wheezing, [ ] hemoptysis, [ ] shortness of breath  CARDIOVASCULAR: [] all negative; [ ] chest pain, [ ] palpitations, [ ] orthopnea  GASTROINTESTINAL: [X] all negative; [ ]abdominal pain, [ ] nausea, [ ] vomiting, [ ] hematemesis, [ ] diarrhea, [ ] constipation, [ ] melena, [ ] hematochezia.  GENITOURINARY: [X] all negative; [ ] dysuria, [ ] frequency, [ ] hematuria  NEUROLOGICAL: [X] all negative; [ ] numbness, [ ] weakness  SKIN: [X] all negative; [ ] itching, [ ] burning, [ ] rashes, [ ] lesions   All other review of systems is negative unless indicated above.    [  ] Unable to assess ROS because     PHYSICAL EXAM:          CONSTITUTIONAL: Well-developed; well-nourished; in no acute distress.   	SKIN: warm, dry  	HEAD: Normocephalic; atraumatic.  	EYES: PERRL, EOM, no conj injection, sclera clear  	ENT: No nasal discharge; airway clear.  	NECK: Supple; non tender.  No midline ttp ctls  	CARD: S1, S2 normal; no murmurs, gallops, or rubs. Regular rate and rhythm. 2+ RPs and DPs bilat, no carotid bruits, no pedal   edema, no calf pain b/l  	RESP: CTA  bilat good air movement No wheezes, rales or rhonchi.  	ABD: Soft, not tender, not distended, no CVA ttp no rebound or guarding, bowel sounds present  	EXT: Normal ROM.  No clubbing, cyanosis or edema.   	  	NEURO: Alert, awake, motor 5/5 R, 5/5 L      EKG  A fib fast RVR   RADIOLOGY:  xray  mild pulm congestion

## 2019-10-05 LAB
ALBUMIN SERPL ELPH-MCNC: 4.2 G/DL — SIGNIFICANT CHANGE UP (ref 3.5–5.2)
ALP SERPL-CCNC: 109 U/L — SIGNIFICANT CHANGE UP (ref 30–115)
ALT FLD-CCNC: 13 U/L — SIGNIFICANT CHANGE UP (ref 0–41)
ANION GAP SERPL CALC-SCNC: 11 MMOL/L — SIGNIFICANT CHANGE UP (ref 7–14)
ANION GAP SERPL CALC-SCNC: 14 MMOL/L — SIGNIFICANT CHANGE UP (ref 7–14)
AST SERPL-CCNC: 26 U/L — SIGNIFICANT CHANGE UP (ref 0–41)
BASE EXCESS BLDA CALC-SCNC: -6.2 MMOL/L — LOW (ref -2–2)
BASOPHILS # BLD AUTO: 0.02 K/UL — SIGNIFICANT CHANGE UP (ref 0–0.2)
BASOPHILS NFR BLD AUTO: 0.2 % — SIGNIFICANT CHANGE UP (ref 0–1)
BILIRUB SERPL-MCNC: 1.2 MG/DL — SIGNIFICANT CHANGE UP (ref 0.2–1.2)
BUN SERPL-MCNC: 39 MG/DL — HIGH (ref 10–20)
BUN SERPL-MCNC: 43 MG/DL — HIGH (ref 10–20)
CALCIUM SERPL-MCNC: 9.5 MG/DL — SIGNIFICANT CHANGE UP (ref 8.5–10.1)
CALCIUM SERPL-MCNC: 9.5 MG/DL — SIGNIFICANT CHANGE UP (ref 8.5–10.1)
CHLORIDE SERPL-SCNC: 97 MMOL/L — LOW (ref 98–110)
CHLORIDE SERPL-SCNC: 99 MMOL/L — SIGNIFICANT CHANGE UP (ref 98–110)
CO2 SERPL-SCNC: 18 MMOL/L — SIGNIFICANT CHANGE UP (ref 17–32)
CO2 SERPL-SCNC: 22 MMOL/L — SIGNIFICANT CHANGE UP (ref 17–32)
CREAT SERPL-MCNC: 1.5 MG/DL — SIGNIFICANT CHANGE UP (ref 0.7–1.5)
CREAT SERPL-MCNC: 1.5 MG/DL — SIGNIFICANT CHANGE UP (ref 0.7–1.5)
EOSINOPHIL # BLD AUTO: 0.13 K/UL — SIGNIFICANT CHANGE UP (ref 0–0.7)
EOSINOPHIL NFR BLD AUTO: 1.1 % — SIGNIFICANT CHANGE UP (ref 0–8)
GLUCOSE BLDC GLUCOMTR-MCNC: 164 MG/DL — HIGH (ref 70–99)
GLUCOSE BLDC GLUCOMTR-MCNC: 179 MG/DL — HIGH (ref 70–99)
GLUCOSE BLDC GLUCOMTR-MCNC: 193 MG/DL — HIGH (ref 70–99)
GLUCOSE BLDC GLUCOMTR-MCNC: 95 MG/DL — SIGNIFICANT CHANGE UP (ref 70–99)
GLUCOSE SERPL-MCNC: 165 MG/DL — HIGH (ref 70–99)
GLUCOSE SERPL-MCNC: 219 MG/DL — HIGH (ref 70–99)
HCO3 BLDA-SCNC: 20 MMOL/L — LOW (ref 23–27)
HCT VFR BLD CALC: 30.6 % — LOW (ref 37–47)
HGB BLD-MCNC: 10.2 G/DL — LOW (ref 12–16)
IMM GRANULOCYTES NFR BLD AUTO: 0.7 % — HIGH (ref 0.1–0.3)
INR BLD: 1.55 RATIO — HIGH (ref 0.65–1.3)
LYMPHOCYTES # BLD AUTO: 1.27 K/UL — SIGNIFICANT CHANGE UP (ref 1.2–3.4)
LYMPHOCYTES # BLD AUTO: 11.2 % — LOW (ref 20.5–51.1)
MAGNESIUM SERPL-MCNC: 2.4 MG/DL — SIGNIFICANT CHANGE UP (ref 1.8–2.4)
MCHC RBC-ENTMCNC: 29.7 PG — SIGNIFICANT CHANGE UP (ref 27–31)
MCHC RBC-ENTMCNC: 33.3 G/DL — SIGNIFICANT CHANGE UP (ref 32–37)
MCV RBC AUTO: 89.2 FL — SIGNIFICANT CHANGE UP (ref 81–99)
MONOCYTES # BLD AUTO: 1.12 K/UL — HIGH (ref 0.1–0.6)
MONOCYTES NFR BLD AUTO: 9.9 % — HIGH (ref 1.7–9.3)
NEUTROPHILS # BLD AUTO: 8.74 K/UL — HIGH (ref 1.4–6.5)
NEUTROPHILS NFR BLD AUTO: 76.9 % — HIGH (ref 42.2–75.2)
NRBC # BLD: 0 /100 WBCS — SIGNIFICANT CHANGE UP (ref 0–0)
PCO2 BLDA: 43 MMHG — HIGH (ref 38–42)
PH BLDA: 7.28 — LOW (ref 7.38–7.42)
PLATELET # BLD AUTO: 104 K/UL — LOW (ref 130–400)
PO2 BLDA: 114 MMHG — HIGH (ref 78–95)
POTASSIUM SERPL-MCNC: 4.6 MMOL/L — SIGNIFICANT CHANGE UP (ref 3.5–5)
POTASSIUM SERPL-MCNC: 5 MMOL/L — SIGNIFICANT CHANGE UP (ref 3.5–5)
POTASSIUM SERPL-SCNC: 4.6 MMOL/L — SIGNIFICANT CHANGE UP (ref 3.5–5)
POTASSIUM SERPL-SCNC: 5 MMOL/L — SIGNIFICANT CHANGE UP (ref 3.5–5)
PROT SERPL-MCNC: 5.8 G/DL — LOW (ref 6–8)
PROTHROM AB SERPL-ACNC: 17.8 SEC — HIGH (ref 9.95–12.87)
RBC # BLD: 3.43 M/UL — LOW (ref 4.2–5.4)
RBC # FLD: 13.9 % — SIGNIFICANT CHANGE UP (ref 11.5–14.5)
SAO2 % BLDA: 99 % — HIGH (ref 94–98)
SODIUM SERPL-SCNC: 130 MMOL/L — LOW (ref 135–146)
SODIUM SERPL-SCNC: 131 MMOL/L — LOW (ref 135–146)
TACROLIMUS SERPL-MCNC: 13.2 NG/ML — SIGNIFICANT CHANGE UP
WBC # BLD: 11.36 K/UL — HIGH (ref 4.8–10.8)
WBC # FLD AUTO: 11.36 K/UL — HIGH (ref 4.8–10.8)

## 2019-10-05 PROCEDURE — 99233 SBSQ HOSP IP/OBS HIGH 50: CPT

## 2019-10-05 PROCEDURE — 99232 SBSQ HOSP IP/OBS MODERATE 35: CPT

## 2019-10-05 PROCEDURE — 93010 ELECTROCARDIOGRAM REPORT: CPT

## 2019-10-05 PROCEDURE — 71045 X-RAY EXAM CHEST 1 VIEW: CPT | Mod: 26

## 2019-10-05 RX ORDER — METOPROLOL TARTRATE 50 MG
25 TABLET ORAL
Refills: 0 | Status: DISCONTINUED | OUTPATIENT
Start: 2019-10-05 | End: 2019-10-18

## 2019-10-05 RX ORDER — BUMETANIDE 0.25 MG/ML
1 INJECTION INTRAMUSCULAR; INTRAVENOUS ONCE
Refills: 0 | Status: COMPLETED | OUTPATIENT
Start: 2019-10-05 | End: 2019-10-05

## 2019-10-05 RX ORDER — TACROLIMUS 5 MG/1
0.5 CAPSULE ORAL EVERY 12 HOURS
Refills: 0 | Status: DISCONTINUED | OUTPATIENT
Start: 2019-10-05 | End: 2019-10-09

## 2019-10-05 RX ORDER — DIPHENHYDRAMINE HCL 50 MG
25 CAPSULE ORAL ONCE
Refills: 0 | Status: COMPLETED | OUTPATIENT
Start: 2019-10-05 | End: 2019-10-05

## 2019-10-05 RX ADMIN — Medication 1 SPRAY(S): at 05:39

## 2019-10-05 RX ADMIN — Medication 25 MILLIGRAM(S): at 17:41

## 2019-10-05 RX ADMIN — ATORVASTATIN CALCIUM 10 MILLIGRAM(S): 80 TABLET, FILM COATED ORAL at 21:39

## 2019-10-05 RX ADMIN — HEPARIN SODIUM 5000 UNIT(S): 5000 INJECTION INTRAVENOUS; SUBCUTANEOUS at 05:38

## 2019-10-05 RX ADMIN — HEPARIN SODIUM 5000 UNIT(S): 5000 INJECTION INTRAVENOUS; SUBCUTANEOUS at 21:40

## 2019-10-05 RX ADMIN — INSULIN GLARGINE 20 UNIT(S): 100 INJECTION, SOLUTION SUBCUTANEOUS at 13:05

## 2019-10-05 RX ADMIN — FAMOTIDINE 20 MILLIGRAM(S): 10 INJECTION INTRAVENOUS at 17:41

## 2019-10-05 RX ADMIN — TACROLIMUS 1 MILLIGRAM(S): 5 CAPSULE ORAL at 17:41

## 2019-10-05 RX ADMIN — FAMOTIDINE 20 MILLIGRAM(S): 10 INJECTION INTRAVENOUS at 05:38

## 2019-10-05 RX ADMIN — TACROLIMUS 1 MILLIGRAM(S): 5 CAPSULE ORAL at 05:38

## 2019-10-05 RX ADMIN — Medication 1: at 12:00

## 2019-10-05 RX ADMIN — Medication 100 GRAM(S): at 18:30

## 2019-10-05 RX ADMIN — Medication 112 MICROGRAM(S): at 05:38

## 2019-10-05 RX ADMIN — Medication 12.5 MILLIGRAM(S): at 05:39

## 2019-10-05 RX ADMIN — Medication 100 GRAM(S): at 05:38

## 2019-10-05 RX ADMIN — OXYCODONE HYDROCHLORIDE 5 MILLIGRAM(S): 5 TABLET ORAL at 11:22

## 2019-10-05 RX ADMIN — Medication 325 MILLIGRAM(S): at 11:23

## 2019-10-05 RX ADMIN — Medication 25 MILLIGRAM(S): at 05:40

## 2019-10-05 RX ADMIN — Medication 25 MILLIGRAM(S): at 01:00

## 2019-10-05 RX ADMIN — Medication 1: at 07:40

## 2019-10-05 RX ADMIN — HEPARIN SODIUM 5000 UNIT(S): 5000 INJECTION INTRAVENOUS; SUBCUTANEOUS at 13:29

## 2019-10-05 RX ADMIN — Medication 1 SPRAY(S): at 17:41

## 2019-10-05 RX ADMIN — BUMETANIDE 1 MILLIGRAM(S): 0.25 INJECTION INTRAMUSCULAR; INTRAVENOUS at 21:37

## 2019-10-05 RX ADMIN — Medication 500 MICROGRAM(S): at 13:00

## 2019-10-05 RX ADMIN — Medication 1: at 16:00

## 2019-10-05 RX ADMIN — OXYCODONE HYDROCHLORIDE 10 MILLIGRAM(S): 5 TABLET ORAL at 23:00

## 2019-10-05 RX ADMIN — Medication 4 UNIT(S): at 07:46

## 2019-10-05 RX ADMIN — OXYCODONE HYDROCHLORIDE 10 MILLIGRAM(S): 5 TABLET ORAL at 23:30

## 2019-10-05 NOTE — PROGRESS NOTE ADULT - SUBJECTIVE AND OBJECTIVE BOX
OPERATIVE PROCEDURE(s):  cabg x 5               POD # 3     SURGEON(s): virginia    SUBJECTIVE ASSESSMENT: pt is without complaints and is now back in SR after she refused cardioversion yesterday    Vital Signs Last 24 Hrs  T(C): 37.2 (05 Oct 2019 08:00), Max: 37.7 (04 Oct 2019 16:00)  T(F): 99 (05 Oct 2019 08:00), Max: 99.8 (04 Oct 2019 16:00)  HR: 63 (05 Oct 2019 12:00) (60 - 88)  BP: 134/64 (05 Oct 2019 12:00) (128/60 - 145/65)  BP(mean): 92 (05 Oct 2019 12:00) (87 - 93)  RR: 22 (05 Oct 2019 12:00) (17 - 43)  SpO2: 100% (05 Oct 2019 12:00) (97% - 100%)  10-04-19 @ 07:01  -  10-05-19 @ 07:00  --------------------------------------------------------  IN: 1765.2 mL / OUT: 556 mL / NET: 1209.2 mL    10-05-19 @ 07:01  -  10-05-19 @ 13:11  --------------------------------------------------------  IN: 75 mL / OUT: 19 mL / NET: 56 mL    Physical Exam  General: alert and oriented x 3  Chest: pos crackles at left base  CVS: s1s2  Abd: pos bs soft nt  GI/ :  Ext: trace edema  incisions- c/d/i  sternum- intact    Central Venous Catheter: Yes[ x]  No[ ] , If Yes indication:           Day #    Temple Catheter: Yes  [ x] , No [ ] : If yes indication:                         Day #    NGT: Yes [ ] No [ x ]     If Yes Placement:                                          Day #    Post-Op Beta-Blockers: Yes [ x], No[ ], If No, then contraindication:    CHEST TUBE:  [ ] YES [x ] NO  OUTPUT:     per 24 hours    AIR LEAKS:  [ ] YES [ ] NO      EPICARDIAL WIRES:  [ x] YES [ ] NO      BOWEL MOVEMENT:  [x ] YES [ ] NO        LABS:                        10.2   11.36 )-----------( 104      ( 05 Oct 2019 02:00 )             30.6     COUMADIN:   [ ] YES [x ] NO    PT/INR - ( 05 Oct 2019 02:00 )   PT: 17.80 sec;   INR: 1.55 ratio         PTT - ( 04 Oct 2019 01:00 )  PTT:26.4 sec  10-05    131<L>  |  99  |  39<H>  ----------------------------<  165<H>  5.0   |  18  |  1.5    Ca    9.5      05 Oct 2019 02:00  Mg     2.4     10-05    TPro  5.8<L>  /  Alb  4.2  /  TBili  1.2  /  DBili  x   /  AST  26  /  ALT  13  /  AlkPhos  109  10-05        MEDICATIONS  (STANDING):  aspirin 325 milliGRAM(s) Oral daily  atorvastatin 10 milliGRAM(s) Oral at bedtime  dextrose 5%. 1000 milliLiter(s) (50 mL/Hr) IV Continuous <Continuous>  dextrose 50% Injectable 50 milliLiter(s) IV Push every 15 minutes  dextrose 50% Injectable 25 milliLiter(s) IV Push every 15 minutes  diltiazem Infusion 10 mG/Hr (10 mL/Hr) IV Continuous <Continuous>  docusate sodium 100 milliGRAM(s) Oral three times a day  famotidine    Tablet 20 milliGRAM(s) Oral two times a day  fluticasone propionate 50 MICROgram(s)/spray Nasal Spray 1 Spray(s) Both Nostrils two times a day  heparin  Injectable 5000 Unit(s) SubCutaneous every 8 hours  insulin glargine Injectable (LANTUS) 20 Unit(s) SubCutaneous every morning  insulin lispro (HumaLOG) corrective regimen sliding scale   SubCutaneous three times a day before meals  insulin lispro Injectable (HumaLOG) 4 Unit(s) SubCutaneous before breakfast  ipratropium    for Nebulization 500 MICROGram(s) Nebulizer every 6 hours  levothyroxine 112 MICROGram(s) Oral daily  magnesium sulfate  IVPB 1 Gram(s) IV Intermittent every 12 hours  meperidine     Injectable 25 milliGRAM(s) IV Push once  metoprolol tartrate 25 milliGRAM(s) Oral two times a day  polyethylene glycol 3350 17 Gram(s) Oral daily  senna 1 Tablet(s) Oral two times a day  tacrolimus 1 milliGRAM(s) Oral every 12 hours    MEDICATIONS  (PRN):  dextrose 40% Gel 15 Gram(s) Oral once PRN Blood Glucose LESS THAN 70 milliGRAM(s)/deciliter  glucagon  Injectable 1 milliGRAM(s) IntraMuscular once PRN Glucose LESS THAN 70 milligrams/deciliter  oxyCODONE    IR 10 milliGRAM(s) Oral every 4 hours PRN Severe Pain (7 - 10)      Allergies    No Known Drug Allergies  TEGADERM (Rash)    Intolerances        Ambulation/Activity Status:  ambulates with walker      RADIOLOGY & ADDITIONAL TESTS:  Diet, DASH/TLC:   Sodium & Cholesterol Restricted  Supplement Feeding Modality:  Oral  Glucerna Shake Cans or Servings Per Day:  3       Frequency:  Three Times a day (10-04-19 @ 15:41)  Xray Chest 1 View- PORTABLE-Routine: AM   Indication: Shortness of Breath  Transport: Portable,  w/ Monitor  Provider's Contact #: [phone] (10-05-19 @ 09:49)  EXAM:  XR CHEST PORTABLE ROUTINE 1V            PROCEDURE DATE:  10/04/2019      INTERPRETATION:  Clinical History / Reason for exam: Post cardiac surgery.    Comparison : Chest radiograph 10/3/2019.    Technique/Positioning: Single frontal portable.    Findings:    Support devices: Left-sided pacemaker. Right IJ sheath.    Cardiac/mediastinum/hilum: Post mean sternotomy and CABG. Enlarged   cardiac silhouette.    Lung parenchyma/Pleura: Increased left lower lobe opacity/effusion. Small   right pleural effusion. No pneumothorax.    Skeleton/soft tissues: Unchanged.    Impression:      Increased left lower lobe opacity/effusion. Small right pleural effusion.   No pneumothorax.    Assessment/Plan: Patient is a 80 yo female s/p cabg pod # 3 with post op a. fib now in sr  cont present tx as per ct surgeon  post op a. fib d/c cardizem ; pt now in sr  diuresis  s/p liver transplant will follow daily lft's and consult gi  s/p cabg- start asa and statin and cont low dose lop  renal consult being pt has jelena and is on tacrolimus for transplant  d/c cordis  repeat bmp to check potassium and creat  Social Service Disposition:  home vs snf early next week

## 2019-10-05 NOTE — PROGRESS NOTE ADULT - SUBJECTIVE AND OBJECTIVE BOX
Gastroenterology progress note:     Patient is a 79y old  Female who presents with a chief complaint of cad (05 Oct 2019 13:10)       Admitted on: 10-01-19    We are following the patient for: liver transplant history 10 years ago , patient underwent CABG  3 days ago      Interval History: patient developed AUDRA post operative , patient denies any abdominal pain, no nausea or vomiting    Patient's medical problems are  stable    Prior records reviewed (Y/N):  History obtained from someone other than patient (Y/N):      PAST MEDICAL & SURGICAL HISTORY:  Myocardial infarction  HTN (hypertension)  SOB (shortness of breath)  Hypothyroid  Liver transplant status  Diabetes  H/O heart artery stent: X5  Pacemaker: medtronic- last interogated 1 m ago  Liver transplanted      MEDICATIONS  (STANDING):  aspirin 325 milliGRAM(s) Oral daily  atorvastatin 10 milliGRAM(s) Oral at bedtime  dextrose 5%. 1000 milliLiter(s) (50 mL/Hr) IV Continuous <Continuous>  dextrose 50% Injectable 50 milliLiter(s) IV Push every 15 minutes  dextrose 50% Injectable 25 milliLiter(s) IV Push every 15 minutes  diltiazem Infusion 10 mG/Hr (10 mL/Hr) IV Continuous <Continuous>  docusate sodium 100 milliGRAM(s) Oral three times a day  famotidine    Tablet 20 milliGRAM(s) Oral two times a day  fluticasone propionate 50 MICROgram(s)/spray Nasal Spray 1 Spray(s) Both Nostrils two times a day  heparin  Injectable 5000 Unit(s) SubCutaneous every 8 hours  insulin glargine Injectable (LANTUS) 20 Unit(s) SubCutaneous every morning  insulin lispro (HumaLOG) corrective regimen sliding scale   SubCutaneous three times a day before meals  insulin lispro Injectable (HumaLOG) 4 Unit(s) SubCutaneous before breakfast  ipratropium    for Nebulization 500 MICROGram(s) Nebulizer every 6 hours  levothyroxine 112 MICROGram(s) Oral daily  magnesium sulfate  IVPB 1 Gram(s) IV Intermittent every 12 hours  meperidine     Injectable 25 milliGRAM(s) IV Push once  metoprolol tartrate 25 milliGRAM(s) Oral two times a day  polyethylene glycol 3350 17 Gram(s) Oral daily  senna 1 Tablet(s) Oral two times a day  tacrolimus 1 milliGRAM(s) Oral every 12 hours    MEDICATIONS  (PRN):  dextrose 40% Gel 15 Gram(s) Oral once PRN Blood Glucose LESS THAN 70 milliGRAM(s)/deciliter  glucagon  Injectable 1 milliGRAM(s) IntraMuscular once PRN Glucose LESS THAN 70 milligrams/deciliter  oxyCODONE    IR 10 milliGRAM(s) Oral every 4 hours PRN Severe Pain (7 - 10)      Allergies  No Known Drug Allergies  TEGADERM (Rash)      Review of Systems:   Cardiovascular:  No Chest Pain, No Palpitations  Respiratory:  No Cough, No Dyspnea  Gastrointestinal:  As described in HPI    Physical Examination:  T(C): 37 (10-05-19 @ 12:00), Max: 37.7 (10-05-19 @ 04:00)  HR: 60 (10-05-19 @ 15:00) (60 - 88)  BP: 118/59 (10-05-19 @ 15:00) (109/54 - 145/65)  RR: 19 (10-05-19 @ 15:00) (17 - 43)  SpO2: 100% (10-05-19 @ 15:00) (99% - 100%)      10-04-19 @ 07:01  -  10-05-19 @ 07:00  --------------------------------------------------------  IN: 1765.2 mL / OUT: 556 mL / NET: 1209.2 mL    10-05-19 @ 07:01  -  10-05-19 @ 18:04  --------------------------------------------------------  IN: 175 mL / OUT: 141 mL / NET: 34 mL      Constitutional: No acute distress.  Respiratory:  No signs of respiratory distress. Lung sounds are clear bilaterally.  Cardiovascular:  S1 S2, Regular rate and rhythm.  Abdominal: Abdomen is soft, symmetric, and non-tender without distention. There are no visible lesions or scars. Bowel sounds are present and normoactive in all four quadrants. No masses, hepatomegaly, or splenomegaly are noted.   Skin: No rashes, No Jaundice.        Data: (reviewed by attending)                        10.2   11.36 )-----------( 104      ( 05 Oct 2019 02:00 )             30.6     Hgb trend:  10.2  10-05-19 @ 02:00  10.5  10-04-19 @ 01:00  10.0  10-03-19 @ 17:30  10.2  10-03-19 @ 07:20  6.7  10-02-19 @ 23:24      10-02-19 @ 07:01  -  10-03-19 @ 07:00  --------------------------------------------------------  IN: 2800 mL    10-04-19 @ 07:01  -  10-05-19 @ 07:00  --------------------------------------------------------  IN: 500 mL      10-05    130<L>  |  97<L>  |  43<H>  ----------------------------<  219<H>  4.6   |  22  |  1.5    Ca    9.5      05 Oct 2019 12:50  Mg     2.4     10-05    TPro  5.8<L>  /  Alb  4.2  /  TBili  1.2  /  DBili  x   /  AST  26  /  ALT  13  /  AlkPhos  109  10-05    Liver panel trend:  TBili 1.2   /   AST 26   /   ALT 13   /   AlkP 109   /   Tptn 5.8   /   Alb 4.2    /   DBili --      10-05  TBili 1.2   /   AST 33   /   ALT 12   /   AlkP 108   /   Tptn 6.0   /   Alb 4.4    /   DBili --      10-04  TBili 1.4   /   AST 38   /   ALT 16   /   AlkP 66   /   Tptn 5.9   /   Alb 4.5    /   DBili --      10-04  TBili 1.3   /   AST 47   /   ALT 22   /   AlkP 40   /   Tptn 5.8   /   Alb 4.8    /   DBili --      10-03  TBili 1.2   /      /   ALT 38   /   AlkP 56   /   Tptn 5.3   /   Alb 3.9    /   DBili --      10-02  TBili 0.7   /   AST 40   /   ALT 17   /   AlkP 82   /   Tptn 7.1   /   Alb 4.0    /   DBili --      10-02  TBili 0.3   /   AST 19   /   ALT 12   /   AlkP 85   /   Tptn 6.5   /   Alb 3.8    /   DBili --      10-01      PT/INR - ( 05 Oct 2019 02:00 )   PT: 17.80 sec;   INR: 1.55 ratio         PTT - ( 04 Oct 2019 01:00 )  PTT:26.4 sec       Radiology: (reviewed by attending)

## 2019-10-05 NOTE — PROGRESS NOTE ADULT - ASSESSMENT
PROBLEMS:  I spent 45 minutes of critical care time examining patient, reviewing vitals, labs, medications, imaging and discussing with the team goals of care to prevent life-threatening in this patient who is at high risk for deterioration or death due to:    1.	CAD - s/p CABG on ASA, lipitor, lopressor  2.	Hx of A.Fib - on lopressor increase to 25 q 12  3.	s/p liver transplant - will recall GI for follow up  4.	AUDRA with oliguria - keep SBP around 120 -130 - renal consult to adjust dose of tacrolimus for AKA , repeat BMP at noon  5.	d/c A. line and intro    PLAN  Neuro: move all 4 extremities. no sensory or motor deficits  Pain management.   aspirin 325 milliGRAM(s) Oral daily  oxyCODONE    IR 10 milliGRAM(s) Oral every 4 hours PRN    Pulm: Wean off supplemental oxygen as able. Daily CXR. Encourage coughing, deep breathing and use of incentive spirometry.   ipratropium    for Nebulization 500 MICROGram(s) Nebulizer every 6 hours    Cardio: Monitor telemetry/alarms. Continue supportive care   diltiazem Infusion 10 mG/Hr IV Continuous <Continuous>  metoprolol tartrate 25 milliGRAM(s) Oral two times a day    GI: Continue stool softeners.    famotidine    Tablet 20 milliGRAM(s) Oral two times a day  senna 1 Tablet(s) Oral two times a day    Nutrition: Continue present diet  Endocrine and glucose control:   atorvastatin 10 milliGRAM(s) Oral at bedtime  dextrose 40% Gel 15 Gram(s) Oral once PRN  glucagon  Injectable 1 milliGRAM(s) IntraMuscular once PRN  insulin glargine Injectable (LANTUS) 20 Unit(s) SubCutaneous every morning  insulin lispro (HumaLOG) corrective regimen sliding scale   SubCutaneous three times a day before meals  insulin lispro Injectable (HumaLOG) 4 Unit(s) SubCutaneous before breakfast  levothyroxine 112 MICROGram(s) Oral daily    Renal: monitor urine output, supplement electrolytes as needed,     Vasc: Heparin SC and/or SCDs for DVT prophylaxis  heparin  Injectable 5000 Unit(s) SubCutaneous every 8 hours    ID: Stable, no fever , no chills. Off antibiotics.    Therapy: OOB/ambulate  Disposition: start planing discharge home or placement    Pertinent clinical, laboratory, radiographic, hemodynamic, echocardiographic, respiratory data, microbiologic data and chart were reviewed and analyzed frequently throughout the course of the day and night. GI and DVT prophylaxis, glycemic control, head of bed elevation and skin care issues were addressed.  Patient seen, examined and plan discussed with CT Surgery / CTICU team during rounds.    [ ] The patient remains in critical and unstable condition, and requires ICU care and monitoring  [x ] The patient is improving but requires continued monitoring and adjustment of therapy

## 2019-10-05 NOTE — PROGRESS NOTE ADULT - SUBJECTIVE AND OBJECTIVE BOX
CTU Attending Progress Daily Note     05 Oct 2019 12:44  Admited 10-01-19, Hospital Day 4d  POD# - 3    HPI: worsening HESS    Home Medications:  Ambien 5 mg oral tablet: 1 tab(s) orally once a day (at bedtime) (24 Sep 2019 10:23)  aspirin 81 mg oral tablet: 1 tab(s) orally once a day (24 Sep 2019 10:23)  insulin aspart: 4 UNITS subcutaneous 3 times a day (before meals) BASED ON BLOOD SUGAR (24 Sep 2019 10:23)  Lantus: 18 unit(s) subcutaneous once a day (at bedtime) (24 Sep 2019 10:23)  Lopressor 50 mg oral tablet: 1 tab(s) orally 2 times a day (24 Sep 2019 10:23)  NIFEdipine 90 mg oral tablet, extended release: 1 tab(s) orally once a day (24 Sep 2019 10:23)  Prograf 1 mg oral capsule: 1 cap(s) orally every 12 hours (24 Sep 2019 10:23)  Synthroid 112 mcg (0.112 mg) oral tablet: 1 tab(s) orally once a day (24 Sep 2019 10:23)    FAMILY HISTORY:  Family history of early CAD  FH: cancer    PAST MEDICAL & SURGICAL HISTORY:  Myocardial infarction  HTN (hypertension)  SOB (shortness of breath)  Hypothyroid  Liver transplant status  Diabetes  H/O heart artery stent: X5  Pacemaker: medtronic- last interogated 1 m ago  Liver transplanted    Interval event for past 24 hr:  NEPTALI ANNA  79y had no event.   Current Complains:  NEPTALI ANNA has no new complains  Allergies    No Known Drug Allergies  TEGADERM (Rash)    Intolerances      OBJECTIVE:  Vitals last 24 hrs  T(C): 37.2 (10-05-19 @ 08:00), Max: 37.7 (10-04-19 @ 16:00)  T(F): 99 (10-05-19 @ 08:00), Max: 99.8 (10-04-19 @ 16:00)  HR: 63 (10-05-19 @ 12:00) (60 - 88)  BP: 134/64 (10-05-19 @ 12:00) (128/60 - 145/65)  ABP: 139/48 (10-05-19 @ 09:00) (103/42 - 165/58)  ABP(mean): 79 (10-05-19 @ 09:00) (54 - 102)  RR: 22 (10-05-19 @ 12:00) (17 - 43)  SpO2: 100% (10-05-19 @ 12:00) (97% - 100%)      10-04-19 @ 07:01  -  10-05-19 @ 07:00  --------------------------------------------------------  IN:    Albumin 5%  - 500 mL: 500 mL    diltiazem Infusion: 60 mL    insulin regular Infusion: 6 mL    IV PiggyBack: 300 mL    Oral Fluid: 826 mL    sodium chloride 0.9%: 60 mL    vasopressin Infusion: 13.2 mL  Total IN: 1765.2 mL    OUT:    Indwelling Catheter - Urethral: 556 mL  Total OUT: 556 mL    Total NET: 1209.2 mL          CAPILLARY BLOOD GLUCOSE  162 (05 Oct 2019 04:30)      POCT Blood Glucose.: 179 mg/dL (05 Oct 2019 11:26)  POCT Blood Glucose.: 193 mg/dL (05 Oct 2019 06:42)  POCT Blood Glucose.: 114 mg/dL (04 Oct 2019 21:48)  POCT Blood Glucose.: 186 mg/dL (04 Oct 2019 15:42)    LABS:  ABG - ( 05 Oct 2019 04:22 )  pH, Arterial: 7.28  pH, Blood: x     /  pCO2: 43    /  pO2: 114   / HCO3: 20    / Base Excess: -6.2  /  SaO2: 99              Blood Gas Arterial, Lactate: 0.8 mmoL/L (10-05-19 @ 04:22)                          10.2   11.36 )-----------( 104      ( 05 Oct 2019 02:00 )             30.6     Hemoglobin: 10.2 g/dL (10-05 @ 02:00)  Hemoglobin: 10.5 g/dL (10-04 @ 01:00)  Hemoglobin: 10.0 g/dL (10-03 @ 17:30)  Hemoglobin: 10.2 g/dL (10-03 @ 07:20)  Hemoglobin: 6.7 g/dL (10-02 @ 23:24)  Hemoglobin: 8.8 g/dL (10-02 @ 17:30)    10-05    131<L>  |  99  |  39<H>  ----------------------------<  165<H>  5.0   |  18  |  1.5    Ca    9.5      05 Oct 2019 02:00  Mg     2.4     10-05    TPro  5.8<L>  /  Alb  4.2  /  TBili  1.2  /  DBili  x   /  AST  26  /  ALT  13  /  AlkPhos  109  10-05    Creatinine, Serum: 1.5 mg/dL (10-05 @ 02:00)  Creatinine, Serum: 1.5 mg/dL (10-04 @ 18:30)  Creatinine, Serum: 1.3 mg/dL (10-04 @ 01:00)  Creatinine, Serum: 0.9 mg/dL (10-03 @ 02:50)  Creatinine, Serum: 0.8 mg/dL (10-02 @ 17:30)  Creatinine, Serum: 0.9 mg/dL (10-02 @ 05:59)    PT/INR - ( 05 Oct 2019 02:00 )   PT: 17.80 sec;   INR: 1.55 ratio     PTT - ( 04 Oct 2019 01:00 )  PTT:26.4 sec      HOSPITAL MEDICATIONS:  MEDICATIONS  (STANDING):  aspirin 325 milliGRAM(s) Oral daily  atorvastatin 10 milliGRAM(s) Oral at bedtime  dextrose 5%. 1000 milliLiter(s) (50 mL/Hr) IV Continuous <Continuous>  dextrose 50% Injectable 50 milliLiter(s) IV Push every 15 minutes  dextrose 50% Injectable 25 milliLiter(s) IV Push every 15 minutes  docusate sodium 100 milliGRAM(s) Oral three times a day  famotidine    Tablet 20 milliGRAM(s) Oral two times a day  fluticasone propionate 50 MICROgram(s)/spray Nasal Spray 1 Spray(s) Both Nostrils two times a day  heparin  Injectable 5000 Unit(s) SubCutaneous every 8 hours  insulin glargine Injectable (LANTUS) 20 Unit(s) SubCutaneous every morning  insulin lispro (HumaLOG) corrective regimen sliding scale   SubCutaneous three times a day before meals  insulin lispro Injectable (HumaLOG) 4 Unit(s) SubCutaneous before breakfast  ipratropium    for Nebulization 500 MICROGram(s) Nebulizer every 6 hours  levothyroxine 112 MICROGram(s) Oral daily  magnesium sulfate  IVPB 1 Gram(s) IV Intermittent every 12 hours  meperidine     Injectable 25 milliGRAM(s) IV Push once  metoprolol tartrate 25 milliGRAM(s) Oral two times a day  polyethylene glycol 3350 17 Gram(s) Oral daily  senna 1 Tablet(s) Oral two times a day  tacrolimus 1 milliGRAM(s) Oral every 12 hours    MEDICATIONS  (PRN):  dextrose 40% Gel 15 Gram(s) Oral once PRN Blood Glucose LESS THAN 70 milliGRAM(s)/deciliter  glucagon  Injectable 1 milliGRAM(s) IntraMuscular once PRN Glucose LESS THAN 70 milligrams/deciliter  oxyCODONE    IR 10 milliGRAM(s) Oral every 4 hours PRN Severe Pain (7 - 10)      REVIEW OF SYSTEMS:  CONSTITUTIONAL: [X] all negative; [ ] weakness, [ ] fevers, [ ] chills  EYES/ENT: [X] all negative; [ ] visual changes, [ ] vertigo, [ ] throat pain, [ ] eye pain  NECK: [X] all negative; [ ] pain, [ ] stiffness  RESPIRATORY: [ ] all negative, [x ] cough, [ ] wheezing, [ ] hemoptysis, [x ] shortness of breath  CARDIOVASCULAR: [ ] all negative; [x ] chest pain, [ ] palpitations, [ ] orthopnea  GASTROINTESTINAL: [X] all negative; [ ]abdominal pain, [ ] nausea, [ ] vomiting, [ ] hematemesis, [ ] diarrhea, [ ] constipation, [ ] melena, [ ] hematochezia.  GENITOURINARY: [X] all negative; [ ] dysuria, [ ] frequency, [ ] hematuria  NEUROLOGICAL: [X] all negative; [ ] numbness, [ ] weakness, [ ] paresthesias  MUSCULOSKELETAL: [X] all negative, [ ] joint pain, [ ] joint swelling, [ ] joint redness, [ ] bone pain  SKIN: [X] all negative; [ ] itching, [ ] burning, [ ] rashes, [ ] lesions   All other review of systems is negative unless indicated above.    [  ] Unable to assess ROS because     PHYSICAL EXAM:          CONSTITUTIONAL: Well-developed; well-nourished; in no acute distress.   	SKIN: warm, dry, no rashes or lesions  	HEENT: Atraumatic. Normocephalic. PERRL. Moist membranes, no conj injection, sclera clear  	NECK: Supple; non tender.  No JVD. No lymphadenopathy.  	CARD: Normal S1, S2. Rate and Rhythm are regular. No murmurs.  	RESP: decreased air entry on left base, no wheezes, + rales no rhonchi.  	ABD: Soft, not tender, not distended, no CVA ttp no rebound no guarding, bowel sounds present  	EXT: Normal ROM.  No clubbing, no cyanosis, + pedal edema, no calf pain b/l, Peripheral pulses intact.  	LYMPH: No acute cervical adenopathy.  	NEURO: Alert, awake, motor 5/5 R, 5/5 L, sensation intact bilat, CN 2-12 intact,          PSYCH: Cooperative, appropriate. Alert & oriented x 3    RADIOLOGY:  X Reviewed and interpreted by me  CxR from 10-05-19 shows mild congestion, no pneumothorax, left small effusion, no cardiomegaly,   Intro in place    ECG:  X Reviewed and interpreted by me - NSR

## 2019-10-05 NOTE — PROGRESS NOTE ADULT - ASSESSMENT
78 yo female with CAD s/p PCI x 5 (done in [place])  over may years (LAD/LCX/OM), last PCI> 5 years ago, PPM ([facility]) complicated by pericardial tamponade s/p pericardiocentesis ([place]) presenting for dyspnea on exertion. pt with PMH of hep B complicated by HCC s/p liver transplant > 10 year ago. pt was found to have 3 vessels disease on cardiac cath.  patient is day 3 post CABG     # Reported Hepatitis B infection complicated by HCC s/p liver transplant >10 year ago:    LFTs NORMAL     -patient developed AUDRA with creatinine from 0.9 to 1.5 , please decrease the dosage of tacrolimus to 0.5 mg twice daily , when creatinine is back to baseline , please resume tacrolimus 1 mg twice daily  -please check tacrolimus  trough level prior to discharge   - avoid hepatotoxic drugs, avoid hypotension  - please be wary of medications with any interactions with Tacrolimus (avoid inducers and inhibitors of CY)  -Please check hepatitis B serology to clarify hep B status and further management if needed.-  -workup for renal failure and optimization of kidney function

## 2019-10-06 LAB
ALBUMIN SERPL ELPH-MCNC: 4.1 G/DL — SIGNIFICANT CHANGE UP (ref 3.5–5.2)
ALP SERPL-CCNC: 163 U/L — HIGH (ref 30–115)
ALT FLD-CCNC: 14 U/L — SIGNIFICANT CHANGE UP (ref 0–41)
ANION GAP SERPL CALC-SCNC: 10 MMOL/L — SIGNIFICANT CHANGE UP (ref 7–14)
AST SERPL-CCNC: 33 U/L — SIGNIFICANT CHANGE UP (ref 0–41)
BASOPHILS # BLD AUTO: 0.02 K/UL — SIGNIFICANT CHANGE UP (ref 0–0.2)
BASOPHILS NFR BLD AUTO: 0.2 % — SIGNIFICANT CHANGE UP (ref 0–1)
BILIRUB SERPL-MCNC: 1 MG/DL — SIGNIFICANT CHANGE UP (ref 0.2–1.2)
BUN SERPL-MCNC: 45 MG/DL — HIGH (ref 10–20)
CALCIUM SERPL-MCNC: 9.3 MG/DL — SIGNIFICANT CHANGE UP (ref 8.5–10.1)
CHLORIDE SERPL-SCNC: 99 MMOL/L — SIGNIFICANT CHANGE UP (ref 98–110)
CO2 SERPL-SCNC: 22 MMOL/L — SIGNIFICANT CHANGE UP (ref 17–32)
CREAT SERPL-MCNC: 1.3 MG/DL — SIGNIFICANT CHANGE UP (ref 0.7–1.5)
EOSINOPHIL # BLD AUTO: 0.34 K/UL — SIGNIFICANT CHANGE UP (ref 0–0.7)
EOSINOPHIL NFR BLD AUTO: 3.3 % — SIGNIFICANT CHANGE UP (ref 0–8)
GLUCOSE BLDC GLUCOMTR-MCNC: 109 MG/DL — HIGH (ref 70–99)
GLUCOSE BLDC GLUCOMTR-MCNC: 135 MG/DL — HIGH (ref 70–99)
GLUCOSE BLDC GLUCOMTR-MCNC: 175 MG/DL — HIGH (ref 70–99)
GLUCOSE BLDC GLUCOMTR-MCNC: 90 MG/DL — SIGNIFICANT CHANGE UP (ref 70–99)
GLUCOSE SERPL-MCNC: 99 MG/DL — SIGNIFICANT CHANGE UP (ref 70–99)
HCT VFR BLD CALC: 31.9 % — LOW (ref 37–47)
HGB BLD-MCNC: 10.4 G/DL — LOW (ref 12–16)
IMM GRANULOCYTES NFR BLD AUTO: 0.6 % — HIGH (ref 0.1–0.3)
INR BLD: 1.25 RATIO — SIGNIFICANT CHANGE UP (ref 0.65–1.3)
LYMPHOCYTES # BLD AUTO: 1.42 K/UL — SIGNIFICANT CHANGE UP (ref 1.2–3.4)
LYMPHOCYTES # BLD AUTO: 13.9 % — LOW (ref 20.5–51.1)
MAGNESIUM SERPL-MCNC: 2.7 MG/DL — HIGH (ref 1.8–2.4)
MCHC RBC-ENTMCNC: 29.3 PG — SIGNIFICANT CHANGE UP (ref 27–31)
MCHC RBC-ENTMCNC: 32.6 G/DL — SIGNIFICANT CHANGE UP (ref 32–37)
MCV RBC AUTO: 89.9 FL — SIGNIFICANT CHANGE UP (ref 81–99)
MONOCYTES # BLD AUTO: 0.97 K/UL — HIGH (ref 0.1–0.6)
MONOCYTES NFR BLD AUTO: 9.5 % — HIGH (ref 1.7–9.3)
NEUTROPHILS # BLD AUTO: 7.39 K/UL — HIGH (ref 1.4–6.5)
NEUTROPHILS NFR BLD AUTO: 72.5 % — SIGNIFICANT CHANGE UP (ref 42.2–75.2)
NRBC # BLD: 0 /100 WBCS — SIGNIFICANT CHANGE UP (ref 0–0)
PLATELET # BLD AUTO: 140 K/UL — SIGNIFICANT CHANGE UP (ref 130–400)
POTASSIUM SERPL-MCNC: 4.7 MMOL/L — SIGNIFICANT CHANGE UP (ref 3.5–5)
POTASSIUM SERPL-SCNC: 4.7 MMOL/L — SIGNIFICANT CHANGE UP (ref 3.5–5)
PROT SERPL-MCNC: 5.9 G/DL — LOW (ref 6–8)
PROTHROM AB SERPL-ACNC: 14.3 SEC — HIGH (ref 9.95–12.87)
RBC # BLD: 3.55 M/UL — LOW (ref 4.2–5.4)
RBC # FLD: 13.8 % — SIGNIFICANT CHANGE UP (ref 11.5–14.5)
SODIUM SERPL-SCNC: 131 MMOL/L — LOW (ref 135–146)
WBC # BLD: 10.2 K/UL — SIGNIFICANT CHANGE UP (ref 4.8–10.8)
WBC # FLD AUTO: 10.2 K/UL — SIGNIFICANT CHANGE UP (ref 4.8–10.8)

## 2019-10-06 PROCEDURE — 71045 X-RAY EXAM CHEST 1 VIEW: CPT | Mod: 26

## 2019-10-06 PROCEDURE — 93010 ELECTROCARDIOGRAM REPORT: CPT

## 2019-10-06 PROCEDURE — 99232 SBSQ HOSP IP/OBS MODERATE 35: CPT

## 2019-10-06 RX ORDER — BACITRACIN ZINC 500 UNIT/G
1 OINTMENT IN PACKET (EA) TOPICAL
Refills: 0 | Status: DISCONTINUED | OUTPATIENT
Start: 2019-10-06 | End: 2019-10-18

## 2019-10-06 RX ADMIN — TACROLIMUS 0.5 MILLIGRAM(S): 5 CAPSULE ORAL at 18:25

## 2019-10-06 RX ADMIN — Medication 325 MILLIGRAM(S): at 11:23

## 2019-10-06 RX ADMIN — Medication 112 MICROGRAM(S): at 05:14

## 2019-10-06 RX ADMIN — HEPARIN SODIUM 5000 UNIT(S): 5000 INJECTION INTRAVENOUS; SUBCUTANEOUS at 14:00

## 2019-10-06 RX ADMIN — Medication 100 MILLIGRAM(S): at 18:23

## 2019-10-06 RX ADMIN — Medication 100 MILLIGRAM(S): at 21:19

## 2019-10-06 RX ADMIN — FAMOTIDINE 20 MILLIGRAM(S): 10 INJECTION INTRAVENOUS at 05:14

## 2019-10-06 RX ADMIN — INSULIN GLARGINE 20 UNIT(S): 100 INJECTION, SOLUTION SUBCUTANEOUS at 11:24

## 2019-10-06 RX ADMIN — POLYETHYLENE GLYCOL 3350 17 GRAM(S): 17 POWDER, FOR SOLUTION ORAL at 11:23

## 2019-10-06 RX ADMIN — Medication 1 SPRAY(S): at 05:15

## 2019-10-06 RX ADMIN — Medication 1 APPLICATION(S): at 11:00

## 2019-10-06 RX ADMIN — Medication 1: at 11:26

## 2019-10-06 RX ADMIN — Medication 100 GRAM(S): at 18:29

## 2019-10-06 RX ADMIN — Medication 100 GRAM(S): at 05:15

## 2019-10-06 RX ADMIN — SENNA PLUS 1 TABLET(S): 8.6 TABLET ORAL at 18:25

## 2019-10-06 RX ADMIN — ATORVASTATIN CALCIUM 10 MILLIGRAM(S): 80 TABLET, FILM COATED ORAL at 21:19

## 2019-10-06 RX ADMIN — Medication 500 MICROGRAM(S): at 13:47

## 2019-10-06 RX ADMIN — FAMOTIDINE 20 MILLIGRAM(S): 10 INJECTION INTRAVENOUS at 18:24

## 2019-10-06 RX ADMIN — TACROLIMUS 0.5 MILLIGRAM(S): 5 CAPSULE ORAL at 05:14

## 2019-10-06 RX ADMIN — HEPARIN SODIUM 5000 UNIT(S): 5000 INJECTION INTRAVENOUS; SUBCUTANEOUS at 05:15

## 2019-10-06 RX ADMIN — HEPARIN SODIUM 5000 UNIT(S): 5000 INJECTION INTRAVENOUS; SUBCUTANEOUS at 21:19

## 2019-10-06 RX ADMIN — Medication 1 SPRAY(S): at 18:25

## 2019-10-06 RX ADMIN — Medication 25 MILLIGRAM(S): at 18:24

## 2019-10-06 RX ADMIN — Medication 25 MILLIGRAM(S): at 05:14

## 2019-10-06 RX ADMIN — Medication 1 APPLICATION(S): at 18:24

## 2019-10-06 NOTE — PROGRESS NOTE ADULT - SUBJECTIVE AND OBJECTIVE BOX
CTU Attending Progress Daily Note     06 Oct 2019 09:12    Procedure:                                                  POD#                   Patient seen as post-op critical care follow-up    HPI:    See preop testing chart H&P    Interval event for past 24 hr:  NEPTALI ANNA  79y had no event.     Current Complains:  NEPTALI ANNA has no new complaints    REVIEW OF SYSTEMS:  CONSTITUTIONAL:  [-] weakness, [-] fevers, [-] chills  EYES/ENT: [-] visual changes, [-] vertigo, [-] throat pain   NECK: [-] pain, [-] stiffness  RESPIRATORY: [-] cough, [-] wheezing, [-] hemoptysis, [-] shortness of breath  CARDIOVASCULAR: [-] chest pain, [-] palpitations, [-] orthopnea  GASTROINTESTINAL:    [-]abdominal pain, [-] nausea, [-] vomiting, [-] hematemesis, [-] diarrhea, [-] constipation, [-] melena, [-] hematochezia.  GENITOURINARY: [-] dysuria, [-] frequency, [-] hematuria  NEUROLOGICAL: [-] numbness, [-] weakness  SKIN: [-] itching, [-] burning, [-] rashes, [-] lesions   All other review of systems is negative unless indicated above.    [  ] Unable to assess ROS because :    OBJECTIVE:  ICU Vital Signs Last 24 Hrs  T(C): 36.8 (06 Oct 2019 08:00), Max: 37 (05 Oct 2019 12:00)  T(F): 98.2 (06 Oct 2019 08:00), Max: 98.6 (05 Oct 2019 12:00)  HR: 60 (06 Oct 2019 08:00) (60 - 88)  BP: 133/61 (06 Oct 2019 08:00) (108/54 - 161/70)  BP(mean): 88 (06 Oct 2019 08:00) (77 - 122)  ABP: --  ABP(mean): --  RR: 29 (06 Oct 2019 08:00) (18 - 33)  SpO2: 97% (06 Oct 2019 08:00) (93% - 100%)      I&O's Summary    05 Oct 2019 07:01  -  06 Oct 2019 07:00  --------------------------------------------------------  IN: 1135 mL / OUT: 928 mL / NET: 207 mL      Adult Advanced Hemodynamics Last 24 Hrs  CVP(mm Hg): --  CVP(cm H2O): --  CO: --  CI: --  PA: --  PA(mean): --  PCWP: --  SVR: --  SVRI: --  PVR: --  PVRI: --      PHYSICAL EXAM:  General: WN/WD NAD    HEENT:     [+] NCAT  [+] EOMI  [-] Conjuctival edema   [-] Icterus   [-] Thrush   [-] ETT  [-] NGT/OGT    Neck:         [+] FROM   [-] JVD     [-] Nodes     [-] Masses    [+] Mid-line trachea    [-] Tracheostomy    Chest:         [-] Sternal click   [-] Sternal drainage   [+] Pacing wires   [+] Chest tubes   [-] SubQ emphysema    Lungs:          [+] CTA   [-] Rhonchi   [-] Rales    [-] Wheezing    [-] Decreased BS    [-] Dullness R L    Cardiac:       [+] S1 [+] S2    [+] RRR   [-] Irregular   [-] S3   [-] S4    [-] Murmurs    [-] Rub    Abdomen:    [+] BS    [+] Soft    [+] Non-tender     [-] Distended    [-] Organomegaly  [-] PEG    Extremities:   [-] Cyanosis U/L   [-] Clubbing  U/L  [-] LE/UE Edema   [+] Capillary refill    [+] Pulses     Neuro:        [+] Awake   [+]  Alert   [-] Confused   [-] Lethargic   [-] Sedated   [-] Generalized Weakness    Skin:        [-] Rashes    [-] Erythema   [+] Normal incisions   [+] IV sites intact   [-] Sacral decubitus    Tubes:  LINES:    CAPILLARY BLOOD GLUCOSE  162 (05 Oct 2019 04:30)      POCT Blood Glucose.: 90 mg/dL (06 Oct 2019 06:34)    CAPILLARY BLOOD GLUCOSE      POCT Blood Glucose.: 90 mg/dL (06 Oct 2019 06:34)  POCT Blood Glucose.: 95 mg/dL (05 Oct 2019 21:52)  POCT Blood Glucose.: 164 mg/dL (05 Oct 2019 16:19)  POCT Blood Glucose.: 179 mg/dL (05 Oct 2019 11:26)      HOSPITAL MEDICATIONS:  MEDICATIONS  (STANDING):  aspirin 325 milliGRAM(s) Oral daily  atorvastatin 10 milliGRAM(s) Oral at bedtime  dextrose 5%. 1000 milliLiter(s) (50 mL/Hr) IV Continuous <Continuous>  dextrose 50% Injectable 50 milliLiter(s) IV Push every 15 minutes  dextrose 50% Injectable 25 milliLiter(s) IV Push every 15 minutes  diltiazem Infusion 10 mG/Hr (10 mL/Hr) IV Continuous <Continuous>  docusate sodium 100 milliGRAM(s) Oral three times a day  famotidine    Tablet 20 milliGRAM(s) Oral two times a day  fluticasone propionate 50 MICROgram(s)/spray Nasal Spray 1 Spray(s) Both Nostrils two times a day  heparin  Injectable 5000 Unit(s) SubCutaneous every 8 hours  insulin glargine Injectable (LANTUS) 20 Unit(s) SubCutaneous every morning  insulin lispro (HumaLOG) corrective regimen sliding scale   SubCutaneous three times a day before meals  insulin lispro Injectable (HumaLOG) 4 Unit(s) SubCutaneous before breakfast  ipratropium    for Nebulization 500 MICROGram(s) Nebulizer every 6 hours  levothyroxine 112 MICROGram(s) Oral daily  magnesium sulfate  IVPB 1 Gram(s) IV Intermittent every 12 hours  meperidine     Injectable 25 milliGRAM(s) IV Push once  metoprolol tartrate 25 milliGRAM(s) Oral two times a day  polyethylene glycol 3350 17 Gram(s) Oral daily  senna 1 Tablet(s) Oral two times a day  tacrolimus 0.5 milliGRAM(s) Oral every 12 hours    MEDICATIONS  (PRN):  dextrose 40% Gel 15 Gram(s) Oral once PRN Blood Glucose LESS THAN 70 milliGRAM(s)/deciliter  glucagon  Injectable 1 milliGRAM(s) IntraMuscular once PRN Glucose LESS THAN 70 milligrams/deciliter  oxyCODONE    IR 10 milliGRAM(s) Oral every 4 hours PRN Severe Pain (7 - 10)      LABS:  ABG - ( 05 Oct 2019 04:22 )  pH, Arterial: 7.28  pH, Blood: x     /  pCO2: 43    /  pO2: 114   / HCO3: 20    / Base Excess: -6.2  /  SaO2: 99                                      10.4   10.20 )-----------( 140      ( 06 Oct 2019 02:00 )             31.9     10-06    131<L>  |  99  |  45<H>  ----------------------------<  99  4.7   |  22  |  1.3    Ca    9.3      06 Oct 2019 02:00  Mg     2.7     10-06    TPro  5.9<L>  /  Alb  4.1  /  TBili  1.0  /  DBili  x   /  AST  33  /  ALT  14  /  AlkPhos  163<H>  10-06    PT/INR - ( 06 Oct 2019 02:00 )   PT: 14.30 sec;   INR: 1.25 ratio                 RADIOLOGY:  Reviewed and interpreted by me  CXR from 10-06-19 shows [+] mild congestion, [-] pneumothorax, [-] R/L effusion, [-] cardiomegaly,   NGT in place, S-G Catheter in place, R/L TLC in place, R/L Chest Tubes in place    ECG:  Reviewed and interpreted by me:   QTC:    Assessment:      PAST MEDICAL & SURGICAL HISTORY:  Myocardial infarction  HTN (hypertension)  SOB (shortness of breath)  Hypothyroid  Liver transplant status  Diabetes  H/O heart artery stent: X5  Pacemaker: medtronic- last interogated 1 m ago  Liver transplanted      PLAN:  Neuro: Pain control  Pulm: Encourage coughing, deep breathing and use of incentive spirometry. Wean off supplemental oxygen as able. Daily CXR.   Cardio: Monitor telemetry/alarms. Continue cardiac meds  GI: Tolerating diet. Continue stool softeners. Continue GI prophylaxis  Renal: monitor urine output, supplement electrolytes as needed  Vasc: Heparin SC/SCDs for DVT prophylaxis  Heme: Monitor H/H.   ID: Off antibiotics. Stable.  Endocrine: Monitor finger stick blood sugar and control hyperglycemia with insulin  Physical Therapy: OOB/ambulate  Tubes: Monitor Chest tube output      Discussed with Cardiothoracic Team at AM rounds.    45 minutes of critical care time spent providing medical care for patient's acute illness/conditions that impairs at least one vital organ system and/or poses a high risk of imminent or life threatening deterioration in the patient's condition. It includes time spent evaluating and treating the patient's acute illness as well as time spent reviewing labs, radiology, discussing goals of care with patient and/or patient's family, and discussing the case with a multidisciplinary team in an effort to prevent further life threatening deterioration or end organ damage. This time is independent of any procedures performed. CTU Attending Progress Daily Note     06 Oct 2019 09:12    Procedure:       CABG 5                                           POD#           4        Patient seen as post-op critical care follow-up    HPI:    See preop testing chart H&P    Interval event for past 24 hr:  NEPTALI ANNA  79y had no event.     Current Complains:  NEPTALI ANNA has no new complaints    REVIEW OF SYSTEMS:  CONSTITUTIONAL:  [-] weakness, [-] fevers, [-] chills  EYES/ENT: [-] visual changes, [-] vertigo, [-] throat pain   NECK: [-] pain, [-] stiffness  RESPIRATORY: [-] cough, [-] wheezing, [-] hemoptysis, [-] shortness of breath  CARDIOVASCULAR: [-] chest pain, [-] palpitations, [-] orthopnea  GASTROINTESTINAL:    [-]abdominal pain, [-] nausea, [-] vomiting, [-] hematemesis, [-] diarrhea, [-] constipation, [-] melena, [-] hematochezia.  GENITOURINARY: [-] dysuria, [-] frequency, [-] hematuria  NEUROLOGICAL: [-] numbness, [-] weakness  SKIN: [-] itching, [-] burning, [-] rashes, [-] lesions   All other review of systems is negative unless indicated above.    [  ] Unable to assess ROS because :    OBJECTIVE:  ICU Vital Signs Last 24 Hrs  T(C): 36.8 (06 Oct 2019 08:00), Max: 37 (05 Oct 2019 12:00)  T(F): 98.2 (06 Oct 2019 08:00), Max: 98.6 (05 Oct 2019 12:00)  HR: 60 (06 Oct 2019 08:00) (60 - 88)  BP: 133/61 (06 Oct 2019 08:00) (108/54 - 161/70)  BP(mean): 88 (06 Oct 2019 08:00) (77 - 122)  ABP: --  ABP(mean): --  RR: 29 (06 Oct 2019 08:00) (18 - 33)  SpO2: 97% (06 Oct 2019 08:00) (93% - 100%)      I&O's Summary    05 Oct 2019 07:01  -  06 Oct 2019 07:00  --------------------------------------------------------  IN: 1135 mL / OUT: 928 mL / NET: 207 mL      PHYSICAL EXAM:  General: WN/WD NAD    HEENT:     [+] NCAT  [+] EOMI  [-] Conjuctival edema   [-] Icterus   [-] Thrush   [-] ETT  [-] NGT/OGT    Neck:         [+] FROM   [-] JVD     [-] Nodes     [-] Masses    [+] Mid-line trachea    [-] Tracheostomy    Chest:         [-] Sternal click   [-] Sternal drainage   [+] Pacing wires   [-] Chest tubes   [-] SubQ emphysema    Lungs:          [+] CTA   [-] Rhonchi   [-] Rales    [-] Wheezing    [-] Decreased BS    [-] Dullness R L    Cardiac:       [+] S1 [+] S2    [+] RRR   [-] Irregular   [-] S3   [-] S4    [-] Murmurs    [-] Rub    Abdomen:    [+] BS    [+] Soft    [+] Non-tender     [-] Distended    [-] Organomegaly  [-] PEG    Extremities:   [-] Cyanosis U/L   [-] Clubbing  U/L  [-] LE/UE Edema   [+] Capillary refill    [+] Pulses     Neuro:        [+] Awake   [+]  Alert   [-] Confused   [-] Lethargic   [-] Sedated   [-] Generalized Weakness    Skin:        [-] Rashes    [-] Erythema   [+] Normal incisions   [+] IV sites intact   [-] Sacral decubitus    Tubes:  LINES:    CAPILLARY BLOOD GLUCOSE  162 (05 Oct 2019 04:30)      POCT Blood Glucose.: 90 mg/dL (06 Oct 2019 06:34)    CAPILLARY BLOOD GLUCOSE      POCT Blood Glucose.: 90 mg/dL (06 Oct 2019 06:34)  POCT Blood Glucose.: 95 mg/dL (05 Oct 2019 21:52)  POCT Blood Glucose.: 164 mg/dL (05 Oct 2019 16:19)  POCT Blood Glucose.: 179 mg/dL (05 Oct 2019 11:26)      HOSPITAL MEDICATIONS:  MEDICATIONS  (STANDING):  aspirin 325 milliGRAM(s) Oral daily  atorvastatin 10 milliGRAM(s) Oral at bedtime  dextrose 5%. 1000 milliLiter(s) (50 mL/Hr) IV Continuous <Continuous>  dextrose 50% Injectable 50 milliLiter(s) IV Push every 15 minutes  dextrose 50% Injectable 25 milliLiter(s) IV Push every 15 minutes  diltiazem Infusion 10 mG/Hr (10 mL/Hr) IV Continuous <Continuous>  docusate sodium 100 milliGRAM(s) Oral three times a day  famotidine    Tablet 20 milliGRAM(s) Oral two times a day  fluticasone propionate 50 MICROgram(s)/spray Nasal Spray 1 Spray(s) Both Nostrils two times a day  heparin  Injectable 5000 Unit(s) SubCutaneous every 8 hours  insulin glargine Injectable (LANTUS) 20 Unit(s) SubCutaneous every morning  insulin lispro (HumaLOG) corrective regimen sliding scale   SubCutaneous three times a day before meals  insulin lispro Injectable (HumaLOG) 4 Unit(s) SubCutaneous before breakfast  ipratropium    for Nebulization 500 MICROGram(s) Nebulizer every 6 hours  levothyroxine 112 MICROGram(s) Oral daily  magnesium sulfate  IVPB 1 Gram(s) IV Intermittent every 12 hours  meperidine     Injectable 25 milliGRAM(s) IV Push once  metoprolol tartrate 25 milliGRAM(s) Oral two times a day  polyethylene glycol 3350 17 Gram(s) Oral daily  senna 1 Tablet(s) Oral two times a day  tacrolimus 0.5 milliGRAM(s) Oral every 12 hours    MEDICATIONS  (PRN):  dextrose 40% Gel 15 Gram(s) Oral once PRN Blood Glucose LESS THAN 70 milliGRAM(s)/deciliter  glucagon  Injectable 1 milliGRAM(s) IntraMuscular once PRN Glucose LESS THAN 70 milligrams/deciliter  oxyCODONE    IR 10 milliGRAM(s) Oral every 4 hours PRN Severe Pain (7 - 10)      LABS:  ABG - ( 05 Oct 2019 04:22 )  pH, Arterial: 7.28  pH, Blood: x     /  pCO2: 43    /  pO2: 114   / HCO3: 20    / Base Excess: -6.2  /  SaO2: 99                                      10.4   10.20 )-----------( 140      ( 06 Oct 2019 02:00 )             31.9     10-06    131<L>  |  99  |  45<H>  ----------------------------<  99  4.7   |  22  |  1.3    Ca    9.3      06 Oct 2019 02:00  Mg     2.7     10-06    TPro  5.9<L>  /  Alb  4.1  /  TBili  1.0  /  DBili  x   /  AST  33  /  ALT  14  /  AlkPhos  163<H>  10-06    PT/INR - ( 06 Oct 2019 02:00 )   PT: 14.30 sec;   INR: 1.25 ratio                 RADIOLOGY:  Reviewed and interpreted by me  CXR from 10-06-19 shows [+] mild congestion, [-] pneumothorax, [+] R/L effusion, [-] cardiomegaly,       ECG:  Reviewed and interpreted by me: Paced 69  QTC: 353    Assessment:  CAD SP CABG    PAST MEDICAL & SURGICAL HISTORY:  Myocardial infarction  HTN (hypertension)  SOB (shortness of breath)  Hypothyroid  Liver transplant status  Diabetes  H/O heart artery stent: X5  Pacemaker: medtronic- last interogated 1 m ago  Liver transplanted      PLAN:  Neuro: Pain control  Pulm: Encourage coughing, deep breathing and use of incentive spirometry. Wean off supplemental oxygen as able. Daily CXR.   Cardio: Monitor telemetry/alarms. Continue cardiac meds  GI: Tolerating diet. Continue stool softeners. Continue GI prophylaxis  Renal: monitor urine output, supplement electrolytes as needed  Vasc: Heparin SC/SCDs for DVT prophylaxis  Heme: Monitor H/H.   ID: Off antibiotics. Stable.  Endocrine: Monitor finger stick blood sugar and control hyperglycemia with insulin  Physical Therapy: OOB/ambulate        Discussed with Cardiothoracic Team at AM rounds. CTU Attending Progress Daily Note     06 Oct 2019 09:12    Procedure:       CABG 5                                           POD#           4        Patient seen as post-op critical care follow-up    HPI:    See preop testing chart H&P    Interval event for past 24 hr:  NEPTALI ANNA  79y had no event.     Current Complains:  NEPTALI ANNA has no new complaints    REVIEW OF SYSTEMS:  CONSTITUTIONAL:  [-] weakness, [-] fevers, [-] chills  EYES/ENT: [-] visual changes, [-] vertigo, [-] throat pain   NECK: [-] pain, [-] stiffness  RESPIRATORY: [-] cough, [-] wheezing, [-] hemoptysis, [-] shortness of breath  CARDIOVASCULAR: [-] chest pain, [-] palpitations, [-] orthopnea  GASTROINTESTINAL:    [-]abdominal pain, [-] nausea, [-] vomiting, [-] hematemesis, [-] diarrhea, [-] constipation, [-] melena, [-] hematochezia.  GENITOURINARY: [-] dysuria, [-] frequency, [-] hematuria  NEUROLOGICAL: [-] numbness, [-] weakness  SKIN: [-] itching, [-] burning, [-] rashes, [-] lesions   All other review of systems is negative unless indicated above.    [  ] Unable to assess ROS because :    OBJECTIVE:  ICU Vital Signs Last 24 Hrs  T(C): 36.8 (06 Oct 2019 08:00), Max: 37 (05 Oct 2019 12:00)  T(F): 98.2 (06 Oct 2019 08:00), Max: 98.6 (05 Oct 2019 12:00)  HR: 60 (06 Oct 2019 08:00) (60 - 88)  BP: 133/61 (06 Oct 2019 08:00) (108/54 - 161/70)  BP(mean): 88 (06 Oct 2019 08:00) (77 - 122)  ABP: --  ABP(mean): --  RR: 29 (06 Oct 2019 08:00) (18 - 33)  SpO2: 97% (06 Oct 2019 08:00) (93% - 100%)      I&O's Summary    05 Oct 2019 07:01  -  06 Oct 2019 07:00  --------------------------------------------------------  IN: 1135 mL / OUT: 928 mL / NET: 207 mL      PHYSICAL EXAM:  General: WN/WD NAD    HEENT:     [+] NCAT  [+] EOMI  [-] Conjuctival edema   [-] Icterus   [-] Thrush   [-] ETT  [-] NGT/OGT    Neck:         [+] FROM   [-] JVD     [-] Nodes     [-] Masses    [+] Mid-line trachea    [-] Tracheostomy    Chest:         [-] Sternal click   [-] Sternal drainage   [+] Pacing wires   [-] Chest tubes   [-] SubQ emphysema    Lungs:          [+] CTA   [-] Rhonchi   [-] Rales    [-] Wheezing    [-] Decreased BS    [-] Dullness R L    Cardiac:       [+] S1 [+] S2    [+] RRR   [-] Irregular   [-] S3   [-] S4    [-] Murmurs    [-] Rub    Abdomen:    [+] BS    [+] Soft    [+] Non-tender     [-] Distended    [-] Organomegaly  [-] PEG    Extremities:   [-] Cyanosis U/L   [-] Clubbing  U/L  [-] LE/UE Edema   [+] Capillary refill    [+] Pulses     Neuro:        [+] Awake   [+]  Alert   [-] Confused   [-] Lethargic   [-] Sedated   [-] Generalized Weakness    Skin:        [-] Rashes    [-] Erythema   [+] Normal incisions   [+] IV sites intact   [-] Sacral decubitus    Tubes:  LINES:    CAPILLARY BLOOD GLUCOSE  162 (05 Oct 2019 04:30)      POCT Blood Glucose.: 90 mg/dL (06 Oct 2019 06:34)    CAPILLARY BLOOD GLUCOSE      POCT Blood Glucose.: 90 mg/dL (06 Oct 2019 06:34)  POCT Blood Glucose.: 95 mg/dL (05 Oct 2019 21:52)  POCT Blood Glucose.: 164 mg/dL (05 Oct 2019 16:19)  POCT Blood Glucose.: 179 mg/dL (05 Oct 2019 11:26)      HOSPITAL MEDICATIONS:  MEDICATIONS  (STANDING):  aspirin 325 milliGRAM(s) Oral daily  atorvastatin 10 milliGRAM(s) Oral at bedtime  dextrose 5%. 1000 milliLiter(s) (50 mL/Hr) IV Continuous <Continuous>  dextrose 50% Injectable 50 milliLiter(s) IV Push every 15 minutes  dextrose 50% Injectable 25 milliLiter(s) IV Push every 15 minutes  diltiazem Infusion 10 mG/Hr (10 mL/Hr) IV Continuous <Continuous>  docusate sodium 100 milliGRAM(s) Oral three times a day  famotidine    Tablet 20 milliGRAM(s) Oral two times a day  fluticasone propionate 50 MICROgram(s)/spray Nasal Spray 1 Spray(s) Both Nostrils two times a day  heparin  Injectable 5000 Unit(s) SubCutaneous every 8 hours  insulin glargine Injectable (LANTUS) 20 Unit(s) SubCutaneous every morning  insulin lispro (HumaLOG) corrective regimen sliding scale   SubCutaneous three times a day before meals  insulin lispro Injectable (HumaLOG) 4 Unit(s) SubCutaneous before breakfast  ipratropium    for Nebulization 500 MICROGram(s) Nebulizer every 6 hours  levothyroxine 112 MICROGram(s) Oral daily  magnesium sulfate  IVPB 1 Gram(s) IV Intermittent every 12 hours  meperidine     Injectable 25 milliGRAM(s) IV Push once  metoprolol tartrate 25 milliGRAM(s) Oral two times a day  polyethylene glycol 3350 17 Gram(s) Oral daily  senna 1 Tablet(s) Oral two times a day  tacrolimus 0.5 milliGRAM(s) Oral every 12 hours    MEDICATIONS  (PRN):  dextrose 40% Gel 15 Gram(s) Oral once PRN Blood Glucose LESS THAN 70 milliGRAM(s)/deciliter  glucagon  Injectable 1 milliGRAM(s) IntraMuscular once PRN Glucose LESS THAN 70 milligrams/deciliter  oxyCODONE    IR 10 milliGRAM(s) Oral every 4 hours PRN Severe Pain (7 - 10)      LABS:  ABG - ( 05 Oct 2019 04:22 )  pH, Arterial: 7.28  pH, Blood: x     /  pCO2: 43    /  pO2: 114   / HCO3: 20    / Base Excess: -6.2  /  SaO2: 99                                      10.4   10.20 )-----------( 140      ( 06 Oct 2019 02:00 )             31.9     10-06    131<L>  |  99  |  45<H>  ----------------------------<  99  4.7   |  22  |  1.3    Ca    9.3      06 Oct 2019 02:00  Mg     2.7     10-06    TPro  5.9<L>  /  Alb  4.1  /  TBili  1.0  /  DBili  x   /  AST  33  /  ALT  14  /  AlkPhos  163<H>  10-06    PT/INR - ( 06 Oct 2019 02:00 )   PT: 14.30 sec;   INR: 1.25 ratio                 RADIOLOGY:  Reviewed and interpreted by me  CXR from 10-06-19 shows [+] mild congestion, [-] pneumothorax, [+] R/L effusion, [-] cardiomegaly,       ECG:  Reviewed and interpreted by me: Paced 69  QTC: 353    Assessment:  CAD SP CABG  Post-op Afib, now SR/ paced with PPM    PAST MEDICAL & SURGICAL HISTORY:  Myocardial infarction  HTN (hypertension)  SOB (shortness of breath)  Hypothyroid  Liver transplant status  Diabetes  H/O heart artery stent: X5  Pacemaker: medtronic- last interogated 1 m ago  Liver transplanted      PLAN:  Neuro: Pain control  Pulm: Encourage coughing, deep breathing and use of incentive spirometry. Wean off supplemental oxygen as able. Daily CXR.   Cardio: Monitor telemetry/alarms. Continue cardiac meds  GI: Tolerating diet. Continue stool softeners. Continue GI prophylaxis  Renal: monitor urine output, supplement electrolytes as needed  Vasc: Heparin SC/SCDs for DVT prophylaxis  Heme: Monitor H/H.   ID: Off antibiotics. Stable.  Endocrine: Monitor finger stick blood sugar and control hyperglycemia with insulin  Physical Therapy: OOB/ambulate        Discussed with Cardiothoracic Team at AM rounds.

## 2019-10-06 NOTE — PROGRESS NOTE ADULT - SUBJECTIVE AND OBJECTIVE BOX
OPERATIVE PROCEDURE(s): cabg x 5               POD # 4    SURGEON(s): [place]ia    SUBJECTIVE ASSESSMENT: pt is without complaints     Vital Signs Last 24 Hrs  T(C): 36.8 (06 Oct 2019 08:00), Max: 37 (05 Oct 2019 20:00)  T(F): 98.2 (06 Oct 2019 08:00), Max: 98.6 (05 Oct 2019 20:00)  HR: 80 (06 Oct 2019 14:00) (60 - 88)  BP: 137/69 (06 Oct 2019 14:00) (92/61 - 169/75)  BP(mean): 95 (06 Oct 2019 14:00) (70 - 122)  RR: 29 (06 Oct 2019 14:00) (18 - 33)  SpO2: 97% (06 Oct 2019 14:00) (93% - 100%)  10-05-19 @ 07:01  -  10-06-19 @ 07:00  --------------------------------------------------------  IN: 1135 mL / OUT: 971 mL / NET: 164 mL    10-06-19 @ 07:01  -  10-06-19 @ 15:49  --------------------------------------------------------  IN: 120 mL / OUT: 66 mL / NET: 54 mL    Physical Exam  General: alert and oriented x 3  Chest: pos crackles at left base  CVS: s1s2  Abd: pos bs soft nt  GI/ :  Ext: trace edema  incisions- c/d/i  sternum- intact    Central Venous Catheter: Yes[ x]  No[ ] , If Yes indication:           Day #    Temple Catheter: Yes  [ ] , No [x : If yes indication:                         Day #    NGT: Yes [ ] No [ x ]     If Yes Placement:                                          Day #    Post-Op Beta-Blockers: Yes [ x], No[ ], If No, then contraindication:    CHEST TUBE:  [ ] YES [x ] NO  OUTPUT:     per 24 hours    AIR LEAKS:  [ ] YES [ ] NO      EPICARDIAL WIRES:  [ ] YES [x ] NO      BOWEL MOVEMENT:  [x ] YES [ ] NO      LABS:                        10.4   10.20 )-----------( 140      ( 06 Oct 2019 02:00 )             31.9     COUMADIN:   [ ] YES [x ] NO    PT/INR - ( 06 Oct 2019 02:00 )   PT: 14.30 sec;   INR: 1.25 ratio         10-06    131<L>  |  99  |  45<H>  ----------------------------<  99  4.7   |  22  |  1.3    Ca    9.3      06 Oct 2019 02:00  Mg     2.7     10-06    TPro  5.9<L>  /  Alb  4.1  /  TBili  1.0  /  DBili  x   /  AST  33  /  ALT  14  /  AlkPhos  163<H>  10-06        MEDICATIONS  (STANDING):  aspirin 325 milliGRAM(s) Oral daily  atorvastatin 10 milliGRAM(s) Oral at bedtime  BACItracin   Ointment 1 Application(s) Topical two times a day  dextrose 5%. 1000 milliLiter(s) (50 mL/Hr) IV Continuous <Continuous>  dextrose 50% Injectable 50 milliLiter(s) IV Push every 15 minutes  dextrose 50% Injectable 25 milliLiter(s) IV Push every 15 minutes  docusate sodium 100 milliGRAM(s) Oral three times a day  famotidine    Tablet 20 milliGRAM(s) Oral two times a day  fluticasone propionate 50 MICROgram(s)/spray Nasal Spray 1 Spray(s) Both Nostrils two times a day  heparin  Injectable 5000 Unit(s) SubCutaneous every 8 hours  insulin glargine Injectable (LANTUS) 20 Unit(s) SubCutaneous every morning  insulin lispro (HumaLOG) corrective regimen sliding scale   SubCutaneous three times a day before meals  insulin lispro Injectable (HumaLOG) 4 Unit(s) SubCutaneous before breakfast  ipratropium    for Nebulization 500 MICROGram(s) Nebulizer every 6 hours  levothyroxine 112 MICROGram(s) Oral daily  magnesium sulfate  IVPB 1 Gram(s) IV Intermittent every 12 hours  meperidine     Injectable 25 milliGRAM(s) IV Push once  metoprolol tartrate 25 milliGRAM(s) Oral two times a day  polyethylene glycol 3350 17 Gram(s) Oral daily  senna 1 Tablet(s) Oral two times a day  tacrolimus 0.5 milliGRAM(s) Oral every 12 hours    MEDICATIONS  (PRN):  dextrose 40% Gel 15 Gram(s) Oral once PRN Blood Glucose LESS THAN 70 milliGRAM(s)/deciliter  glucagon  Injectable 1 milliGRAM(s) IntraMuscular once PRN Glucose LESS THAN 70 milligrams/deciliter  oxyCODONE    IR 10 milliGRAM(s) Oral every 4 hours PRN Severe Pain (7 - 10)      Allergies    No Known Drug Allergies  TEGADERM (Rash)    Intolerances        Ambulation/Activity Status:  ambulated well with pt       RADIOLOGY & ADDITIONAL TESTS:  Xray Chest 1 View- PORTABLE-Routine: AM   Indication: Shortness of Breath  Transport: Portable,  w/ Monitor  Provider's Contact #: [phone] (10-06-19 @ 09:20)    EXAM:  XR CHEST PORTABLE ROUTINE 1V            PROCEDURE DATE:  10/06/2019      INTERPRETATION:  Clinical History / Reason for exam: Post cardiac surgery   2, shortness of breath    Comparison : Chest radiograph 10/5/2019.    Technique/Positioning: Single portable AP radiograph the chest.    Findings:    Support devices: Left chest wall pacemaker. Right IJ sheath with tip in   SVC.    Cardiac/mediastinum/hilum: Unchanged    Lung parenchyma/Pleura: Bibasilar effusions/opacities appear unchanged.   No pneumothorax    Skeleton/soft tissues: Unchanged    Impression:    Bibasilar effusions/opacities appear unchanged. Overall, stable exam    Assessment/Plan: Patient is a 78 yo female s/p cabg pod # 4 with post op a. fib now in sr  cont present tx as per ct surgeon  post op a. fib d/c cardizem ; pt now in sr  diuresis  s/p liver transplant will follow daily lft's and consult gi as well as tacrolimus level-- dec dose as per GI  s/p cabg- start asa and statin and cont low dose lop  renal consult being pt has jelena and is on tacrolimus for transplant  d/c cordis  repeat bmp to check potassium and creat  Social Service Disposition:  home vs snf early next week

## 2019-10-07 LAB
ALBUMIN SERPL ELPH-MCNC: 3.7 G/DL — SIGNIFICANT CHANGE UP (ref 3.5–5.2)
ALP SERPL-CCNC: 127 U/L — HIGH (ref 30–115)
ALT FLD-CCNC: 12 U/L — SIGNIFICANT CHANGE UP (ref 0–41)
ANION GAP SERPL CALC-SCNC: 9 MMOL/L — SIGNIFICANT CHANGE UP (ref 7–14)
AST SERPL-CCNC: 17 U/L — SIGNIFICANT CHANGE UP (ref 0–41)
BASOPHILS # BLD AUTO: 0.02 K/UL — SIGNIFICANT CHANGE UP (ref 0–0.2)
BASOPHILS NFR BLD AUTO: 0.3 % — SIGNIFICANT CHANGE UP (ref 0–1)
BILIRUB SERPL-MCNC: 0.8 MG/DL — SIGNIFICANT CHANGE UP (ref 0.2–1.2)
BUN SERPL-MCNC: 42 MG/DL — HIGH (ref 10–20)
CALCIUM SERPL-MCNC: 9.4 MG/DL — SIGNIFICANT CHANGE UP (ref 8.5–10.1)
CHLORIDE SERPL-SCNC: 99 MMOL/L — SIGNIFICANT CHANGE UP (ref 98–110)
CO2 SERPL-SCNC: 22 MMOL/L — SIGNIFICANT CHANGE UP (ref 17–32)
CREAT SERPL-MCNC: 1.2 MG/DL — SIGNIFICANT CHANGE UP (ref 0.7–1.5)
EOSINOPHIL # BLD AUTO: 0.28 K/UL — SIGNIFICANT CHANGE UP (ref 0–0.7)
EOSINOPHIL NFR BLD AUTO: 3.9 % — SIGNIFICANT CHANGE UP (ref 0–8)
GLUCOSE BLDC GLUCOMTR-MCNC: 117 MG/DL — HIGH (ref 70–99)
GLUCOSE BLDC GLUCOMTR-MCNC: 153 MG/DL — HIGH (ref 70–99)
GLUCOSE BLDC GLUCOMTR-MCNC: 159 MG/DL — HIGH (ref 70–99)
GLUCOSE BLDC GLUCOMTR-MCNC: 97 MG/DL — SIGNIFICANT CHANGE UP (ref 70–99)
GLUCOSE SERPL-MCNC: 105 MG/DL — HIGH (ref 70–99)
HCT VFR BLD CALC: 28.5 % — LOW (ref 37–47)
HGB BLD-MCNC: 9.2 G/DL — LOW (ref 12–16)
IMM GRANULOCYTES NFR BLD AUTO: 0.6 % — HIGH (ref 0.1–0.3)
INR BLD: 1.34 RATIO — HIGH (ref 0.65–1.3)
LYMPHOCYTES # BLD AUTO: 1.06 K/UL — LOW (ref 1.2–3.4)
LYMPHOCYTES # BLD AUTO: 14.6 % — LOW (ref 20.5–51.1)
MAGNESIUM SERPL-MCNC: 3.1 MG/DL — CRITICAL HIGH (ref 1.8–2.4)
MCHC RBC-ENTMCNC: 28.9 PG — SIGNIFICANT CHANGE UP (ref 27–31)
MCHC RBC-ENTMCNC: 32.3 G/DL — SIGNIFICANT CHANGE UP (ref 32–37)
MCV RBC AUTO: 89.6 FL — SIGNIFICANT CHANGE UP (ref 81–99)
MONOCYTES # BLD AUTO: 0.83 K/UL — HIGH (ref 0.1–0.6)
MONOCYTES NFR BLD AUTO: 11.4 % — HIGH (ref 1.7–9.3)
NEUTROPHILS # BLD AUTO: 5.03 K/UL — SIGNIFICANT CHANGE UP (ref 1.4–6.5)
NEUTROPHILS NFR BLD AUTO: 69.2 % — SIGNIFICANT CHANGE UP (ref 42.2–75.2)
NRBC # BLD: 0 /100 WBCS — SIGNIFICANT CHANGE UP (ref 0–0)
PLATELET # BLD AUTO: 151 K/UL — SIGNIFICANT CHANGE UP (ref 130–400)
POTASSIUM SERPL-MCNC: 4.7 MMOL/L — SIGNIFICANT CHANGE UP (ref 3.5–5)
POTASSIUM SERPL-SCNC: 4.7 MMOL/L — SIGNIFICANT CHANGE UP (ref 3.5–5)
PROT SERPL-MCNC: 5.4 G/DL — LOW (ref 6–8)
PROTHROM AB SERPL-ACNC: 15.4 SEC — HIGH (ref 9.95–12.87)
RBC # BLD: 3.18 M/UL — LOW (ref 4.2–5.4)
RBC # FLD: 13.7 % — SIGNIFICANT CHANGE UP (ref 11.5–14.5)
SODIUM SERPL-SCNC: 130 MMOL/L — LOW (ref 135–146)
TACROLIMUS SERPL-MCNC: 11.2 NG/ML — SIGNIFICANT CHANGE UP
TACROLIMUS SERPL-MCNC: 6.2 NG/ML — SIGNIFICANT CHANGE UP
WBC # BLD: 7.26 K/UL — SIGNIFICANT CHANGE UP (ref 4.8–10.8)
WBC # FLD AUTO: 7.26 K/UL — SIGNIFICANT CHANGE UP (ref 4.8–10.8)

## 2019-10-07 PROCEDURE — 71045 X-RAY EXAM CHEST 1 VIEW: CPT | Mod: 26

## 2019-10-07 PROCEDURE — 93010 ELECTROCARDIOGRAM REPORT: CPT

## 2019-10-07 PROCEDURE — 99233 SBSQ HOSP IP/OBS HIGH 50: CPT

## 2019-10-07 PROCEDURE — 99232 SBSQ HOSP IP/OBS MODERATE 35: CPT

## 2019-10-07 RX ORDER — FUROSEMIDE 40 MG
40 TABLET ORAL ONCE
Refills: 0 | Status: COMPLETED | OUTPATIENT
Start: 2019-10-07 | End: 2019-10-07

## 2019-10-07 RX ORDER — DILTIAZEM HCL 120 MG
10 CAPSULE, EXT RELEASE 24 HR ORAL
Qty: 125 | Refills: 0 | Status: DISCONTINUED | OUTPATIENT
Start: 2019-10-07 | End: 2019-10-07

## 2019-10-07 RX ORDER — DILTIAZEM HCL 120 MG
5 CAPSULE, EXT RELEASE 24 HR ORAL
Qty: 125 | Refills: 0 | Status: DISCONTINUED | OUTPATIENT
Start: 2019-10-07 | End: 2019-10-07

## 2019-10-07 RX ORDER — AMIODARONE HYDROCHLORIDE 400 MG/1
0.5 TABLET ORAL
Qty: 900 | Refills: 0 | Status: DISCONTINUED | OUTPATIENT
Start: 2019-10-07 | End: 2019-10-07

## 2019-10-07 RX ORDER — DIGOXIN 250 MCG
0.5 TABLET ORAL ONCE
Refills: 0 | Status: COMPLETED | OUTPATIENT
Start: 2019-10-07 | End: 2019-10-07

## 2019-10-07 RX ORDER — AMIODARONE HYDROCHLORIDE 400 MG/1
1 TABLET ORAL
Qty: 900 | Refills: 0 | Status: DISCONTINUED | OUTPATIENT
Start: 2019-10-07 | End: 2019-10-07

## 2019-10-07 RX ORDER — WARFARIN SODIUM 2.5 MG/1
2 TABLET ORAL ONCE
Refills: 0 | Status: COMPLETED | OUTPATIENT
Start: 2019-10-07 | End: 2019-10-07

## 2019-10-07 RX ORDER — AMIODARONE HYDROCHLORIDE 400 MG/1
150 TABLET ORAL ONCE
Refills: 0 | Status: COMPLETED | OUTPATIENT
Start: 2019-10-07 | End: 2019-10-07

## 2019-10-07 RX ORDER — DILTIAZEM HCL 120 MG
10 CAPSULE, EXT RELEASE 24 HR ORAL ONCE
Refills: 0 | Status: COMPLETED | OUTPATIENT
Start: 2019-10-07 | End: 2019-10-07

## 2019-10-07 RX ORDER — DILTIAZEM HCL 120 MG
30 CAPSULE, EXT RELEASE 24 HR ORAL EVERY 6 HOURS
Refills: 0 | Status: DISCONTINUED | OUTPATIENT
Start: 2019-10-07 | End: 2019-10-10

## 2019-10-07 RX ADMIN — AMIODARONE HYDROCHLORIDE 33.33 MG/MIN: 400 TABLET ORAL at 09:52

## 2019-10-07 RX ADMIN — OXYCODONE HYDROCHLORIDE 10 MILLIGRAM(S): 5 TABLET ORAL at 00:24

## 2019-10-07 RX ADMIN — SENNA PLUS 1 TABLET(S): 8.6 TABLET ORAL at 05:16

## 2019-10-07 RX ADMIN — Medication 30 MILLIGRAM(S): at 20:22

## 2019-10-07 RX ADMIN — TACROLIMUS 0.5 MILLIGRAM(S): 5 CAPSULE ORAL at 17:38

## 2019-10-07 RX ADMIN — Medication 1 SPRAY(S): at 17:37

## 2019-10-07 RX ADMIN — Medication 112 MICROGRAM(S): at 05:16

## 2019-10-07 RX ADMIN — POLYETHYLENE GLYCOL 3350 17 GRAM(S): 17 POWDER, FOR SOLUTION ORAL at 11:05

## 2019-10-07 RX ADMIN — INSULIN GLARGINE 20 UNIT(S): 100 INJECTION, SOLUTION SUBCUTANEOUS at 08:13

## 2019-10-07 RX ADMIN — Medication 325 MILLIGRAM(S): at 11:04

## 2019-10-07 RX ADMIN — Medication 5 MG/HR: at 06:07

## 2019-10-07 RX ADMIN — Medication 25 MILLIGRAM(S): at 05:19

## 2019-10-07 RX ADMIN — HEPARIN SODIUM 5000 UNIT(S): 5000 INJECTION INTRAVENOUS; SUBCUTANEOUS at 05:16

## 2019-10-07 RX ADMIN — FAMOTIDINE 20 MILLIGRAM(S): 10 INJECTION INTRAVENOUS at 05:16

## 2019-10-07 RX ADMIN — Medication 10 MILLIGRAM(S): at 05:30

## 2019-10-07 RX ADMIN — Medication 100 MILLIGRAM(S): at 05:16

## 2019-10-07 RX ADMIN — Medication 1: at 11:16

## 2019-10-07 RX ADMIN — TACROLIMUS 0.5 MILLIGRAM(S): 5 CAPSULE ORAL at 05:16

## 2019-10-07 RX ADMIN — Medication 1: at 15:41

## 2019-10-07 RX ADMIN — HEPARIN SODIUM 5000 UNIT(S): 5000 INJECTION INTRAVENOUS; SUBCUTANEOUS at 13:24

## 2019-10-07 RX ADMIN — AMIODARONE HYDROCHLORIDE 618 MILLIGRAM(S): 400 TABLET ORAL at 08:45

## 2019-10-07 RX ADMIN — Medication 10 MG/HR: at 13:29

## 2019-10-07 RX ADMIN — FAMOTIDINE 20 MILLIGRAM(S): 10 INJECTION INTRAVENOUS at 17:37

## 2019-10-07 RX ADMIN — Medication 1 APPLICATION(S): at 05:17

## 2019-10-07 RX ADMIN — ATORVASTATIN CALCIUM 10 MILLIGRAM(S): 80 TABLET, FILM COATED ORAL at 21:29

## 2019-10-07 RX ADMIN — HEPARIN SODIUM 5000 UNIT(S): 5000 INJECTION INTRAVENOUS; SUBCUTANEOUS at 21:29

## 2019-10-07 RX ADMIN — Medication 40 MILLIGRAM(S): at 08:45

## 2019-10-07 RX ADMIN — Medication 25 MILLIGRAM(S): at 17:37

## 2019-10-07 RX ADMIN — WARFARIN SODIUM 2 MILLIGRAM(S): 2.5 TABLET ORAL at 21:52

## 2019-10-07 RX ADMIN — OXYCODONE HYDROCHLORIDE 10 MILLIGRAM(S): 5 TABLET ORAL at 23:28

## 2019-10-07 RX ADMIN — SENNA PLUS 1 TABLET(S): 8.6 TABLET ORAL at 17:37

## 2019-10-07 RX ADMIN — Medication 100 MILLIGRAM(S): at 13:24

## 2019-10-07 RX ADMIN — Medication 1 APPLICATION(S): at 17:37

## 2019-10-07 RX ADMIN — Medication 208 MILLIGRAM(S): at 09:16

## 2019-10-07 RX ADMIN — OXYCODONE HYDROCHLORIDE 10 MILLIGRAM(S): 5 TABLET ORAL at 00:54

## 2019-10-07 RX ADMIN — Medication 100 MILLIGRAM(S): at 21:29

## 2019-10-07 NOTE — PROGRESS NOTE ADULT - SUBJECTIVE AND OBJECTIVE BOX
Gastroenterology progress note:     Patient is a 79y old  Female who presents with a chief complaint of cad (07 Oct 2019 08:22)       Admitted on: 10-01-19    We are following the patient for: history of liver transplant , patient is post CABG and had AUDRA     Interval History: patient had atrial fibrillation and started on amiodarone , patient denies any abdominal pain .     Patient's medical problems are  stable     History obtained from someone other than patient (Y/N): N      PAST MEDICAL & SURGICAL HISTORY:  Myocardial infarction  HTN (hypertension)  SOB (shortness of breath)  Hypothyroid  Liver transplant status  Diabetes  H/O heart artery stent: X5  Pacemaker: Medgenicstronic- last interogated 1 m ago  Liver transplanted      MEDICATIONS  (STANDING):  amiodarone Infusion 1 mG/Min (33.333 mL/Hr) IV Continuous <Continuous>  amiodarone Infusion 0.5 mG/Min (16.667 mL/Hr) IV Continuous <Continuous>  aspirin 325 milliGRAM(s) Oral daily  atorvastatin 10 milliGRAM(s) Oral at bedtime  BACItracin   Ointment 1 Application(s) Topical two times a day  dextrose 5%. 1000 milliLiter(s) (50 mL/Hr) IV Continuous <Continuous>  dextrose 50% Injectable 50 milliLiter(s) IV Push every 15 minutes  dextrose 50% Injectable 25 milliLiter(s) IV Push every 15 minutes  docusate sodium 100 milliGRAM(s) Oral three times a day  famotidine    Tablet 20 milliGRAM(s) Oral two times a day  fluticasone propionate 50 MICROgram(s)/spray Nasal Spray 1 Spray(s) Both Nostrils two times a day  heparin  Injectable 5000 Unit(s) SubCutaneous every 8 hours  insulin glargine Injectable (LANTUS) 20 Unit(s) SubCutaneous every morning  insulin lispro (HumaLOG) corrective regimen sliding scale   SubCutaneous three times a day before meals  insulin lispro Injectable (HumaLOG) 4 Unit(s) SubCutaneous before breakfast  ipratropium    for Nebulization 500 MICROGram(s) Nebulizer every 6 hours  levothyroxine 112 MICROGram(s) Oral daily  magnesium sulfate  IVPB 1 Gram(s) IV Intermittent every 12 hours  meperidine     Injectable 25 milliGRAM(s) IV Push once  metoprolol tartrate 25 milliGRAM(s) Oral two times a day  polyethylene glycol 3350 17 Gram(s) Oral daily  senna 1 Tablet(s) Oral two times a day  tacrolimus 0.5 milliGRAM(s) Oral every 12 hours    MEDICATIONS  (PRN):  dextrose 40% Gel 15 Gram(s) Oral once PRN Blood Glucose LESS THAN 70 milliGRAM(s)/deciliter  glucagon  Injectable 1 milliGRAM(s) IntraMuscular once PRN Glucose LESS THAN 70 milligrams/deciliter  oxyCODONE    IR 10 milliGRAM(s) Oral every 4 hours PRN Severe Pain (7 - 10)      Allergies  No Known Drug Allergies  TEGADERM (Rash)      Review of Systems:   Cardiovascular:  No Chest Pain, No Palpitations  Respiratory:  No Cough, No Dyspnea  Gastrointestinal:  As described in HPI    Physical Examination:  T(C): 36.1 (10-07-19 @ 07:49), Max: 37.2 (10-06-19 @ 16:00)  HR: 75 (10-07-19 @ 11:00) (60 - 138)  BP: 133/58 (10-07-19 @ 11:00) (92/61 - 156/66)  RR: 22 (10-07-19 @ 11:00) (18 - 35)  SpO2: 100% (10-07-19 @ 11:00) (93% - 100%)      10-06-19 @ 07:01  -  10-07-19 @ 07:00  --------------------------------------------------------  IN: 365 mL / OUT: 1035 mL / NET: -670 mL    10-07-19 @ 07:01  -  10-07-19 @ 12:14  --------------------------------------------------------  IN: 586.6 mL / OUT: 400 mL / NET: 186.6 mL      Constitutional: No acute distress.  Respiratory:  No signs of respiratory distress. Lung sounds are clear bilaterally.  Cardiovascular:  S1 S2, Regular rate and rhythm.  Abdominal: Abdomen is soft, symmetric, and non-tender without distention. Bowel sounds are present and normoactive in all four quadrants. No masses, hepatomegaly, or splenomegaly are noted.   Skin: No rashes, No Jaundice.        Data: (reviewed by attending)                        9.2    7.26  )-----------( 151      ( 07 Oct 2019 02:00 )             28.5     Hgb trend:  9.2  10-07-19 @ 02:00  10.4  10-06-19 @ 02:00  10.2  10-05-19 @ 02:00      10-04-19 @ 07:01  -  10-05-19 @ 07:00  --------------------------------------------------------  IN: 500 mL      10-07    130<L>  |  99  |  42<H>  ----------------------------<  105<H>  4.7   |  22  |  1.2    Ca    9.4      07 Oct 2019 02:00  Mg     3.1     10-07    TPro  5.4<L>  /  Alb  3.7  /  TBili  0.8  /  DBili  x   /  AST  17  /  ALT  12  /  AlkPhos  127<H>  10-07    Liver panel trend:  TBili 0.8   /   AST 17   /   ALT 12   /   AlkP 127   /   Tptn 5.4   /   Alb 3.7    /   DBili --      10-07  TBili 1.0   /   AST 33   /   ALT 14   /   AlkP 163   /   Tptn 5.9   /   Alb 4.1    /   DBili --      10-06  TBili 1.2   /   AST 26   /   ALT 13   /   AlkP 109   /   Tptn 5.8   /   Alb 4.2    /   DBili --      10-05  TBili 1.2   /   AST 33   /   ALT 12   /   AlkP 108   /   Tptn 6.0   /   Alb 4.4    /   DBili --      10-04  TBili 1.4   /   AST 38   /   ALT 16   /   AlkP 66   /   Tptn 5.9   /   Alb 4.5    /   DBili --      10-04  TBili 1.3   /   AST 47   /   ALT 22   /   AlkP 40   /   Tptn 5.8   /   Alb 4.8    /   DBili --      10-03  TBili 1.2   /      /   ALT 38   /   AlkP 56   /   Tptn 5.3   /   Alb 3.9    /   DBili --      10-02  TBili 0.7   /   AST 40   /   ALT 17   /   AlkP 82   /   Tptn 7.1   /   Alb 4.0    /   DBili --      10-02  TBili 0.3   /   AST 19   /   ALT 12   /   AlkP 85   /   Tptn 6.5   /   Alb 3.8    /   DBili --      10-01      PT/INR - ( 06 Oct 2019 02:00 )   PT: 14.30 sec;   INR: 1.25 ratio                Radiology: (reviewed by attending)

## 2019-10-07 NOTE — PROGRESS NOTE ADULT - SUBJECTIVE AND OBJECTIVE BOX
CTU Attending Progress Daily Note     07 Oct 2019 10:32  POD# - 5  He has history of Myocardial infarction  HTN (hypertension)  SOB (shortness of breath)  Hypothyroid  Liver transplant status  Diabetes    Interval event for past 24 hr:  NEPTALI ANNA  79y back in A fib   Current Complains:  NEPTALI ANNA has no new complains  HPI:    OBJECTIVE:  ICU Vital Signs Last 24 Hrs  T(C): 36.1 (07 Oct 2019 07:49), Max: 37.2 (06 Oct 2019 12:00)  T(F): 97 (07 Oct 2019 07:49), Max: 98.9 (06 Oct 2019 12:00)  HR: 91 (07 Oct 2019 10:00) (60 - 138)  BP: 151/97 (07 Oct 2019 10:00) (92/61 - 169/75)  BP(mean): 116 (07 Oct 2019 10:00) (72 - 116)  ABP: --  ABP(mean): --  RR: 20 (07 Oct 2019 10:00) (18 - 35)  SpO2: 99% (07 Oct 2019 10:00) (93% - 99%)    I&O's Summary    06 Oct 2019 07:01  -  07 Oct 2019 07:00  --------------------------------------------------------  IN: 365 mL / OUT: 1035 mL / NET: -670 mL    07 Oct 2019 07:01  -  07 Oct 2019 10:32  --------------------------------------------------------  IN: 553.3 mL / OUT: 400 mL / NET: 153.3 mL      I&O's Detail    06 Oct 2019 07:01  -  07 Oct 2019 07:00  --------------------------------------------------------  IN:    diltiazem Infusion: 5 mL    Oral Fluid: 360 mL  Total IN: 365 mL    OUT:    Indwelling Catheter - Urethral: 435 mL    Voided: 600 mL  Total OUT: 1035 mL    Total NET: -670 mL      07 Oct 2019 07:01  -  07 Oct 2019 10:32  --------------------------------------------------------  IN:    amiodarone Infusion: 33.3 mL    diltiazem Infusion: 10 mL    IV PiggyBack: 150 mL    Oral Fluid: 360 mL  Total IN: 553.3 mL    OUT:    Voided: 400 mL  Total OUT: 400 mL    Total NET: 153.3 mL          CAPILLARY BLOOD GLUCOSE      POCT Blood Glucose.: 97 mg/dL (07 Oct 2019 06:35)  POCT Blood Glucose.: 135 mg/dL (06 Oct 2019 21:21)  POCT Blood Glucose.: 109 mg/dL (06 Oct 2019 18:20)  POCT Blood Glucose.: 175 mg/dL (06 Oct 2019 11:18)    LABS:                          9.2    7.26  )-----------( 151      ( 07 Oct 2019 02:00 )             28.5     10-07    130<L>  |  99  |  42<H>  ----------------------------<  105<H>  4.7   |  22  |  1.2    Ca    9.4      07 Oct 2019 02:00  Mg     3.1     10-07    TPro  5.4<L>  /  Alb  3.7  /  TBili  0.8  /  DBili  x   /  AST  17  /  ALT  12  /  AlkPhos  127<H>  10-07    PT/INR - ( 06 Oct 2019 02:00 )   PT: 14.30 sec;   INR: 1.25 ratio               Home Medications:  Ambien 5 mg oral tablet: 1 tab(s) orally once a day (at bedtime) (24 Sep 2019 10:23)  aspirin 81 mg oral tablet: 1 tab(s) orally once a day (24 Sep 2019 10:23)  insulin aspart: 4 UNITS subcutaneous 3 times a day (before meals) BASED ON BLOOD SUGAR (24 Sep 2019 10:23)  Lantus: 18 unit(s) subcutaneous once a day (at bedtime) (24 Sep 2019 10:23)  Lopressor 50 mg oral tablet: 1 tab(s) orally 2 times a day (24 Sep 2019 10:23)  NIFEdipine 90 mg oral tablet, extended release: 1 tab(s) orally once a day (24 Sep 2019 10:23)  Prograf 1 mg oral capsule: 1 cap(s) orally every 12 hours (24 Sep 2019 10:23)  Synthroid 112 mcg (0.112 mg) oral tablet: 1 tab(s) orally once a day (24 Sep 2019 10:23)    HOSPITAL MEDICATIONS:  MEDICATIONS  (STANDING):  amiodarone Infusion 1 mG/Min (33.333 mL/Hr) IV Continuous <Continuous>  amiodarone Infusion 0.5 mG/Min (16.667 mL/Hr) IV Continuous <Continuous>  aspirin 325 milliGRAM(s) Oral daily  atorvastatin 10 milliGRAM(s) Oral at bedtime  BACItracin   Ointment 1 Application(s) Topical two times a day  dextrose 5%. 1000 milliLiter(s) (50 mL/Hr) IV Continuous <Continuous>  dextrose 50% Injectable 50 milliLiter(s) IV Push every 15 minutes  dextrose 50% Injectable 25 milliLiter(s) IV Push every 15 minutes  docusate sodium 100 milliGRAM(s) Oral three times a day  famotidine    Tablet 20 milliGRAM(s) Oral two times a day  fluticasone propionate 50 MICROgram(s)/spray Nasal Spray 1 Spray(s) Both Nostrils two times a day  heparin  Injectable 5000 Unit(s) SubCutaneous every 8 hours  insulin glargine Injectable (LANTUS) 20 Unit(s) SubCutaneous every morning  insulin lispro (HumaLOG) corrective regimen sliding scale   SubCutaneous three times a day before meals  insulin lispro Injectable (HumaLOG) 4 Unit(s) SubCutaneous before breakfast  ipratropium    for Nebulization 500 MICROGram(s) Nebulizer every 6 hours  levothyroxine 112 MICROGram(s) Oral daily  magnesium sulfate  IVPB 1 Gram(s) IV Intermittent every 12 hours  meperidine     Injectable 25 milliGRAM(s) IV Push once  metoprolol tartrate 25 milliGRAM(s) Oral two times a day  polyethylene glycol 3350 17 Gram(s) Oral daily  senna 1 Tablet(s) Oral two times a day  tacrolimus 0.5 milliGRAM(s) Oral every 12 hours    MEDICATIONS  (PRN):  dextrose 40% Gel 15 Gram(s) Oral once PRN Blood Glucose LESS THAN 70 milliGRAM(s)/deciliter  glucagon  Injectable 1 milliGRAM(s) IntraMuscular once PRN Glucose LESS THAN 70 milligrams/deciliter  oxyCODONE    IR 10 milliGRAM(s) Oral every 4 hours PRN Severe Pain (7 - 10)      REVIEW OF SYSTEMS:  CONSTITUTIONAL: [X] all negative; [ ] weakness, [ ] fevers, [ ] chills  EYES/ENT: [X] all negative; [ ] visual changes, [ ] vertigo, [ ] throat pain   NECK: [X] all negative; [ ] pain, [ ] stiffness  RESPIRATORY: [] all negative, [ ] cough, [ ] wheezing, [ ] hemoptysis, [ ] shortness of breath  CARDIOVASCULAR: [] all negative; [ ] chest pain, [ ] palpitations, [ ] orthopnea  GASTROINTESTINAL: [X] all negative; [ ]abdominal pain, [ ] nausea, [ ] vomiting, [ ] hematemesis, [ ] diarrhea, [ ] constipation, [ ] melena, [ ] hematochezia.  GENITOURINARY: [X] all negative; [ ] dysuria, [ ] frequency, [ ] hematuria  NEUROLOGICAL: [X] all negative; [ ] numbness, [ ] weakness  SKIN: [X] all negative; [ ] itching, [ ] burning, [ ] rashes, [ ] lesions   All other review of systems is negative unless indicated above.    [  ] Unable to assess ROS because     PHYSICAL EXAM:          CONSTITUTIONAL: Well-developed; well-nourished; in no acute distress.   	SKIN: warm, dry  	HEAD: Normocephalic; atraumatic.  	EYES: PERRL, EOM, no conj injection, sclera clear  	ENT: No nasal discharge; airway clear.  	NECK: Supple; non tender.  No midline ttp ctls  	CARD: S1, S2 normal; no murmurs, gallops, or rubs. Regular rate and rhythm. 2+ RPs and DPs bilat, no carotid bruits, no pedal   edema, no calf pain b/l  	RESP: CTA  bilat crackes   	ABD: Soft, not tender, not distended, no CVA ttp no rebound or guarding, bowel sounds present  	EXT: Normal ROM.  No clubbing, cyanosis or edema.   	  	NEURO: Alert, awake, motor 5/5 R, 5/5 L        RADIOLOGY:  xray    mild fluid congestion

## 2019-10-07 NOTE — PROGRESS NOTE ADULT - ASSESSMENT
Assessment/Plan:  CAD-s/p CABG x 5-POD #5  1-BP control- beta-blockers  2-serum glucose control-insulin infusion d/c change to lantus and novlog  3-acute blood loss anemia-transfused 2 units pRBCs-f/u repeat CBC  3-kwzyvwvuhscrajiu-pbifaq, continue to monitor plts daily  5-hx liver transplant- Tacrolimus    5-ovfqrtaozih-ujqyimkw synthroid  7- new A fib cardiazm stated to slow Vent response    pt refused cardioversion again  discussed with GI ok to start amio drip and d/c on amio with LFT f/u     - pulmonary congestion lasix 40 x1 given

## 2019-10-07 NOTE — CHART NOTE - NSCHARTNOTEFT_GEN_A_CORE
Pt was seen by Dr Watson of GI who does not feel the patient should be receiving Amio due to it's liver toxicity; hence, the Amio was stopped and Cardizem was restarted.

## 2019-10-07 NOTE — PROGRESS NOTE ADULT - ASSESSMENT
78 yo female with CAD s/p PCI x 5 (done in [place])  over may years (LAD/LCX/OM), last PCI> 5 years ago, PPM ([facility]) complicated by pericardial tamponade s/p pericardiocentesis ([place]) presenting for dyspnea on exertion. pt with PMH of hep B complicated by HCC s/p liver transplant > 10 year ago. pt was found to have 3 vessels disease on cardiac cath.  patient is day 3 post CABG     # Reported Hepatitis B infection complicated by HCC s/p liver transplant >10 year ago:    LFTs normal except for mild elevation in alkaline phosphatase , which is trending down   -patient developed AUDRA with creatinine from 0.9 to 1.5 ,  the dosage of tacrolimus decreased to 0.5 mg twice daily , when creatinine is back to baseline , please resume tacrolimus 1 mg twice daily  -please check tacrolimus  trough level prior to discharge   - avoid hepatotoxic drugs, avoid hypotension  - please be wary of medications with any interactions with Tacrolimus (avoid inducers and inhibitors of CY)  -Please check hepatitis B serology to clarify hep B status and further management if needed.-  -workup for renal failure and optimization of kidney function  - there is no specific recommendation to use certain medications to control atrial fibrillation, just please avoid hepatotoxic drugs 80 yo female with CAD s/p PCI x 5 (done in [place])  over may years (LAD/LCX/OM), last PCI> 5 years ago, PPM ([facility]) complicated by pericardial tamponade s/p pericardiocentesis ([place]) presenting for dyspnea on exertion. pt with PMH of hep B complicated by HCC s/p liver transplant > 10 year ago. pt was found to have 3 vessels disease on cardiac cath.  patient is day 3 post CABG     # Reported Hepatitis B infection complicated by HCC s/p liver transplant >10 year ago:    LFTs normal except for mild elevation in alkaline phosphatase , which is trending down , trend LFTs  and INR  -patient developed AUDRA  ,  the dosage of tacrolimus decreased to 0.5 mg twice daily , when creatinine is back to baseline , please resume tacrolimus 1 mg twice daily  -please check tacrolimus  trough level prior to discharge   - please be wary of medications with any interactions with Tacrolimus (avoid inducers and inhibitors of CY)  -Please check hepatitis B serology to clarify hep B status and further management if needed.  -workup for renal failure and optimization of kidney function , please f/u with nephrology  - please avoid amiodarone given its hepatotoxicity profile , and use more safe alternative medicine for rate control  - avoid hepatotoxic drugs, avoid hypotension

## 2019-10-07 NOTE — CONSULT NOTE ADULT - SUBJECTIVE AND OBJECTIVE BOX
NEPHROLOGY CONSULTATION NOTE    Patient is a 79y Female whom presented to the hospital for HESS.   78 yo female with CAD s/p PCI x 5 (done in [place])  over may years (LAD/LCX/OM), last PCI> 5 years ago, PPM ([facility]) complicated by pericardial tamponade s/p pericardiocentesis ([place]) presenting for dyspnea on exertion. pt with PMH of hep B complicated by HCC s/p liver transplant > 10 year ago. pt was found to have 3 vessels disease on cardiac cath.   Pt had CABG 3-4 days ago, noted to have a rise in creat, hyponatremia. LAsix started today. c/o SOB, not aware of kidney problems in .  PAST MEDICAL & SURGICAL HISTORY:  Myocardial infarction  HTN (hypertension)  SOB (shortness of breath)  Hypothyroid  Liver transplant status  Diabetes  H/O heart artery stent: X5  Pacemaker: medtronic- last interogated 1 m ago  Liver transplanted    Allergies:  No Known Drug Allergies  TEGADERM (Rash)    Home Medications Reviewed  Hospital Medications:   MEDICATIONS  (STANDING):  aspirin 325 milliGRAM(s) Oral daily  atorvastatin 10 milliGRAM(s) Oral at bedtime  BACItracin   Ointment 1 Application(s) Topical two times a day  dextrose 5%. 1000 milliLiter(s) (50 mL/Hr) IV Continuous <Continuous>  dextrose 50% Injectable 50 milliLiter(s) IV Push every 15 minutes  dextrose 50% Injectable 25 milliLiter(s) IV Push every 15 minutes  diltiazem Infusion 10 mG/Hr (10 mL/Hr) IV Continuous <Continuous>  docusate sodium 100 milliGRAM(s) Oral three times a day  famotidine    Tablet 20 milliGRAM(s) Oral two times a day  fluticasone propionate 50 MICROgram(s)/spray Nasal Spray 1 Spray(s) Both Nostrils two times a day  heparin  Injectable 5000 Unit(s) SubCutaneous every 8 hours  insulin glargine Injectable (LANTUS) 20 Unit(s) SubCutaneous every morning  insulin lispro (HumaLOG) corrective regimen sliding scale   SubCutaneous three times a day before meals  insulin lispro Injectable (HumaLOG) 4 Unit(s) SubCutaneous before breakfast  ipratropium    for Nebulization 500 MICROGram(s) Nebulizer every 6 hours  levothyroxine 112 MICROGram(s) Oral daily  magnesium sulfate  IVPB 1 Gram(s) IV Intermittent every 12 hours  meperidine     Injectable 25 milliGRAM(s) IV Push once  metoprolol tartrate 25 milliGRAM(s) Oral two times a day  polyethylene glycol 3350 17 Gram(s) Oral daily  senna 1 Tablet(s) Oral two times a day  tacrolimus 0.5 milliGRAM(s) Oral every 12 hours      SOCIAL HISTORY:  Denies ETOH,Smoking,   FAMILY HISTORY:  Family history of early CAD  FH: cancer        REVIEW OF SYSTEMS:  CONSTITUTIONAL: No weakness, fevers or chills  RESPIRATORY: No cough, wheezing, hemoptysis; Has shortness of breath  CARDIOVASCULAR: No chest pain or palpitations.  GASTROINTESTINAL: No abdominal or epigastric pain. No nausea, vomiting,   GENITOURINARY: No dysuria, frequency,  or hematuria  NEUROLOGICAL: No numbness or weakness  SKIN: No itching, burning, rashes, or lesions   VASCULAR: 1+ bilateral lower extremity edema.   All other review of systems is negative unless indicated above.    VITALS:  T(F): 97.7 (10-07-19 @ 12:00), Max: 98 (10-07-19 @ 04:00)  HR: 65 (10-07-19 @ 15:00)  BP: 130/76 (10-07-19 @ 15:00)  RR: 18 (10-07-19 @ 12:00)  SpO2: 96% (10-07-19 @ 15:00)    10-06 @ 07:01  -  10-07 @ 07:00  --------------------------------------------------------  IN: 365 mL / OUT: 1035 mL / NET: -670 mL    10-07 @ 07:01  -  10-07 @ 16:33  --------------------------------------------------------  IN: 866.6 mL / OUT: 700 mL / NET: 166.6 mL          10-06-19 @ 07:01  -  10-07-19 @ 07:00  --------------------------------------------------------  IN: 0 mL / OUT: 435 mL / NET: -435 mL      I&O's Detail    06 Oct 2019 07:  -  07 Oct 2019 07:00  --------------------------------------------------------  IN:    diltiazem Infusion: 5 mL    Oral Fluid: 360 mL  Total IN: 365 mL    OUT:    Indwelling Catheter - Urethral: 435 mL    Voided: 600 mL  Total OUT: 1035 mL    Total NET: -670 mL      07 Oct 2019 07:  -  07 Oct 2019 16:33  --------------------------------------------------------  IN:    amiodarone Infusion: 66.6 mL    diltiazem Infusion: 40 mL    diltiazem Infusion: 10 mL    IV PiggyBack: 150 mL    Oral Fluid: 600 mL  Total IN: 866.6 mL    OUT:    Voided: 700 mL  Total OUT: 700 mL    Total NET: 166.6 mL            PHYSICAL EXAM:  Constitutional: NAD  HEENT: anicteric sclera, MMM  Neck: No JVD  Respiratory: Lt basilar crackles  Cardiovascular: S1, S2, RRR  Gastrointestinal: BS+, soft, NT/ND  Extremities: 1+ peripheral edema  Neurological: A/O x 3, no focal deficits  Psychiatric: Normal mood, normal affect  : No CVA tenderness. No suarez.   Skin: No rashes  Vascular Access:    LABS:  10-07    130<L>  |  99  |  42<H>  ----------------------------<  105<H>  4.7   |  22  |  1.2    Ca    9.4      07 Oct 2019 02:00  Mg     3.1     10-    TPro  5.4<L>  /  Alb  3.7  /  TBili  0.8  /  DBili      /  AST  17  /  ALT  12  /  AlkPhos  127<H>  10-07    Creatinine Trend: 1.2 <--, 1.3 <--, 1.5 <--, 1.5 <--, 1.5 <--, 1.3 <--, 0.9 <--, 0.8 <--, 0.9 <--, 0.9 <--, 0.9 <--                        9.2    7.26  )-----------( 151      ( 07 Oct 2019 02:00 )             28.5     Urine Studies:  Urinalysis Basic - ( 02 Oct 2019 00:00 )    Color: Colorless / Appearance: Clear / S.010 / pH:   Gluc:  / Ketone: Negative  / Bili: Negative / Urobili: <2 mg/dL   Blood:  / Protein: Trace / Nitrite: Negative   Leuk Esterase: Negative / RBC:  / WBC    Sq Epi:  / Non Sq Epi:  / Bacteria:                 RADIOLOGY & ADDITIONAL STUDIES:    < from: Xray Chest 1 View- PORTABLE-Routine (10.07.19 @ 04:05) >    Increasing bibasilar opacities and effusions.      < end of copied text >

## 2019-10-07 NOTE — CONSULT NOTE ADULT - ASSESSMENT
Pt with CAD s/p CABG, HTN, DM, s/p liver Tx>10 trs ago, developed AUDRA post op and fluid overload.    AUDRA - oliguric, post cardiac sx  creat 0.8->1.5, Temple d/dolly yesterday  - please check Bladder Sono for PVR  - repeat UA, Urine Na creat    Hyponatremia due to hypervolemia - agree with Lasix 40 mg IV 1-2x daily  check TSH, Urine OSm, serum OSm    s/p Liver TX for reported Hep B infection with HCC  - check TAcrolimus trough level-1/2 hr prior to the AM dose of Prograf    D/W Primary team  Will follow   Thank you

## 2019-10-07 NOTE — PROGRESS NOTE ADULT - SUBJECTIVE AND OBJECTIVE BOX
OPERATIVE PROCEDURE(s):    cabg x 5            POD # 5    SURGEON(s): virginia    SUBJECTIVE ASSESSMENT:    Vital Signs Last 24 Hrs  T(C): 36.1 (07 Oct 2019 07:49), Max: 37.2 (06 Oct 2019 12:00)  T(F): 97 (07 Oct 2019 07:49), Max: 98.9 (06 Oct 2019 12:00)  HR: 84 (07 Oct 2019 07:49) (60 - 138)  BP: 134/59 (07 Oct 2019 07:49) (92/61 - 169/75)  BP(mean): 85 (07 Oct 2019 07:49) (70 - 110)  RR: 22 (07 Oct 2019 07:49) (18 - 35)  SpO2: 95% (07 Oct 2019 07:49) (93% - 98%)  10-06-19 @ 07:01  -  10-07-19 @ 07:00  --------------------------------------------------------  IN: 365 mL / OUT: 1035 mL / NET: -670 mL    Physical Exam  General: alert and oriented x 3  Chest: pos crackles at left base  CVS: s1s2  Abd: pos bs soft nt  GI/ :  Ext: trace edema  incisions- c/d/i  sternum- intact    Central Venous Catheter: Yes[ x]  No[ ] , If Yes indication:           Day #    Temple Catheter: Yes  [ ] , No [x : If yes indication:                         Day #    NGT: Yes [ ] No [ x ]     If Yes Placement:                                          Day #    Post-Op Beta-Blockers: Yes [ x], No[ ], If No, then contraindication:    CHEST TUBE:  [ ] YES [x ] NO  OUTPUT:     per 24 hours    AIR LEAKS:  [ ] YES [ ] NO      EPICARDIAL WIRES:  [ ] YES [x ] NO      BOWEL MOVEMENT:  [x ] YES [ ] NO      LABS:                        9.2    7.26  )-----------( 151      ( 07 Oct 2019 02:00 )             28.5     COUMADIN:   [ ] YES [ x] NO    PT/INR - ( 06 Oct 2019 02:00 )   PT: 14.30 sec;   INR: 1.25 ratio           10-07    130<L>  |  99  |  42<H>  ----------------------------<  105<H>  4.7   |  22  |  1.2    Ca    9.4      07 Oct 2019 02:00  Mg     3.1     10-07    TPro  5.4<L>  /  Alb  3.7  /  TBili  0.8  /  DBili  x   /  AST  17  /  ALT  12  /  AlkPhos  127<H>  10-07        MEDICATIONS  (STANDING):  amiodarone IVPB 150 milliGRAM(s) IV Intermittent once  aspirin 325 milliGRAM(s) Oral daily  atorvastatin 10 milliGRAM(s) Oral at bedtime  BACItracin   Ointment 1 Application(s) Topical two times a day  dextrose 5%. 1000 milliLiter(s) (50 mL/Hr) IV Continuous <Continuous>  dextrose 50% Injectable 50 milliLiter(s) IV Push every 15 minutes  dextrose 50% Injectable 25 milliLiter(s) IV Push every 15 minutes  digoxin  IVPB 0.5 milliGRAM(s) IV Intermittent once  diltiazem Infusion 5 mG/Hr (5 mL/Hr) IV Continuous <Continuous>  docusate sodium 100 milliGRAM(s) Oral three times a day  famotidine    Tablet 20 milliGRAM(s) Oral two times a day  fluticasone propionate 50 MICROgram(s)/spray Nasal Spray 1 Spray(s) Both Nostrils two times a day  heparin  Injectable 5000 Unit(s) SubCutaneous every 8 hours  insulin glargine Injectable (LANTUS) 20 Unit(s) SubCutaneous every morning  insulin lispro (HumaLOG) corrective regimen sliding scale   SubCutaneous three times a day before meals  insulin lispro Injectable (HumaLOG) 4 Unit(s) SubCutaneous before breakfast  ipratropium    for Nebulization 500 MICROGram(s) Nebulizer every 6 hours  levothyroxine 112 MICROGram(s) Oral daily  magnesium sulfate  IVPB 1 Gram(s) IV Intermittent every 12 hours  meperidine     Injectable 25 milliGRAM(s) IV Push once  metoprolol tartrate 25 milliGRAM(s) Oral two times a day  polyethylene glycol 3350 17 Gram(s) Oral daily  senna 1 Tablet(s) Oral two times a day  tacrolimus 0.5 milliGRAM(s) Oral every 12 hours    MEDICATIONS  (PRN):  dextrose 40% Gel 15 Gram(s) Oral once PRN Blood Glucose LESS THAN 70 milliGRAM(s)/deciliter  glucagon  Injectable 1 milliGRAM(s) IntraMuscular once PRN Glucose LESS THAN 70 milligrams/deciliter  oxyCODONE    IR 10 milliGRAM(s) Oral every 4 hours PRN Severe Pain (7 - 10)      Allergies    No Known Drug Allergies  TEGADERM (Rash)    Intolerances    Ambulation/Activity Status:  ambulates with walker    RADIOLOGY & ADDITIONAL TESTS:    EXAM:  XR CHEST PORTABLE ROUTINE 1V            PROCEDURE DATE:  10/06/2019      INTERPRETATION:  Clinical History / Reason for exam: Post cardiac surgery   2, shortness of breath    Comparison : Chest radiograph 10/5/2019.    Technique/Positioning: Single portable AP radiograph the chest.    Findings:    Support devices: Left chest wall pacemaker. Right IJ sheath with tip in   SVC.    Cardiac/mediastinum/hilum: Unchanged    Lung parenchyma/Pleura: Bibasilar effusions/opacities appear unchanged.   No pneumothorax    Skeleton/soft tissues: Unchanged    Impression:    Bibasilar effusions/opacities appear unchanged. Overall, stable exam    Assessment/Plan: Patient is a 80 yo female s/p cabg pod # 5 with post op a. fib now in sr  cont present tx as per ct surgeon  post op a. fib on cardizem gtt; pt now in rate controlled af will confer with gi re amio tx in light of liver transplant  diuresis  s/p liver transplant will follow daily lft's and consult gi as well as tacrolimus level-- dec dose as per GI  s/p cabg- start asa and statin and cont low dose lop  renal consult being pt has jelena which has resolved and is on tacrolimus for transplant  lasix  Social Service Disposition:  home vs snf early next week OPERATIVE PROCEDURE(s):    cabg x 5            POD # 5    SURGEON(s): [place]ia    SUBJECTIVE ASSESSMENT:    Vital Signs Last 24 Hrs  T(C): 36.1 (07 Oct 2019 07:49), Max: 37.2 (06 Oct 2019 12:00)  T(F): 97 (07 Oct 2019 07:49), Max: 98.9 (06 Oct 2019 12:00)  HR: 84 (07 Oct 2019 07:49) (60 - 138)  BP: 134/59 (07 Oct 2019 07:49) (92/61 - 169/75)  BP(mean): 85 (07 Oct 2019 07:49) (70 - 110)  RR: 22 (07 Oct 2019 07:49) (18 - 35)  SpO2: 95% (07 Oct 2019 07:49) (93% - 98%)  10-06-19 @ 07:01  -  10-07-19 @ 07:00  --------------------------------------------------------  IN: 365 mL / OUT: 1035 mL / NET: -670 mL    Physical Exam  General: alert and oriented x 3  Chest: pos crackles at left base  CVS: s1s2  Abd: pos bs soft nt  GI/ :  Ext: trace edema  incisions- c/d/i  sternum- intact    Central Venous Catheter: Yes[ x]  No[ ] , If Yes indication:           Day #    Temple Catheter: Yes  [ ] , No [x : If yes indication:                         Day #    NGT: Yes [ ] No [ x ]     If Yes Placement:                                          Day #    Post-Op Beta-Blockers: Yes [ x], No[ ], If No, then contraindication:    CHEST TUBE:  [ ] YES [x ] NO  OUTPUT:     per 24 hours    AIR LEAKS:  [ ] YES [ ] NO      EPICARDIAL WIRES:  [ ] YES [x ] NO      BOWEL MOVEMENT:  [x ] YES [ ] NO      LABS:                        9.2    7.26  )-----------( 151      ( 07 Oct 2019 02:00 )             28.5     COUMADIN:   [ ] YES [ x] NO    PT/INR - ( 06 Oct 2019 02:00 )   PT: 14.30 sec;   INR: 1.25 ratio           10-07    130<L>  |  99  |  42<H>  ----------------------------<  105<H>  4.7   |  22  |  1.2    Ca    9.4      07 Oct 2019 02:00  Mg     3.1     10-07    TPro  5.4<L>  /  Alb  3.7  /  TBili  0.8  /  DBili  x   /  AST  17  /  ALT  12  /  AlkPhos  127<H>  10-07        MEDICATIONS  (STANDING):  amiodarone IVPB 150 milliGRAM(s) IV Intermittent once  aspirin 325 milliGRAM(s) Oral daily  atorvastatin 10 milliGRAM(s) Oral at bedtime  BACItracin   Ointment 1 Application(s) Topical two times a day  dextrose 5%. 1000 milliLiter(s) (50 mL/Hr) IV Continuous <Continuous>  dextrose 50% Injectable 50 milliLiter(s) IV Push every 15 minutes  dextrose 50% Injectable 25 milliLiter(s) IV Push every 15 minutes  digoxin  IVPB 0.5 milliGRAM(s) IV Intermittent once  diltiazem Infusion 5 mG/Hr (5 mL/Hr) IV Continuous <Continuous>  docusate sodium 100 milliGRAM(s) Oral three times a day  famotidine    Tablet 20 milliGRAM(s) Oral two times a day  fluticasone propionate 50 MICROgram(s)/spray Nasal Spray 1 Spray(s) Both Nostrils two times a day  heparin  Injectable 5000 Unit(s) SubCutaneous every 8 hours  insulin glargine Injectable (LANTUS) 20 Unit(s) SubCutaneous every morning  insulin lispro (HumaLOG) corrective regimen sliding scale   SubCutaneous three times a day before meals  insulin lispro Injectable (HumaLOG) 4 Unit(s) SubCutaneous before breakfast  ipratropium    for Nebulization 500 MICROGram(s) Nebulizer every 6 hours  levothyroxine 112 MICROGram(s) Oral daily  magnesium sulfate  IVPB 1 Gram(s) IV Intermittent every 12 hours  meperidine     Injectable 25 milliGRAM(s) IV Push once  metoprolol tartrate 25 milliGRAM(s) Oral two times a day  polyethylene glycol 3350 17 Gram(s) Oral daily  senna 1 Tablet(s) Oral two times a day  tacrolimus 0.5 milliGRAM(s) Oral every 12 hours    MEDICATIONS  (PRN):  dextrose 40% Gel 15 Gram(s) Oral once PRN Blood Glucose LESS THAN 70 milliGRAM(s)/deciliter  glucagon  Injectable 1 milliGRAM(s) IntraMuscular once PRN Glucose LESS THAN 70 milligrams/deciliter  oxyCODONE    IR 10 milliGRAM(s) Oral every 4 hours PRN Severe Pain (7 - 10)      Allergies    No Known Drug Allergies  TEGADERM (Rash)    Intolerances    Ambulation/Activity Status:  ambulates with walker    RADIOLOGY & ADDITIONAL TESTS:    EXAM:  XR CHEST PORTABLE ROUTINE 1V            PROCEDURE DATE:  10/06/2019      INTERPRETATION:  Clinical History / Reason for exam: Post cardiac surgery   2, shortness of breath    Comparison : Chest radiograph 10/5/2019.    Technique/Positioning: Single portable AP radiograph the chest.    Findings:    Support devices: Left chest wall pacemaker. Right IJ sheath with tip in   SVC.    Cardiac/mediastinum/hilum: Unchanged    Lung parenchyma/Pleura: Bibasilar effusions/opacities appear unchanged.   No pneumothorax    Skeleton/soft tissues: Unchanged    Impression:    Bibasilar effusions/opacities appear unchanged. Overall, stable exam    Assessment/Plan: Patient is a 80 yo female s/p cabg pod # 5 with post op a. fib now in sr  cont present tx as per ct surgeon  post op a. fib on cardizem gtt; pt now in rate controlled af ; as per gi pt may be treated with Amio  diuresis  s/p liver transplant will follow daily lft's and consult gi as well as tacrolimus level-- dec dose as per GI  s/p cabg- start asa and statin and cont low dose lop  renal consult being pt has jelena which has resolved and is on tacrolimus for transplant  lasix  Social Service Disposition:  home vs snf early next week

## 2019-10-08 LAB
ALBUMIN SERPL ELPH-MCNC: 3.7 G/DL — SIGNIFICANT CHANGE UP (ref 3.5–5.2)
ALP SERPL-CCNC: 114 U/L — SIGNIFICANT CHANGE UP (ref 30–115)
ALT FLD-CCNC: 10 U/L — SIGNIFICANT CHANGE UP (ref 0–41)
ANION GAP SERPL CALC-SCNC: 9 MMOL/L — SIGNIFICANT CHANGE UP (ref 7–14)
AST SERPL-CCNC: 13 U/L — SIGNIFICANT CHANGE UP (ref 0–41)
BASOPHILS # BLD AUTO: 0.03 K/UL — SIGNIFICANT CHANGE UP (ref 0–0.2)
BASOPHILS NFR BLD AUTO: 0.3 % — SIGNIFICANT CHANGE UP (ref 0–1)
BILIRUB SERPL-MCNC: 0.9 MG/DL — SIGNIFICANT CHANGE UP (ref 0.2–1.2)
BUN SERPL-MCNC: 33 MG/DL — HIGH (ref 10–20)
CALCIUM SERPL-MCNC: 9.6 MG/DL — SIGNIFICANT CHANGE UP (ref 8.5–10.1)
CHLORIDE SERPL-SCNC: 98 MMOL/L — SIGNIFICANT CHANGE UP (ref 98–110)
CO2 SERPL-SCNC: 24 MMOL/L — SIGNIFICANT CHANGE UP (ref 17–32)
CREAT SERPL-MCNC: 1 MG/DL — SIGNIFICANT CHANGE UP (ref 0.7–1.5)
EOSINOPHIL # BLD AUTO: 0.24 K/UL — SIGNIFICANT CHANGE UP (ref 0–0.7)
EOSINOPHIL NFR BLD AUTO: 2.8 % — SIGNIFICANT CHANGE UP (ref 0–8)
GLUCOSE BLDC GLUCOMTR-MCNC: 116 MG/DL — HIGH (ref 70–99)
GLUCOSE BLDC GLUCOMTR-MCNC: 148 MG/DL — HIGH (ref 70–99)
GLUCOSE BLDC GLUCOMTR-MCNC: 163 MG/DL — HIGH (ref 70–99)
GLUCOSE BLDC GLUCOMTR-MCNC: 166 MG/DL — HIGH (ref 70–99)
GLUCOSE SERPL-MCNC: 93 MG/DL — SIGNIFICANT CHANGE UP (ref 70–99)
HCT VFR BLD CALC: 32 % — LOW (ref 37–47)
HGB BLD-MCNC: 10.6 G/DL — LOW (ref 12–16)
IMM GRANULOCYTES NFR BLD AUTO: 0.6 % — HIGH (ref 0.1–0.3)
LYMPHOCYTES # BLD AUTO: 1.67 K/UL — SIGNIFICANT CHANGE UP (ref 1.2–3.4)
LYMPHOCYTES # BLD AUTO: 19.2 % — LOW (ref 20.5–51.1)
MAGNESIUM SERPL-MCNC: 2.4 MG/DL — SIGNIFICANT CHANGE UP (ref 1.8–2.4)
MCHC RBC-ENTMCNC: 29.7 PG — SIGNIFICANT CHANGE UP (ref 27–31)
MCHC RBC-ENTMCNC: 33.1 G/DL — SIGNIFICANT CHANGE UP (ref 32–37)
MCV RBC AUTO: 89.6 FL — SIGNIFICANT CHANGE UP (ref 81–99)
MONOCYTES # BLD AUTO: 1.06 K/UL — HIGH (ref 0.1–0.6)
MONOCYTES NFR BLD AUTO: 12.2 % — HIGH (ref 1.7–9.3)
NEUTROPHILS # BLD AUTO: 5.66 K/UL — SIGNIFICANT CHANGE UP (ref 1.4–6.5)
NEUTROPHILS NFR BLD AUTO: 64.9 % — SIGNIFICANT CHANGE UP (ref 42.2–75.2)
NRBC # BLD: 0 /100 WBCS — SIGNIFICANT CHANGE UP (ref 0–0)
PLATELET # BLD AUTO: 206 K/UL — SIGNIFICANT CHANGE UP (ref 130–400)
POTASSIUM SERPL-MCNC: 5 MMOL/L — SIGNIFICANT CHANGE UP (ref 3.5–5)
POTASSIUM SERPL-SCNC: 5 MMOL/L — SIGNIFICANT CHANGE UP (ref 3.5–5)
PROT SERPL-MCNC: 5.6 G/DL — LOW (ref 6–8)
RBC # BLD: 3.57 M/UL — LOW (ref 4.2–5.4)
RBC # FLD: 13.9 % — SIGNIFICANT CHANGE UP (ref 11.5–14.5)
SODIUM SERPL-SCNC: 131 MMOL/L — LOW (ref 135–146)
TACROLIMUS SERPL-MCNC: 6 NG/ML — SIGNIFICANT CHANGE UP
WBC # BLD: 8.71 K/UL — SIGNIFICANT CHANGE UP (ref 4.8–10.8)
WBC # FLD AUTO: 8.71 K/UL — SIGNIFICANT CHANGE UP (ref 4.8–10.8)

## 2019-10-08 PROCEDURE — 71045 X-RAY EXAM CHEST 1 VIEW: CPT | Mod: 26

## 2019-10-08 PROCEDURE — 99233 SBSQ HOSP IP/OBS HIGH 50: CPT

## 2019-10-08 PROCEDURE — 93010 ELECTROCARDIOGRAM REPORT: CPT

## 2019-10-08 RX ORDER — WARFARIN SODIUM 2.5 MG/1
2 TABLET ORAL ONCE
Refills: 0 | Status: COMPLETED | OUTPATIENT
Start: 2019-10-08 | End: 2019-10-08

## 2019-10-08 RX ADMIN — HEPARIN SODIUM 5000 UNIT(S): 5000 INJECTION INTRAVENOUS; SUBCUTANEOUS at 05:41

## 2019-10-08 RX ADMIN — Medication 25 MILLIGRAM(S): at 05:42

## 2019-10-08 RX ADMIN — Medication 30 MILLIGRAM(S): at 05:42

## 2019-10-08 RX ADMIN — Medication 500 MICROGRAM(S): at 15:06

## 2019-10-08 RX ADMIN — Medication 4 UNIT(S): at 07:37

## 2019-10-08 RX ADMIN — Medication 100 GRAM(S): at 18:09

## 2019-10-08 RX ADMIN — HEPARIN SODIUM 5000 UNIT(S): 5000 INJECTION INTRAVENOUS; SUBCUTANEOUS at 21:42

## 2019-10-08 RX ADMIN — Medication 30 MILLIGRAM(S): at 00:26

## 2019-10-08 RX ADMIN — Medication 30 MILLIGRAM(S): at 11:04

## 2019-10-08 RX ADMIN — Medication 100 MILLIGRAM(S): at 14:04

## 2019-10-08 RX ADMIN — WARFARIN SODIUM 2 MILLIGRAM(S): 2.5 TABLET ORAL at 21:42

## 2019-10-08 RX ADMIN — INSULIN GLARGINE 20 UNIT(S): 100 INJECTION, SOLUTION SUBCUTANEOUS at 07:32

## 2019-10-08 RX ADMIN — POLYETHYLENE GLYCOL 3350 17 GRAM(S): 17 POWDER, FOR SOLUTION ORAL at 11:06

## 2019-10-08 RX ADMIN — SENNA PLUS 1 TABLET(S): 8.6 TABLET ORAL at 05:42

## 2019-10-08 RX ADMIN — Medication 30 MILLIGRAM(S): at 18:05

## 2019-10-08 RX ADMIN — Medication 30 MILLIGRAM(S): at 23:34

## 2019-10-08 RX ADMIN — Medication 500 MICROGRAM(S): at 20:25

## 2019-10-08 RX ADMIN — HEPARIN SODIUM 5000 UNIT(S): 5000 INJECTION INTRAVENOUS; SUBCUTANEOUS at 14:04

## 2019-10-08 RX ADMIN — Medication 100 MILLIGRAM(S): at 21:41

## 2019-10-08 RX ADMIN — Medication 1 APPLICATION(S): at 18:06

## 2019-10-08 RX ADMIN — ATORVASTATIN CALCIUM 10 MILLIGRAM(S): 80 TABLET, FILM COATED ORAL at 21:42

## 2019-10-08 RX ADMIN — Medication 500 MICROGRAM(S): at 07:55

## 2019-10-08 RX ADMIN — SENNA PLUS 1 TABLET(S): 8.6 TABLET ORAL at 18:05

## 2019-10-08 RX ADMIN — FAMOTIDINE 20 MILLIGRAM(S): 10 INJECTION INTRAVENOUS at 05:42

## 2019-10-08 RX ADMIN — Medication 1: at 11:03

## 2019-10-08 RX ADMIN — Medication 100 GRAM(S): at 05:46

## 2019-10-08 RX ADMIN — Medication 1: at 16:30

## 2019-10-08 RX ADMIN — Medication 25 MILLIGRAM(S): at 18:05

## 2019-10-08 RX ADMIN — FAMOTIDINE 20 MILLIGRAM(S): 10 INJECTION INTRAVENOUS at 18:05

## 2019-10-08 RX ADMIN — TACROLIMUS 0.5 MILLIGRAM(S): 5 CAPSULE ORAL at 05:42

## 2019-10-08 RX ADMIN — Medication 1 SPRAY(S): at 05:40

## 2019-10-08 RX ADMIN — Medication 1 APPLICATION(S): at 05:41

## 2019-10-08 RX ADMIN — Medication 112 MICROGRAM(S): at 05:42

## 2019-10-08 RX ADMIN — Medication 1 SPRAY(S): at 18:05

## 2019-10-08 RX ADMIN — Medication 325 MILLIGRAM(S): at 11:05

## 2019-10-08 RX ADMIN — OXYCODONE HYDROCHLORIDE 10 MILLIGRAM(S): 5 TABLET ORAL at 00:00

## 2019-10-08 RX ADMIN — TACROLIMUS 0.5 MILLIGRAM(S): 5 CAPSULE ORAL at 18:06

## 2019-10-08 RX ADMIN — Medication 100 MILLIGRAM(S): at 05:42

## 2019-10-08 NOTE — PROGRESS NOTE ADULT - SUBJECTIVE AND OBJECTIVE BOX
CTU Attending Progress Daily Note     08 Oct 2019 08:35    Procedure:                                                  POD#                   Patient seen as post-op critical care follow-up    HPI:    See preop testing chart H&P    Interval event for past 24 hr:  NEPTALI ANNA  79y had no event.     Current Complains:  NEPTALI ANNA has no new complaints    REVIEW OF SYSTEMS:  CONSTITUTIONAL:  [-] weakness, [-] fevers, [-] chills  EYES/ENT: [-] visual changes, [-] vertigo, [-] throat pain   NECK: [-] pain, [-] stiffness  RESPIRATORY: [-] cough, [-] wheezing, [-] hemoptysis, [-] shortness of breath  CARDIOVASCULAR: [-] chest pain, [-] palpitations, [-] orthopnea  GASTROINTESTINAL:    [-]abdominal pain, [-] nausea, [-] vomiting, [-] hematemesis, [-] diarrhea, [-] constipation, [-] melena, [-] hematochezia.  GENITOURINARY: [-] dysuria, [-] frequency, [-] hematuria  NEUROLOGICAL: [-] numbness, [-] weakness  SKIN: [-] itching, [-] burning, [-] rashes, [-] lesions   All other review of systems is negative unless indicated above.    [  ] Unable to assess ROS because :    OBJECTIVE:  ICU Vital Signs Last 24 Hrs  T(C): 36.1 (08 Oct 2019 08:00), Max: 36.5 (07 Oct 2019 12:00)  T(F): 97 (08 Oct 2019 08:00), Max: 97.7 (07 Oct 2019 12:00)  HR: 60 (08 Oct 2019 08:00) (60 - 96)  BP: 115/58 (08 Oct 2019 08:00) (105/52 - 160/63)  BP(mean): 76 (08 Oct 2019 08:00) (74 - 116)  ABP: --  ABP(mean): --  RR: 18 (08 Oct 2019 08:00) (18 - 22)  SpO2: 98% (08 Oct 2019 08:00) (95% - 100%)      I&O's Summary    07 Oct 2019 07:01  -  08 Oct 2019 07:00  --------------------------------------------------------  IN: 1806.6 mL / OUT: 1300 mL / NET: 506.6 mL      Adult Advanced Hemodynamics Last 24 Hrs  CVP(mm Hg): --  CVP(cm H2O): --  CO: --  CI: --  PA: --  PA(mean): --  PCWP: --  SVR: --  SVRI: --  PVR: --  PVRI: --      PHYSICAL EXAM:  General: WN/WD NAD    HEENT:     [+] NCAT  [+] EOMI  [-] Conjuctival edema   [-] Icterus   [-] Thrush   [-] ETT  [-] NGT/OGT    Neck:         [+] FROM   [-] JVD     [-] Nodes     [-] Masses    [+] Mid-line trachea    [-] Tracheostomy    Chest:         [-] Sternal click   [-] Sternal drainage   [+] Pacing wires   [+] Chest tubes   [-] SubQ emphysema    Lungs:          [+] CTA   [-] Rhonchi   [-] Rales    [-] Wheezing    [-] Decreased BS    [-] Dullness R L    Cardiac:       [+] S1 [+] S2    [+] RRR   [-] Irregular   [-] S3   [-] S4    [-] Murmurs    [-] Rub    Abdomen:    [+] BS    [+] Soft    [+] Non-tender     [-] Distended    [-] Organomegaly  [-] PEG    Extremities:   [-] Cyanosis U/L   [-] Clubbing  U/L  [-] LE/UE Edema   [+] Capillary refill    [+] Pulses     Neuro:        [+] Awake   [+]  Alert   [-] Confused   [-] Lethargic   [-] Sedated   [-] Generalized Weakness    Skin:        [-] Rashes    [-] Erythema   [+] Normal incisions   [+] IV sites intact   [-] Sacral decubitus    Tubes:  LINES:    CAPILLARY BLOOD GLUCOSE  159 (07 Oct 2019 11:00)      POCT Blood Glucose.: 116 mg/dL (08 Oct 2019 07:19)    CAPILLARY BLOOD GLUCOSE  159 (07 Oct 2019 11:00)      POCT Blood Glucose.: 116 mg/dL (08 Oct 2019 07:19)  POCT Blood Glucose.: 117 mg/dL (07 Oct 2019 22:07)  POCT Blood Glucose.: 153 mg/dL (07 Oct 2019 15:35)  POCT Blood Glucose.: 159 mg/dL (07 Oct 2019 11:12)      HOSPITAL MEDICATIONS:  MEDICATIONS  (STANDING):  aspirin 325 milliGRAM(s) Oral daily  atorvastatin 10 milliGRAM(s) Oral at bedtime  BACItracin   Ointment 1 Application(s) Topical two times a day  dextrose 5%. 1000 milliLiter(s) (50 mL/Hr) IV Continuous <Continuous>  dextrose 50% Injectable 50 milliLiter(s) IV Push every 15 minutes  dextrose 50% Injectable 25 milliLiter(s) IV Push every 15 minutes  diltiazem    Tablet 30 milliGRAM(s) Oral every 6 hours  docusate sodium 100 milliGRAM(s) Oral three times a day  famotidine    Tablet 20 milliGRAM(s) Oral two times a day  fluticasone propionate 50 MICROgram(s)/spray Nasal Spray 1 Spray(s) Both Nostrils two times a day  heparin  Injectable 5000 Unit(s) SubCutaneous every 8 hours  insulin glargine Injectable (LANTUS) 20 Unit(s) SubCutaneous every morning  insulin lispro (HumaLOG) corrective regimen sliding scale   SubCutaneous three times a day before meals  insulin lispro Injectable (HumaLOG) 4 Unit(s) SubCutaneous before breakfast  ipratropium    for Nebulization 500 MICROGram(s) Nebulizer every 6 hours  levothyroxine 112 MICROGram(s) Oral daily  magnesium sulfate  IVPB 1 Gram(s) IV Intermittent every 12 hours  meperidine     Injectable 25 milliGRAM(s) IV Push once  metoprolol tartrate 25 milliGRAM(s) Oral two times a day  polyethylene glycol 3350 17 Gram(s) Oral daily  senna 1 Tablet(s) Oral two times a day  tacrolimus 0.5 milliGRAM(s) Oral every 12 hours    MEDICATIONS  (PRN):  dextrose 40% Gel 15 Gram(s) Oral once PRN Blood Glucose LESS THAN 70 milliGRAM(s)/deciliter  glucagon  Injectable 1 milliGRAM(s) IntraMuscular once PRN Glucose LESS THAN 70 milligrams/deciliter  oxyCODONE    IR 10 milliGRAM(s) Oral every 4 hours PRN Severe Pain (7 - 10)      LABS:                          10.6   8.71  )-----------( 206      ( 08 Oct 2019 03:00 )             32.0     10-08    131<L>  |  98  |  33<H>  ----------------------------<  93  5.0   |  24  |  1.0    Ca    9.6      08 Oct 2019 03:00  Mg     2.4     10-08    TPro  5.6<L>  /  Alb  3.7  /  TBili  0.9  /  DBili  x   /  AST  13  /  ALT  10  /  AlkPhos  114  10-08    PT/INR - ( 07 Oct 2019 19:30 )   PT: 15.40 sec;   INR: 1.34 ratio                 RADIOLOGY:  Reviewed and interpreted by me  CXR from 10-08-19 shows [+] mild congestion, [-] pneumothorax, [-] R/L effusion, [-] cardiomegaly,   NGT in place, S-G Catheter in place, R/L TLC in place, R/L Chest Tubes in place    ECG:  Reviewed and interpreted by me:   QTC:    Assessment:      PAST MEDICAL & SURGICAL HISTORY:  Myocardial infarction  HTN (hypertension)  SOB (shortness of breath)  Hypothyroid  Liver transplant status  Diabetes  H/O heart artery stent: X5  Pacemaker: medtronic- last interogated 1 m ago  Liver transplanted      PLAN:  Neuro: Pain control  Pulm: Encourage coughing, deep breathing and use of incentive spirometry. Wean off supplemental oxygen as able. Daily CXR.   Cardio: Monitor telemetry/alarms. Continue cardiac meds  GI: Tolerating diet. Continue stool softeners. Continue GI prophylaxis  Renal: monitor urine output, supplement electrolytes as needed  Vasc: Heparin SC/SCDs for DVT prophylaxis  Heme: Monitor H/H.   ID: Off antibiotics. Stable.  Endocrine: Monitor finger stick blood sugar and control hyperglycemia with insulin  Physical Therapy: OOB/ambulate  Tubes: Monitor Chest tube output      Discussed with Cardiothoracic Team at AM rounds. CTU Attending Progress Daily Note     08 Oct 2019 08:35    Procedure:          CABG                                        POD#           6        Patient seen as post-op critical care follow-up    HPI:    See preop testing chart H&P    Interval event for past 24 hr:  NEPTALI ANNA  79y had Afib    Current Complains:  NEPTALI ANNA has no new complaints    REVIEW OF SYSTEMS:  CONSTITUTIONAL:  [-] weakness, [-] fevers, [-] chills  EYES/ENT: [-] visual changes, [-] vertigo, [-] throat pain   NECK: [-] pain, [-] stiffness  RESPIRATORY: [-] cough, [-] wheezing, [-] hemoptysis, [-] shortness of breath  CARDIOVASCULAR: [-] chest pain, [-] palpitations, [-] orthopnea  GASTROINTESTINAL:    [-]abdominal pain, [-] nausea, [-] vomiting, [-] hematemesis, [-] diarrhea, [-] constipation, [-] melena, [-] hematochezia.  GENITOURINARY: [-] dysuria, [-] frequency, [-] hematuria  NEUROLOGICAL: [-] numbness, [-] weakness  SKIN: [-] itching, [-] burning, [-] rashes, [-] lesions   All other review of systems is negative unless indicated above.    [  ] Unable to assess ROS because :    OBJECTIVE:  ICU Vital Signs Last 24 Hrs  T(C): 36.1 (08 Oct 2019 08:00), Max: 36.5 (07 Oct 2019 12:00)  T(F): 97 (08 Oct 2019 08:00), Max: 97.7 (07 Oct 2019 12:00)  HR: 60 (08 Oct 2019 08:00) (60 - 96)  BP: 115/58 (08 Oct 2019 08:00) (105/52 - 160/63)  BP(mean): 76 (08 Oct 2019 08:00) (74 - 116)  ABP: --  ABP(mean): --  RR: 18 (08 Oct 2019 08:00) (18 - 22)  SpO2: 98% (08 Oct 2019 08:00) (95% - 100%)      I&O's Summary    07 Oct 2019 07:01  -  08 Oct 2019 07:00  --------------------------------------------------------  IN: 1806.6 mL / OUT: 1300 mL / NET: 506.6 mL        PHYSICAL EXAM:  General: WN/WD NAD    HEENT:     [+] NCAT  [+] EOMI  [-] Conjuctival edema   [-] Icterus   [-] Thrush   [-] ETT  [-] NGT/OGT    Neck:         [+] FROM   [-] JVD     [-] Nodes     [-] Masses    [+] Mid-line trachea    [-] Tracheostomy    Chest:         [-] Sternal click   [-] Sternal drainage    [-] SubQ emphysema    Lungs:          [+] CTA   [-] Rhonchi   [-] Rales    [-] Wheezing    [-] Decreased BS    [-] Dullness R L    Cardiac:       [+] S1 [+] S2    [+] RRR   [-] Irregular   [-] S3   [-] S4    [-] Murmurs    [-] Rub    Abdomen:    [+] BS    [+] Soft    [+] Non-tender     [-] Distended    [-] Organomegaly  [-] PEG    Extremities:   [-] Cyanosis U/L   [-] Clubbing  U/L  [-] LE/UE Edema   [+] Capillary refill    [+] Pulses     Neuro:        [+] Awake   [+]  Alert   [-] Confused   [-] Lethargic   [-] Sedated   [-] Generalized Weakness    Skin:        [-] Rashes    [-] Erythema   [+] Normal incisions   [+] IV sites intact   [-] Sacral decubitus      CAPILLARY BLOOD GLUCOSE  159 (07 Oct 2019 11:00)      POCT Blood Glucose.: 116 mg/dL (08 Oct 2019 07:19)    CAPILLARY BLOOD GLUCOSE  159 (07 Oct 2019 11:00)      POCT Blood Glucose.: 116 mg/dL (08 Oct 2019 07:19)  POCT Blood Glucose.: 117 mg/dL (07 Oct 2019 22:07)  POCT Blood Glucose.: 153 mg/dL (07 Oct 2019 15:35)  POCT Blood Glucose.: 159 mg/dL (07 Oct 2019 11:12)      HOSPITAL MEDICATIONS:  MEDICATIONS  (STANDING):  aspirin 325 milliGRAM(s) Oral daily  atorvastatin 10 milliGRAM(s) Oral at bedtime  BACItracin   Ointment 1 Application(s) Topical two times a day  dextrose 5%. 1000 milliLiter(s) (50 mL/Hr) IV Continuous <Continuous>  dextrose 50% Injectable 50 milliLiter(s) IV Push every 15 minutes  dextrose 50% Injectable 25 milliLiter(s) IV Push every 15 minutes  diltiazem    Tablet 30 milliGRAM(s) Oral every 6 hours  docusate sodium 100 milliGRAM(s) Oral three times a day  famotidine    Tablet 20 milliGRAM(s) Oral two times a day  fluticasone propionate 50 MICROgram(s)/spray Nasal Spray 1 Spray(s) Both Nostrils two times a day  heparin  Injectable 5000 Unit(s) SubCutaneous every 8 hours  insulin glargine Injectable (LANTUS) 20 Unit(s) SubCutaneous every morning  insulin lispro (HumaLOG) corrective regimen sliding scale   SubCutaneous three times a day before meals  insulin lispro Injectable (HumaLOG) 4 Unit(s) SubCutaneous before breakfast  ipratropium    for Nebulization 500 MICROGram(s) Nebulizer every 6 hours  levothyroxine 112 MICROGram(s) Oral daily  magnesium sulfate  IVPB 1 Gram(s) IV Intermittent every 12 hours  meperidine     Injectable 25 milliGRAM(s) IV Push once  metoprolol tartrate 25 milliGRAM(s) Oral two times a day  polyethylene glycol 3350 17 Gram(s) Oral daily  senna 1 Tablet(s) Oral two times a day  tacrolimus 0.5 milliGRAM(s) Oral every 12 hours    MEDICATIONS  (PRN):  dextrose 40% Gel 15 Gram(s) Oral once PRN Blood Glucose LESS THAN 70 milliGRAM(s)/deciliter  glucagon  Injectable 1 milliGRAM(s) IntraMuscular once PRN Glucose LESS THAN 70 milligrams/deciliter  oxyCODONE    IR 10 milliGRAM(s) Oral every 4 hours PRN Severe Pain (7 - 10)      LABS:                          10.6   8.71  )-----------( 206      ( 08 Oct 2019 03:00 )             32.0     10-08    131<L>  |  98  |  33<H>  ----------------------------<  93  5.0   |  24  |  1.0    Ca    9.6      08 Oct 2019 03:00  Mg     2.4     10-08    TPro  5.6<L>  /  Alb  3.7  /  TBili  0.9  /  DBili  x   /  AST  13  /  ALT  10  /  AlkPhos  114  10-08    PT/INR - ( 07 Oct 2019 19:30 )   PT: 15.40 sec;   INR: 1.34 ratio                 RADIOLOGY:  Reviewed and interpreted by me  CXR from 10-08-19 shows [+] mild congestion, [-] pneumothorax, [-] R/L effusion, [-] cardiomegaly,       ECG:  Reviewed and interpreted by me: Afib 71  QTC: 352    Assessment:  CAD SP CABG  Afib: acute being actively managed with cardizem    PAST MEDICAL & SURGICAL HISTORY:  Myocardial infarction  HTN (hypertension)  SOB (shortness of breath)  Hypothyroid  Liver transplant status  Diabetes  H/O heart artery stent: X5  Pacemaker: medtronic- last interogated 1 m ago  Liver transplanted      PLAN:  Neuro: Pain control  Pulm: Encourage coughing, deep breathing and use of incentive spirometry. Wean off supplemental oxygen as able. Daily CXR.   Cardio: Monitor telemetry/alarms. Continue cardiac meds  GI: Tolerating diet. Continue stool softeners. Continue GI prophylaxis  Renal: monitor urine output, supplement electrolytes as needed  Vasc: Heparin SC/SCDs for DVT prophylaxis  Heme: Monitor H/H.   ID: Off antibiotics. Stable.  Endocrine: Monitor finger stick blood sugar and control hyperglycemia with insulin  Physical Therapy: OOB/ambulate  Tubes: Monitor Chest tube output      Discussed with Cardiothoracic Team at AM rounds.

## 2019-10-08 NOTE — DIETITIAN INITIAL EVALUATION ADULT. - ENERGY NEEDS
Calories: 1463-9733 kcal/day (MSJ x 1.2-1.3 AF)  Protein: 68-82 gm/day (1-1.2 gm/kg CBW)  Fluid: per CTU team

## 2019-10-08 NOTE — PROGRESS NOTE ADULT - SUBJECTIVE AND OBJECTIVE BOX
OPERATIVE PROCEDURE(s):    CABG x 5            POD # 6                      79yFemale  SURGEON(s): JUAREZ Mirza  SUBJECTIVE ASSESSMENT:  Patient has no complaints at this time.    Vital Signs Last 24 Hrs  T(F): 97 (08 Oct 2019 08:00), Max: 97.7 (07 Oct 2019 12:00)  HR: 60 (08 Oct 2019 08:00) (60 - 96) AF with PPM backup at 60 bpm  BP: 115/58 (08 Oct 2019 08:00) (105/52 - 160/63)  BP(mean): 76 (08 Oct 2019 08:00) (74 - 116)  RR: 18 (08 Oct 2019 08:00) (18 - 22)  SpO2: 98% (08 Oct 2019 08:00) (95% - 100%) GIOVANNY      I&O's Detail    07 Oct 2019 07:01  -  08 Oct 2019 07:00  --------------------------------------------------------  IN:    amiodarone Infusion: 66.6 mL    diltiazem Infusion: 60 mL    diltiazem Infusion: 10 mL    IV PiggyBack: 250 mL    Oral Fluid: 1420 mL  Total IN: 1806.6 mL    OUT:    Voided: 1300 mL  Total OUT: 1300 mL        Net:   I&O's Detail    06 Oct 2019 07:01  -  07 Oct 2019 07:00  --------------------------------------------------------  Total NET: -670 mL      07 Oct 2019 07:01  -  08 Oct 2019 07:00  --------------------------------------------------------  Total NET: 506.6 mL        CAPILLARY BLOOD GLUCOSE  159 (07 Oct 2019 11:00)      POCT Blood Glucose.: 116 mg/dL (08 Oct 2019 07:19)  POCT Blood Glucose.: 117 mg/dL (07 Oct 2019 22:07)  POCT Blood Glucose.: 153 mg/dL (07 Oct 2019 15:35)  POCT Blood Glucose.: 159 mg/dL (07 Oct 2019 11:12)    Physical Exam:  General: NAD; A&Ox3  Cardiac: S1/S2, RRR, no murmur, no rubs  Lungs: unlabored respirations, CTA b/l, no wheeze, no rales, no crackles  Abdomen: Soft/NT/ND; positive bowel sounds x 4  Sternum: Intact, no click, incision healing well with no drainage  Incisions: Incisions clean/dry/intact  Extremities: No edema b/l lower extremities; good capillary refill; no cyanosis; palpable 1+ pedal pulses b/l    Central Venous Catheter: Yes[x]  No[] , If Yes indication:           Day #6  Temple Catheter: Yes  [] , No  [x] , If yes indication:                      Day #  NGT: Yes [] No [x] ,    If Yes Placement:                                     Day #  EPICARDIAL WIRES:  [] YES [x] NO                                              Day #  BOWEL MOVEMENT:  [x] YES [] NO, If No, Timing since last BM:      Day #  CHEST TUBE(Left/Right):  [] YES [x] NO, If yes -  AIR LEAKS:  [] YES [] NO        LABS:                        10.6<L>  8.71  )-----------( 206      ( 08 Oct 2019 03:00 )             32.0<L>                        9.2<L>  7.26  )-----------( 151      ( 07 Oct 2019 02:00 )             28.5<L>    10-08    131<L>  |  98  |  33<H>  ----------------------------<  93  5.0   |  24  |  1.0  10-07    130<L>  |  99  |  42<H>  ----------------------------<  105<H>  4.7   |  22  |  1.2    Ca    9.6      08 Oct 2019 03:00  Mg     2.4     10-08    TPro  5.6<L> [6.0 - 8.0]  /  Alb  3.7 [3.5 - 5.2]  /  TBili  0.9 [0.2 - 1.2]  /  DBili  x   /  AST  13 [0 - 41]  /  ALT  10 [0 - 41]  /  AlkPhos  114 [30 - 115]  10-08    PT/INR - ( 07 Oct 2019 19:30 )   PT: ;   INR: 1.34 ratio           RADIOLOGY & ADDITIONAL TESTS:  CXR: Xray Chest 1 View- PORTABLE-Routine:   EXAM:  XR CHEST PORTABLE ROUTINE 1V            PROCEDURE DATE:  10/08/2019            INTERPRETATION:  Clinical History / Reason for exam: Shortness of breath.    Comparison : Chest radiograph October 7, 2019.    Technique/Positioning: Single frontal chest x-ray obtained.    Findings:    Support devices: Stable right IJ vascular sheath, left-sided pacemaker.    Cardiac/mediastinum/hilum: Unchanged.    Lung parenchyma/Pleura: Stable bilateral pleural effusion/opacities.    Skeleton/soft tissues:Unchanged.    Impression:      Stable bilateral pleural effusion/opacities      AYESHA PITTMAN M.D., ATTENDING RADIOLOGIST  This document has been electronically signed. Oct  8 2019  8:24AM             (10-08-19 @ 05:27)    MEDICATIONS  (STANDING):  aspirin 325 milliGRAM(s) Oral daily  atorvastatin 10 milliGRAM(s) Oral at bedtime  BACItracin   Ointment 1 Application(s) Topical two times a day  dextrose 5%. 1000 milliLiter(s) (50 mL/Hr) IV Continuous <Continuous>  dextrose 50% Injectable 50 milliLiter(s) IV Push every 15 minutes  dextrose 50% Injectable 25 milliLiter(s) IV Push every 15 minutes  diltiazem    Tablet 30 milliGRAM(s) Oral every 6 hours  docusate sodium 100 milliGRAM(s) Oral three times a day  famotidine    Tablet 20 milliGRAM(s) Oral two times a day  fluticasone propionate 50 MICROgram(s)/spray Nasal Spray 1 Spray(s) Both Nostrils two times a day  heparin  Injectable 5000 Unit(s) SubCutaneous every 8 hours  insulin glargine Injectable (LANTUS) 20 Unit(s) SubCutaneous every morning  insulin lispro (HumaLOG) corrective regimen sliding scale   SubCutaneous three times a day before meals  insulin lispro Injectable (HumaLOG) 4 Unit(s) SubCutaneous before breakfast  ipratropium    for Nebulization 500 MICROGram(s) Nebulizer every 6 hours  levothyroxine 112 MICROGram(s) Oral daily  magnesium sulfate  IVPB 1 Gram(s) IV Intermittent every 12 hours  meperidine     Injectable 25 milliGRAM(s) IV Push once  metoprolol tartrate 25 milliGRAM(s) Oral two times a day  polyethylene glycol 3350 17 Gram(s) Oral daily  senna 1 Tablet(s) Oral two times a day  tacrolimus 0.5 milliGRAM(s) Oral every 12 hours    MEDICATIONS  (PRN):  dextrose 40% Gel 15 Gram(s) Oral once PRN Blood Glucose LESS THAN 70 milliGRAM(s)/deciliter  glucagon  Injectable 1 milliGRAM(s) IntraMuscular once PRN Glucose LESS THAN 70 milligrams/deciliter  oxyCODONE    IR 10 milliGRAM(s) Oral every 4 hours PRN Severe Pain (7 - 10)    HEPARIN:  [x] YES [] NO  Dose: 5000 UNITS Q8H - will d/c when INR is above 1.7  COUMADIN: [x]  YES [] NO  Dose: 2mg  Q24H  SCD's: YES b/l  GI Prophylaxis: Protonix [], Pepcid [x], None [], (Contra-indication:.....)    Post-Op Aspirin: Yes [x],  No [], If No, then contra-indication:  Post-Op Statin: Yes [x], No[], If No, then contra-indication:  Post-Op Beta-Blockers: Yes [x], No [], If No, then contra-indication:    Allergies:  No Known Drug Allergies  TEGADERM (Rash)      Ambulation/Activity Status: Ambulates several times daily with assistance.    Assessment/Plan:  79y Female status-post CABG x 5  - Case and plan discussed with CTU Intensivist and CT Surgeon - Dr. Leon/Yuki/Martin   - Continue CTU supportive care    - Continue DVT/GI prophylaxis  - Incentive Spirometry 10 times an hour  - Continue to advance physical activity as tolerated and continue PT/OT as directed  1. CAD: Continue ASA, statin, BB  2. DM - cont Lantus/Humalog  3. AF - cont Coumadin 2mg tonight  4. Remove central line and obtain PIV  5. D/C planning in progress to SNF

## 2019-10-08 NOTE — DIETITIAN INITIAL EVALUATION ADULT. - ADD RECOMMEND
1. Change diet to DASH/TLC + CHO consistent/HS, 2. D/C Glucerna per pt request, order Ensure Enlive BID--pt's glucose levels have been stable

## 2019-10-08 NOTE — DIETITIAN INITIAL EVALUATION ADULT. - FEEDING SKILL
independent/Pt states that she doesn't really have an appetite, and is a picky eater (which is normal for her), currently consuming 50% of her meal trays. Glucerna is ordered, however pt hasn't been receiving it on meal trays; she prefers Ensure over Glucerna and states she won't drink it if they give it to her.

## 2019-10-08 NOTE — PROGRESS NOTE ADULT - ASSESSMENT
Pt with CAD s/p CABG, HTN, DM, s/p liver Tx>10 trs ago, developed AUDRA post op and fluid overload.    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

## 2019-10-08 NOTE — DIETITIAN INITIAL EVALUATION ADULT. - OTHER INFO
Nutrition Hx PTA: Pt states that she's a very picky eater, hasn't had much of an appetite x7 years. Consumes small, frequent meals daily and drinks Ensure Enlive 4x/day. Pt LOVES BREAD, states she can't give that up, even though she knows it's not good for her. Pt doesn't like poultry, but only has red meat once in a while. Pt enjoys consuming vegetables and carb heavy foods. Pt is aware of diabetic/heart healthy diet, however noncompliant and isn't motivated to change. Refused further diet ed. NKFA.    POD #6 CABG x5--continue to advance physical activity as tolerated and continue PT/OT as directed. Remove central line and obtain PIV. D/C planning in progress to SNF.

## 2019-10-08 NOTE — DIETITIAN INITIAL EVALUATION ADULT. - FACTORS AFF FOOD INTAKE
other (specify)/has difficulty swallowing pills, but other than that is OK with food/liquids. LBM 10/7

## 2019-10-08 NOTE — DIETITIAN INITIAL EVALUATION ADULT. - PERTINENT MEDS FT
coumadin, aspirin, heparin, abx, insulin, cardizem, lopressor, lipitor, mag sulfate, senna, pepcid, synthroid, colace, miralax

## 2019-10-08 NOTE — DIETITIAN INITIAL EVALUATION ADULT. - PHYSICAL APPEARANCE
alert and oriented, very pleasant. BMI: 26.5 using lowest wt of 68kg, IBW: 115#, UBW: ~142#, denies any recent wt loss. 1+ edema to B/L foot noted, surgical incision./overweight

## 2019-10-08 NOTE — PROGRESS NOTE ADULT - SUBJECTIVE AND OBJECTIVE BOX
Nephrology progress note    THIS IS AN INCOMPLETE NOTE . FULL NOTE TO FOLLOW SHORTLY    Patient is seen and examined, events over the last 24 h noted .    Allergies:  No Known Drug Allergies  TEGADERM (Rash)    Hospital Medications:   MEDICATIONS  (STANDING):  aspirin 325 milliGRAM(s) Oral daily  atorvastatin 10 milliGRAM(s) Oral at bedtime  BACItracin   Ointment 1 Application(s) Topical two times a day  dextrose 5%. 1000 milliLiter(s) (50 mL/Hr) IV Continuous <Continuous>  dextrose 50% Injectable 50 milliLiter(s) IV Push every 15 minutes  dextrose 50% Injectable 25 milliLiter(s) IV Push every 15 minutes  diltiazem    Tablet 30 milliGRAM(s) Oral every 6 hours  docusate sodium 100 milliGRAM(s) Oral three times a day  famotidine    Tablet 20 milliGRAM(s) Oral two times a day  fluticasone propionate 50 MICROgram(s)/spray Nasal Spray 1 Spray(s) Both Nostrils two times a day  heparin  Injectable 5000 Unit(s) SubCutaneous every 8 hours  insulin glargine Injectable (LANTUS) 20 Unit(s) SubCutaneous every morning  insulin lispro (HumaLOG) corrective regimen sliding scale   SubCutaneous three times a day before meals  insulin lispro Injectable (HumaLOG) 4 Unit(s) SubCutaneous before breakfast  ipratropium    for Nebulization 500 MICROGram(s) Nebulizer every 6 hours  levothyroxine 112 MICROGram(s) Oral daily  magnesium sulfate  IVPB 1 Gram(s) IV Intermittent every 12 hours  meperidine     Injectable 25 milliGRAM(s) IV Push once  metoprolol tartrate 25 milliGRAM(s) Oral two times a day  polyethylene glycol 3350 17 Gram(s) Oral daily  senna 1 Tablet(s) Oral two times a day  tacrolimus 0.5 milliGRAM(s) Oral every 12 hours        VITALS:  T(F): 97 (10-08-19 @ 08:00), Max: 97.7 (10-07-19 @ 12:00)  HR: 60 (10-08-19 @ 08:00)  BP: 115/58 (10-08-19 @ 08:00)  RR: 18 (10-08-19 @ 08:00)  SpO2: 98% (10-08-19 @ 08:00)  Wt(kg): --    10-06 @ 07:01  -  10-07 @ 07:00  --------------------------------------------------------  IN: 365 mL / OUT: 1035 mL / NET: -670 mL    10-07 @ 07:01  -  10-08 @ 07:00  --------------------------------------------------------  IN: 1806.6 mL / OUT: 1300 mL / NET: 506.6 mL          PHYSICAL EXAM:  Constitutional: NAD  HEENT: anicteric sclera, oropharynx clear, MMM  Neck: No JVD  Respiratory: CTAB, no wheezes, rales or rhonchi  Cardiovascular: S1, S2, RRR  Gastrointestinal: BS+, soft, NT/ND  Extremities: No cyanosis or clubbing. No peripheral edema  :  No suarez.   Skin: No rashes    LABS:  10-08    131<L>  |  98  |  33<H>  ----------------------------<  93  5.0   |  24  |  1.0  Creatinine Trend: 1.0<--, 1.2<--, 1.3<--, 1.5<--, 1.5<--, 1.5<--  Ca    9.6      08 Oct 2019 03:00  Mg     2.4     10-08    TPro  5.6<L>  /  Alb  3.7  /  TBili  0.9  /  DBili      /  AST  13  /  ALT  10  /  AlkPhos  114  10-08                          10.6   8.71  )-----------( 206      ( 08 Oct 2019 03:00 )             32.0       Urine Studies:  Urinalysis Basic - ( 02 Oct 2019 00:00 )    Color: Colorless / Appearance: Clear / S.010 / pH:   Gluc:  / Ketone: Negative  / Bili: Negative / Urobili: <2 mg/dL   Blood:  / Protein: Trace / Nitrite: Negative   Leuk Esterase: Negative / RBC:  / WBC    Sq Epi:  / Non Sq Epi:  / Bacteria:         RADIOLOGY & ADDITIONAL STUDIES: Nephrology progress note  Patient is seen and examined, events over the last 24 h noted .  denied SOB   no chest pain no other complaints   Allergies:  No Known Drug Allergies  TEGADERM (Rash)    Hospital Medications:   MEDICATIONS  (STANDING):  aspirin 325 milliGRAM(s) Oral daily  atorvastatin 10 milliGRAM(s) Oral at bedtime  BACItracin   Ointment 1 Application(s) Topical two times a day  dextrose 5%. 1000 milliLiter(s) (50 mL/Hr) IV Continuous <Continuous>  diltiazem    Tablet 30 milliGRAM(s) Oral every 6 hours  docusate sodium 100 milliGRAM(s) Oral three times a day  famotidine    Tablet 20 milliGRAM(s) Oral two times a day  fluticasone propionate 50 MICROgram(s)/spray Nasal Spray 1 Spray(s) Both Nostrils two times a day  heparin  Injectable 5000 Unit(s) SubCutaneous every 8 hours  insulin glargine Injectable (LANTUS) 20 Unit(s) SubCutaneous every morning  insulin lispro (HumaLOG) corrective regimen sliding scale   SubCutaneous three times a day before meals  insulin lispro Injectable (HumaLOG) 4 Unit(s) SubCutaneous before breakfast  ipratropium    for Nebulization 500 MICROGram(s) Nebulizer every 6 hours  levothyroxine 112 MICROGram(s) Oral daily  magnesium sulfate  IVPB 1 Gram(s) IV Intermittent every 12 hours  meperidine     Injectable 25 milliGRAM(s) IV Push once  metoprolol tartrate 25 milliGRAM(s) Oral two times a day  polyethylene glycol 3350 17 Gram(s) Oral daily  senna 1 Tablet(s) Oral two times a day  tacrolimus 0.5 milliGRAM(s) Oral every 12 hours        VITALS:  T(F): 97 (10-08-19 @ 08:00), Max: 97.7 (10-07-19 @ 12:00)  HR: 60 (10-08-19 @ 08:00)  BP: 115/58 (10-08-19 @ 08:00)  RR: 18 (10-08-19 @ 08:00)  SpO2: 98% (10-08-19 @ 08:00)  Wt(kg): --    10-06 @ 07:01  -  10-07 @ 07:00  --------------------------------------------------------  IN: 365 mL / OUT: 1035 mL / NET: -670 mL    10-07 @ 07:01  -  10-08 @ 07:00  --------------------------------------------------------  IN: 1806.6 mL / OUT: 1300 mL / NET: 506.6 mL          PHYSICAL EXAM:  Constitutional: NAD  HEENT: anicteric sclera, oropharynx clear, MMM  Neck: No JVD  Respiratory: CTAB, no wheezes, rales or rhonchi  Cardiovascular: S1, S2, RRR  Gastrointestinal: BS+, soft, NT/ND  Extremities: No cyanosis or clubbing. No peripheral edema  :  No suarez.   Skin: No rashes    LABS:  10-08    131<L>  |  98  |  33<H>  ----------------------------<  93  5.0   |  24  |  1.0  Creatinine Trend: 1.0<--, 1.2<--, 1.3<--, 1.5<--, 1.5<--, 1.5<--  SODIUM TREND:  Sodium 131 [10-08 @ 03:00]  Sodium 130 [10-07 @ 02:00]  Sodium 131 [10-06 @ 02:00]  Sodium 130 [10-05 @ 12:50]  Sodium 131 [10-05 @ 02:00]  Sodium 130 [10-04 @ 18:30]  Sodium 135 [10-04 @ 01:00]  Sodium 142 [10-03 @ 02:50]  Sodium 138 [10-02 @ 17:30]  Sodium 137 [10-02 @ 05:59]    Ca    9.6      08 Oct 2019 03:00  Mg     2.4     10-08    TPro  5.6<L>  /  Alb  3.7  /  TBili  0.9  /  DBili      /  AST  13  /  ALT  10  /  AlkPhos  114  10-08                          10.6   8.71  )-----------( 206      ( 08 Oct 2019 03:00 )             32.0     Tacrolimus (), Serum: 6.2: Tacrolimus testing is performed on the [employer]  by  chemiluminescent microparticle immunoassay. The therapeutic range of  tacrolimus is not clearly defined but target 12-hour trough whole blood  concentrations are 5.0 - 20.0 ng/mL early post transplant. Twenty-four  hour  trough concentrations are 33-50% less than the corresponding 12-hour  trough. ng/mL (10.07.19 @ 02:00)        Urine Studies:  Urinalysis Basic - ( 02 Oct 2019 00:00 )    Color: Colorless / Appearance: Clear / S.010 / pH:   Gluc:  / Ketone: Negative  / Bili: Negative / Urobili: <2 mg/dL   Blood:  / Protein: Trace / Nitrite: Negative   Leuk Esterase: Negative / RBC:  / WBC    Sq Epi:  / Non Sq Epi:  / Bacteria:         RADIOLOGY & ADDITIONAL STUDIES:

## 2019-10-09 LAB
ALBUMIN SERPL ELPH-MCNC: 3.8 G/DL — SIGNIFICANT CHANGE UP (ref 3.5–5.2)
ALP SERPL-CCNC: 98 U/L — SIGNIFICANT CHANGE UP (ref 30–115)
ALT FLD-CCNC: 9 U/L — SIGNIFICANT CHANGE UP (ref 0–41)
ANION GAP SERPL CALC-SCNC: 10 MMOL/L — SIGNIFICANT CHANGE UP (ref 7–14)
AST SERPL-CCNC: 11 U/L — SIGNIFICANT CHANGE UP (ref 0–41)
BASOPHILS # BLD AUTO: 0.03 K/UL — SIGNIFICANT CHANGE UP (ref 0–0.2)
BASOPHILS NFR BLD AUTO: 0.3 % — SIGNIFICANT CHANGE UP (ref 0–1)
BILIRUB SERPL-MCNC: 1 MG/DL — SIGNIFICANT CHANGE UP (ref 0.2–1.2)
BUN SERPL-MCNC: 30 MG/DL — HIGH (ref 10–20)
CALCIUM SERPL-MCNC: 10 MG/DL — SIGNIFICANT CHANGE UP (ref 8.5–10.1)
CHLORIDE SERPL-SCNC: 99 MMOL/L — SIGNIFICANT CHANGE UP (ref 98–110)
CO2 SERPL-SCNC: 24 MMOL/L — SIGNIFICANT CHANGE UP (ref 17–32)
CREAT SERPL-MCNC: 1 MG/DL — SIGNIFICANT CHANGE UP (ref 0.7–1.5)
EOSINOPHIL # BLD AUTO: 0.27 K/UL — SIGNIFICANT CHANGE UP (ref 0–0.7)
EOSINOPHIL NFR BLD AUTO: 2.8 % — SIGNIFICANT CHANGE UP (ref 0–8)
GLUCOSE BLDC GLUCOMTR-MCNC: 127 MG/DL — HIGH (ref 70–99)
GLUCOSE BLDC GLUCOMTR-MCNC: 179 MG/DL — HIGH (ref 70–99)
GLUCOSE BLDC GLUCOMTR-MCNC: 81 MG/DL — SIGNIFICANT CHANGE UP (ref 70–99)
GLUCOSE BLDC GLUCOMTR-MCNC: 89 MG/DL — SIGNIFICANT CHANGE UP (ref 70–99)
GLUCOSE SERPL-MCNC: 143 MG/DL — HIGH (ref 70–99)
HCT VFR BLD CALC: 30.7 % — LOW (ref 37–47)
HGB BLD-MCNC: 10.1 G/DL — LOW (ref 12–16)
IMM GRANULOCYTES NFR BLD AUTO: 1.4 % — HIGH (ref 0.1–0.3)
INR BLD: 1.5 RATIO — HIGH (ref 0.65–1.3)
LYMPHOCYTES # BLD AUTO: 1.47 K/UL — SIGNIFICANT CHANGE UP (ref 1.2–3.4)
LYMPHOCYTES # BLD AUTO: 15.4 % — LOW (ref 20.5–51.1)
MAGNESIUM SERPL-MCNC: 2.7 MG/DL — HIGH (ref 1.8–2.4)
MCHC RBC-ENTMCNC: 29.4 PG — SIGNIFICANT CHANGE UP (ref 27–31)
MCHC RBC-ENTMCNC: 32.9 G/DL — SIGNIFICANT CHANGE UP (ref 32–37)
MCV RBC AUTO: 89.2 FL — SIGNIFICANT CHANGE UP (ref 81–99)
MONOCYTES # BLD AUTO: 1.13 K/UL — HIGH (ref 0.1–0.6)
MONOCYTES NFR BLD AUTO: 11.9 % — HIGH (ref 1.7–9.3)
NEUTROPHILS # BLD AUTO: 6.5 K/UL — SIGNIFICANT CHANGE UP (ref 1.4–6.5)
NEUTROPHILS NFR BLD AUTO: 68.2 % — SIGNIFICANT CHANGE UP (ref 42.2–75.2)
NRBC # BLD: 0 /100 WBCS — SIGNIFICANT CHANGE UP (ref 0–0)
PLATELET # BLD AUTO: 227 K/UL — SIGNIFICANT CHANGE UP (ref 130–400)
POTASSIUM SERPL-MCNC: 5.4 MMOL/L — HIGH (ref 3.5–5)
POTASSIUM SERPL-SCNC: 5.4 MMOL/L — HIGH (ref 3.5–5)
PROT SERPL-MCNC: 5.8 G/DL — LOW (ref 6–8)
PROTHROM AB SERPL-ACNC: 17.2 SEC — HIGH (ref 9.95–12.87)
RBC # BLD: 3.44 M/UL — LOW (ref 4.2–5.4)
RBC # FLD: 13.9 % — SIGNIFICANT CHANGE UP (ref 11.5–14.5)
SODIUM SERPL-SCNC: 133 MMOL/L — LOW (ref 135–146)
WBC # BLD: 9.53 K/UL — SIGNIFICANT CHANGE UP (ref 4.8–10.8)
WBC # FLD AUTO: 9.53 K/UL — SIGNIFICANT CHANGE UP (ref 4.8–10.8)

## 2019-10-09 PROCEDURE — 99233 SBSQ HOSP IP/OBS HIGH 50: CPT

## 2019-10-09 PROCEDURE — 93010 ELECTROCARDIOGRAM REPORT: CPT

## 2019-10-09 PROCEDURE — 71045 X-RAY EXAM CHEST 1 VIEW: CPT | Mod: 26

## 2019-10-09 RX ORDER — WARFARIN SODIUM 2.5 MG/1
2 TABLET ORAL ONCE
Refills: 0 | Status: COMPLETED | OUTPATIENT
Start: 2019-10-09 | End: 2019-10-09

## 2019-10-09 RX ORDER — TACROLIMUS 5 MG/1
1 CAPSULE ORAL EVERY 12 HOURS
Refills: 0 | Status: DISCONTINUED | OUTPATIENT
Start: 2019-10-09 | End: 2019-10-09

## 2019-10-09 RX ORDER — FUROSEMIDE 40 MG
40 TABLET ORAL ONCE
Refills: 0 | Status: COMPLETED | OUTPATIENT
Start: 2019-10-09 | End: 2019-10-09

## 2019-10-09 RX ORDER — TACROLIMUS 5 MG/1
0.5 CAPSULE ORAL ONCE
Refills: 0 | Status: COMPLETED | OUTPATIENT
Start: 2019-10-09 | End: 2019-10-09

## 2019-10-09 RX ORDER — TACROLIMUS 5 MG/1
0.5 CAPSULE ORAL EVERY 12 HOURS
Refills: 0 | Status: DISCONTINUED | OUTPATIENT
Start: 2019-10-09 | End: 2019-10-15

## 2019-10-09 RX ORDER — TEMAZEPAM 15 MG/1
7.5 CAPSULE ORAL ONCE
Refills: 0 | Status: DISCONTINUED | OUTPATIENT
Start: 2019-10-09 | End: 2019-10-09

## 2019-10-09 RX ORDER — LANOLIN ALCOHOL/MO/W.PET/CERES
5 CREAM (GRAM) TOPICAL ONCE
Refills: 0 | Status: COMPLETED | OUTPATIENT
Start: 2019-10-09 | End: 2019-10-09

## 2019-10-09 RX ORDER — ASPIRIN/CALCIUM CARB/MAGNESIUM 324 MG
81 TABLET ORAL DAILY
Refills: 0 | Status: DISCONTINUED | OUTPATIENT
Start: 2019-10-09 | End: 2019-10-14

## 2019-10-09 RX ADMIN — INSULIN GLARGINE 20 UNIT(S): 100 INJECTION, SOLUTION SUBCUTANEOUS at 07:29

## 2019-10-09 RX ADMIN — SENNA PLUS 1 TABLET(S): 8.6 TABLET ORAL at 05:26

## 2019-10-09 RX ADMIN — Medication 325 MILLIGRAM(S): at 11:32

## 2019-10-09 RX ADMIN — Medication 100 MILLIGRAM(S): at 05:26

## 2019-10-09 RX ADMIN — ATORVASTATIN CALCIUM 10 MILLIGRAM(S): 80 TABLET, FILM COATED ORAL at 22:14

## 2019-10-09 RX ADMIN — TACROLIMUS 0.5 MILLIGRAM(S): 5 CAPSULE ORAL at 05:26

## 2019-10-09 RX ADMIN — Medication 30 MILLIGRAM(S): at 11:32

## 2019-10-09 RX ADMIN — SENNA PLUS 1 TABLET(S): 8.6 TABLET ORAL at 18:19

## 2019-10-09 RX ADMIN — Medication 100 GRAM(S): at 05:26

## 2019-10-09 RX ADMIN — HEPARIN SODIUM 5000 UNIT(S): 5000 INJECTION INTRAVENOUS; SUBCUTANEOUS at 14:12

## 2019-10-09 RX ADMIN — TACROLIMUS 0.5 MILLIGRAM(S): 5 CAPSULE ORAL at 09:32

## 2019-10-09 RX ADMIN — Medication 25 MILLIGRAM(S): at 18:19

## 2019-10-09 RX ADMIN — Medication 100 MILLIGRAM(S): at 14:12

## 2019-10-09 RX ADMIN — Medication 30 MILLIGRAM(S): at 18:18

## 2019-10-09 RX ADMIN — WARFARIN SODIUM 2 MILLIGRAM(S): 2.5 TABLET ORAL at 22:14

## 2019-10-09 RX ADMIN — Medication 100 GRAM(S): at 18:21

## 2019-10-09 RX ADMIN — OXYCODONE HYDROCHLORIDE 10 MILLIGRAM(S): 5 TABLET ORAL at 05:44

## 2019-10-09 RX ADMIN — Medication 25 MILLIGRAM(S): at 05:26

## 2019-10-09 RX ADMIN — HEPARIN SODIUM 5000 UNIT(S): 5000 INJECTION INTRAVENOUS; SUBCUTANEOUS at 05:27

## 2019-10-09 RX ADMIN — Medication 4 UNIT(S): at 07:28

## 2019-10-09 RX ADMIN — OXYCODONE HYDROCHLORIDE 10 MILLIGRAM(S): 5 TABLET ORAL at 07:00

## 2019-10-09 RX ADMIN — Medication 30 MILLIGRAM(S): at 05:26

## 2019-10-09 RX ADMIN — Medication 1: at 07:28

## 2019-10-09 RX ADMIN — Medication 5 MILLIGRAM(S): at 01:52

## 2019-10-09 RX ADMIN — FAMOTIDINE 20 MILLIGRAM(S): 10 INJECTION INTRAVENOUS at 18:18

## 2019-10-09 RX ADMIN — Medication 100 MILLIGRAM(S): at 22:14

## 2019-10-09 RX ADMIN — POLYETHYLENE GLYCOL 3350 17 GRAM(S): 17 POWDER, FOR SOLUTION ORAL at 11:32

## 2019-10-09 RX ADMIN — Medication 40 MILLIGRAM(S): at 11:33

## 2019-10-09 RX ADMIN — Medication 112 MICROGRAM(S): at 05:26

## 2019-10-09 RX ADMIN — TACROLIMUS 0.5 MILLIGRAM(S): 5 CAPSULE ORAL at 18:19

## 2019-10-09 RX ADMIN — FAMOTIDINE 20 MILLIGRAM(S): 10 INJECTION INTRAVENOUS at 05:26

## 2019-10-09 RX ADMIN — Medication 30 MILLIGRAM(S): at 23:57

## 2019-10-09 RX ADMIN — Medication 1 APPLICATION(S): at 18:18

## 2019-10-09 RX ADMIN — HEPARIN SODIUM 5000 UNIT(S): 5000 INJECTION INTRAVENOUS; SUBCUTANEOUS at 22:15

## 2019-10-09 RX ADMIN — Medication 500 MICROGRAM(S): at 02:00

## 2019-10-09 RX ADMIN — Medication 1 APPLICATION(S): at 05:26

## 2019-10-09 NOTE — PROGRESS NOTE ADULT - SUBJECTIVE AND OBJECTIVE BOX
CTU Attending Progress Daily Note     09 Oct 2019 12:52  POD# - 7  He has history of Myocardial infarction  HTN (hypertension)  SOB (shortness of breath)  Hypothyroid  Liver transplant status  Diabetes    Interval event for past 24 hr:  NEPTALI ANNA  79y had no event.     paced still in A fib  Current Complains:  NEPTALI ANNA has no new complains  HPI:    OBJECTIVE:  ICU Vital Signs Last 24 Hrs  T(C): 36 (09 Oct 2019 08:51), Max: 36.3 (08 Oct 2019 16:00)  T(F): 96.8 (09 Oct 2019 08:51), Max: 97.3 (08 Oct 2019 16:00)  HR: 62 (09 Oct 2019 11:42) (60 - 80)  BP: 167/3 (09 Oct 2019 11:42) (128/59 - 177/77)  BP(mean): 105 (09 Oct 2019 11:42) (85 - 126)  ABP: --  ABP(mean): --  RR: 20 (09 Oct 2019 11:42) (18 - 22)  SpO2: 97% (09 Oct 2019 11:42) (93% - 100%)    I&O's Summary    08 Oct 2019 07:01  -  09 Oct 2019 07:00  --------------------------------------------------------  IN: 1400 mL / OUT: 500 mL / NET: 900 mL    09 Oct 2019 07:01  -  09 Oct 2019 12:52  --------------------------------------------------------  IN: 240 mL / OUT: 200 mL / NET: 40 mL      I&O's Detail    08 Oct 2019 07:01  -  09 Oct 2019 07:00  --------------------------------------------------------  IN:    IV PiggyBack: 100 mL    Oral Fluid: 1300 mL  Total IN: 1400 mL    OUT:    Voided: 500 mL  Total OUT: 500 mL    Total NET: 900 mL      09 Oct 2019 07:01  -  09 Oct 2019 12:52  --------------------------------------------------------  IN:    Oral Fluid: 240 mL  Total IN: 240 mL    OUT:    Voided: 200 mL  Total OUT: 200 mL    Total NET: 40 mL            CAPILLARY BLOOD GLUCOSE  166 (08 Oct 2019 16:00)      POCT Blood Glucose.: 127 mg/dL (09 Oct 2019 11:29)  POCT Blood Glucose.: 179 mg/dL (09 Oct 2019 06:33)  POCT Blood Glucose.: 148 mg/dL (08 Oct 2019 21:05)  POCT Blood Glucose.: 166 mg/dL (08 Oct 2019 16:29)    LABS:                          10.1   9.53  )-----------( 227      ( 09 Oct 2019 01:15 )             30.7     10-09    133<L>  |  99  |  30<H>  ----------------------------<  143<H>  5.4<H>   |  24  |  1.0    Ca    10.0      09 Oct 2019 01:15  Mg     2.7     10-09    TPro  5.8<L>  /  Alb  3.8  /  TBili  1.0  /  DBili  x   /  AST  11  /  ALT  9   /  AlkPhos  98  10-09    PT/INR - ( 09 Oct 2019 09:00 )   PT: 17.20 sec;   INR: 1.50 ratio               Home Medications:  Ambien 5 mg oral tablet: 1 tab(s) orally once a day (at bedtime) (24 Sep 2019 10:23)  aspirin 81 mg oral tablet: 1 tab(s) orally once a day (24 Sep 2019 10:23)  insulin aspart: 4 UNITS subcutaneous 3 times a day (before meals) BASED ON BLOOD SUGAR (24 Sep 2019 10:23)  Lantus: 18 unit(s) subcutaneous once a day (at bedtime) (24 Sep 2019 10:23)  Lopressor 50 mg oral tablet: 1 tab(s) orally 2 times a day (24 Sep 2019 10:23)  NIFEdipine 90 mg oral tablet, extended release: 1 tab(s) orally once a day (24 Sep 2019 10:23)  Prograf 1 mg oral capsule: 1 cap(s) orally every 12 hours (24 Sep 2019 10:23)  Synthroid 112 mcg (0.112 mg) oral tablet: 1 tab(s) orally once a day (24 Sep 2019 10:23)    HOSPITAL MEDICATIONS:  MEDICATIONS  (STANDING):  aspirin enteric coated 81 milliGRAM(s) Oral daily  atorvastatin 10 milliGRAM(s) Oral at bedtime  BACItracin   Ointment 1 Application(s) Topical two times a day  dextrose 5%. 1000 milliLiter(s) (50 mL/Hr) IV Continuous <Continuous>  dextrose 50% Injectable 50 milliLiter(s) IV Push every 15 minutes  dextrose 50% Injectable 25 milliLiter(s) IV Push every 15 minutes  diltiazem    Tablet 30 milliGRAM(s) Oral every 6 hours  docusate sodium 100 milliGRAM(s) Oral three times a day  famotidine    Tablet 20 milliGRAM(s) Oral two times a day  fluticasone propionate 50 MICROgram(s)/spray Nasal Spray 1 Spray(s) Both Nostrils two times a day  heparin  Injectable 5000 Unit(s) SubCutaneous every 8 hours  insulin glargine Injectable (LANTUS) 20 Unit(s) SubCutaneous every morning  insulin lispro (HumaLOG) corrective regimen sliding scale   SubCutaneous three times a day before meals  insulin lispro Injectable (HumaLOG) 4 Unit(s) SubCutaneous before breakfast  ipratropium    for Nebulization 500 MICROGram(s) Nebulizer every 6 hours  levothyroxine 112 MICROGram(s) Oral daily  magnesium sulfate  IVPB 1 Gram(s) IV Intermittent every 12 hours  metoprolol tartrate 25 milliGRAM(s) Oral two times a day  polyethylene glycol 3350 17 Gram(s) Oral daily  senna 1 Tablet(s) Oral two times a day  tacrolimus 0.5 milliGRAM(s) Oral every 12 hours  warfarin 2 milliGRAM(s) Oral once    MEDICATIONS  (PRN):  dextrose 40% Gel 15 Gram(s) Oral once PRN Blood Glucose LESS THAN 70 milliGRAM(s)/deciliter  glucagon  Injectable 1 milliGRAM(s) IntraMuscular once PRN Glucose LESS THAN 70 milligrams/deciliter      REVIEW OF SYSTEMS:  CONSTITUTIONAL: [X] all negative; [ ] weakness, [ ] fevers, [ ] chills  EYES/ENT: [X] all negative; [ ] visual changes, [ ] vertigo, [ ] throat pain   NECK: [X] all negative; [ ] pain, [ ] stiffness  RESPIRATORY: [] all negative, [ ] cough, [ ] wheezing, [ ] hemoptysis, [ ] shortness of breath  CARDIOVASCULAR: [] all negative; [ ] chest pain, [ ] palpitations, [ ] orthopnea  GASTROINTESTINAL: [X] all negative; [ ]abdominal pain, [ ] nausea, [ ] vomiting, [ ] hematemesis, [ ] diarrhea, [ ] constipation, [ ] melena, [ ] hematochezia.  GENITOURINARY: [X] all negative; [ ] dysuria, [ ] frequency, [ ] hematuria  NEUROLOGICAL: [X] all negative; [ ] numbness, [ ] weakness  SKIN: [X] all negative; [ ] itching, [ ] burning, [ ] rashes, [ ] lesions   All other review of systems is negative unless indicated above.    [  ] Unable to assess ROS because     PHYSICAL EXAM:          CONSTITUTIONAL: Well-developed; well-nourished; in no acute distress.   	SKIN: warm, dry  	HEAD: Normocephalic; atraumatic.  	EYES: PERRL, EOM, no conj injection, sclera clear  	ENT: No nasal discharge; airway clear.  	NECK: Supple; non tender.  No midline ttp ctls  	CARD: S1, S2 normal; no murmurs, gallops, or rubs. Regular rate and rhythm. 2+ RPs and DPs bilat, no carotid bruits, no pedal   edema, no calf pain b/l  	RESP: CTA  bilat good air movement No wheezes, rales or rhonchi.  	ABD: Soft, not tender, not distended, no CVA ttp no rebound or guarding, bowel sounds present  	EXT: Normal ROM.  No clubbing, cyanosis or edema.   	  	NEURO: Alert, awake, motor 5/5 R, 5/5 L        RADIOLOGY:  xray    Mild congestion

## 2019-10-09 NOTE — PROGRESS NOTE ADULT - ASSESSMENT
Assessment/Plan:  CAD-s/p CABG x 5-POD #7  1-BP control- beta-blockers and cardiazm 30 q6  2-serum glucose control-insulin infusion d/c change to lantus and novlog  3-acute blood loss anemia-transfused 2 units pRBCs-f/u repeat CBC  0-gmtjujybqhshorwp-vhzyoc, continue to monitor plts daily  5-hx liver transplant- Tacrolimus  0.5 q12  check level in am  7-zhhmnkwgano-yugozehz synthroid  7-  A fib on  cardiazm  po and coumadin INR pending   pt refused cardioversion again  discussed with GI ok to start amio drip and d/c on amio with LFT f/u     - pulmonary congestion lasix 40 x1 given

## 2019-10-09 NOTE — PROGRESS NOTE ADULT - SUBJECTIVE AND OBJECTIVE BOX
OPERATIVE PROCEDURE(s):                POD #                       79yFemale  SURGEON(s): JUAREZ Mirza  SUBJECTIVE ASSESSMENT:  Patient has no complaints at this time.    Vital Signs Last 24 Hrs  T(F): 97.3 (08 Oct 2019 16:00), Max: 97.3 (08 Oct 2019 16:00)  HR: 60 (09 Oct 2019 06:17) (60 - 80) A-Paced by PPM  BP: 147/63 (09 Oct 2019 06:17) (128/59 - 177/77)  BP(mean): 90 (09 Oct 2019 06:17) (85 - 126)  RR: 18 (09 Oct 2019 06:17) (18 - 22)  SpO2: 100% (09 Oct 2019 06:17) (95% - 100%) RA      I&O's Detail    08 Oct 2019 07:01  -  09 Oct 2019 07:00  --------------------------------------------------------  IN:    IV PiggyBack: 100 mL    Oral Fluid: 1300 mL  Total IN: 1400 mL    OUT:    Voided: 500 mL  Total OUT: 500 mL        Net:   I&O's Detail    07 Oct 2019 07:01  -  08 Oct 2019 07:00  --------------------------------------------------------  Total NET: 506.6 mL      08 Oct 2019 07:01  -  09 Oct 2019 07:00  --------------------------------------------------------  Total NET: 900 mL        CAPILLARY BLOOD GLUCOSE  166 (08 Oct 2019 16:00)  163 (08 Oct 2019 12:00)      POCT Blood Glucose.: 179 mg/dL (09 Oct 2019 06:33)  POCT Blood Glucose.: 148 mg/dL (08 Oct 2019 21:05)  POCT Blood Glucose.: 166 mg/dL (08 Oct 2019 16:29)  POCT Blood Glucose.: 163 mg/dL (08 Oct 2019 11:02)    Physical Exam:  General: NAD; A&Ox3  Cardiac: S1/S2, RRR, no murmur, no rubs  Lungs: unlabored respirations, CTA b/l, no wheeze, no rales, no crackles  Abdomen: Soft/NT/ND; positive bowel sounds x 4  Sternum: Intact, no click, incision healing well with no drainage  Incisions: Incisions clean/dry/intact  Extremities: No edema b/l lower extremities; good capillary refill; no cyanosis; palpable 1+ pedal pulses b/l        LABS:                        10.1<L>  9.53  )-----------( 227      ( 09 Oct 2019 01:15 )             30.7<L>                        10.6<L>  8.71  )-----------( 206      ( 08 Oct 2019 03:00 )             32.0<L>    10-09    133<L>  |  99  |  30<H>  ----------------------------<  143<H>  5.4<H>   |  24  |  1.0  10-08    131<L>  |  98  |  33<H>  ----------------------------<  93  5.0   |  24  |  1.0    Ca    10.0      09 Oct 2019 01:15  Mg     2.7     10-09    TPro  5.8<L> [6.0 - 8.0]  /  Alb  3.8 [3.5 - 5.2]  /  TBili  1.0 [0.2 - 1.2]  /  DBili  x   /  AST  11 [0 - 41]  /  ALT  9 [0 - 41]  /  AlkPhos  98 [30 - 115]  10-09    PT/INR - ( 07 Oct 2019 19:30 )   PT: ;   INR: 1.34 ratio               RADIOLOGY & ADDITIONAL TESTS:  CXR:   EKG:  MEDICATIONS  (STANDING):  aspirin 325 milliGRAM(s) Oral daily  atorvastatin 10 milliGRAM(s) Oral at bedtime  BACItracin   Ointment 1 Application(s) Topical two times a day  dextrose 5%. 1000 milliLiter(s) (50 mL/Hr) IV Continuous <Continuous>  dextrose 50% Injectable 50 milliLiter(s) IV Push every 15 minutes  dextrose 50% Injectable 25 milliLiter(s) IV Push every 15 minutes  diltiazem    Tablet 30 milliGRAM(s) Oral every 6 hours  docusate sodium 100 milliGRAM(s) Oral three times a day  famotidine    Tablet 20 milliGRAM(s) Oral two times a day  fluticasone propionate 50 MICROgram(s)/spray Nasal Spray 1 Spray(s) Both Nostrils two times a day  heparin  Injectable 5000 Unit(s) SubCutaneous every 8 hours  insulin glargine Injectable (LANTUS) 20 Unit(s) SubCutaneous every morning  insulin lispro (HumaLOG) corrective regimen sliding scale   SubCutaneous three times a day before meals  insulin lispro Injectable (HumaLOG) 4 Unit(s) SubCutaneous before breakfast  ipratropium    for Nebulization 500 MICROGram(s) Nebulizer every 6 hours  levothyroxine 112 MICROGram(s) Oral daily  magnesium sulfate  IVPB 1 Gram(s) IV Intermittent every 12 hours  meperidine     Injectable 25 milliGRAM(s) IV Push once  metoprolol tartrate 25 milliGRAM(s) Oral two times a day  polyethylene glycol 3350 17 Gram(s) Oral daily  senna 1 Tablet(s) Oral two times a day  tacrolimus 0.5 milliGRAM(s) Oral every 12 hours    MEDICATIONS  (PRN):  dextrose 40% Gel 15 Gram(s) Oral once PRN Blood Glucose LESS THAN 70 milliGRAM(s)/deciliter  glucagon  Injectable 1 milliGRAM(s) IntraMuscular once PRN Glucose LESS THAN 70 milligrams/deciliter  oxyCODONE    IR 10 milliGRAM(s) Oral every 4 hours PRN Severe Pain (7 - 10)    HEPARIN:  [] YES [] NO  Dose: XX UNITS/HR UNITS Q8H  LOVENOX:[] YES [] NO  Dose: XX mg Q24H  COUMADIN: []  YES [] NO  Dose: XX mg  Q24H  SCD's: YES b/l  GI Prophylaxis: Protonix [], Pepcid [], None [], (Contra-indication:.....)    Post-Op Aspirin: Yes [],  No [], If No, then contra-indication:  Post-Op Statin: Yes [], No[], If No, then contra-indication:  Post-Op Beta-Blockers: Yes [], No [], If No, then contra-indication:    Allergies:  No Known Drug Allergies  TEGADERM (Rash)      Ambulation/Activity Status: Ambulates several times daily without assistance.    Assessment/Plan:  79y Female status-post .....  - Case and plan discussed with CTU Intensivist and CT Surgeon - Dr. Leon/Yuki/Martin   - Continue CTU supportive care    - Continue DVT/GI prophylaxis  - Incentive Spirometry 10 times an hour  - Continue to advance physical activity as tolerated and continue PT/OT as directed  1. CAD: Continue ASA, statin, BB  2. HTN:   3. A. Fib:   4. COPD/Hypoxia:   5. DM/Glucose Control:     Social Service Disposition: OPERATIVE PROCEDURE(s): CABG x 5              POD # 7                      79yFemale  SURGEON(s): JUAREZ Mirza  SUBJECTIVE ASSESSMENT:  Patient has no complaints at this time.    Vital Signs Last 24 Hrs  T(F): 97.3 (08 Oct 2019 16:00), Max: 97.3 (08 Oct 2019 16:00)  HR: 60 (09 Oct 2019 06:17) (60 - 80) A-Paced by PPM  BP: 147/63 (09 Oct 2019 06:17) (128/59 - 177/77)  BP(mean): 90 (09 Oct 2019 06:17) (85 - 126)  RR: 18 (09 Oct 2019 06:17) (18 - 22)  SpO2: 100% (09 Oct 2019 06:17) (95% - 100%) RA      I&O's Detail    08 Oct 2019 07:01  -  09 Oct 2019 07:00  --------------------------------------------------------  IN:    IV PiggyBack: 100 mL    Oral Fluid: 1300 mL  Total IN: 1400 mL    OUT:    Voided: 500 mL  Total OUT: 500 mL        Net:   I&O's Detail    07 Oct 2019 07:01  -  08 Oct 2019 07:00  --------------------------------------------------------  Total NET: 506.6 mL      08 Oct 2019 07:01  -  09 Oct 2019 07:00  --------------------------------------------------------  Total NET: 900 mL        CAPILLARY BLOOD GLUCOSE  166 (08 Oct 2019 16:00)  163 (08 Oct 2019 12:00)      POCT Blood Glucose.: 179 mg/dL (09 Oct 2019 06:33)  POCT Blood Glucose.: 148 mg/dL (08 Oct 2019 21:05)  POCT Blood Glucose.: 166 mg/dL (08 Oct 2019 16:29)  POCT Blood Glucose.: 163 mg/dL (08 Oct 2019 11:02)    Physical Exam:  General: NAD; A&Ox3  Cardiac: S1/S2, RRR, no murmur, no rubs  Lungs: unlabored respirations, CTA b/l, no wheeze, no rales, no crackles  Abdomen: Soft/NT/ND; positive bowel sounds x 4  Sternum: Intact, no click, incision healing well with no drainage  Incisions: Incisions clean/dry/intact  Extremities: No edema b/l lower extremities; good capillary refill; no cyanosis; palpable 1+ pedal pulses b/l        LABS:                        10.1<L>  9.53  )-----------( 227      ( 09 Oct 2019 01:15 )             30.7<L>                        10.6<L>  8.71  )-----------( 206      ( 08 Oct 2019 03:00 )             32.0<L>    10-09    133<L>  |  99  |  30<H>  ----------------------------<  143<H>  5.4<H>   |  24  |  1.0  10-08    131<L>  |  98  |  33<H>  ----------------------------<  93  5.0   |  24  |  1.0    Ca    10.0      09 Oct 2019 01:15  Mg     2.7     10-09    TPro  5.8<L> [6.0 - 8.0]  /  Alb  3.8 [3.5 - 5.2]  /  TBili  1.0 [0.2 - 1.2]  /  DBili  x   /  AST  11 [0 - 41]  /  ALT  9 [0 - 41]  /  AlkPhos  98 [30 - 115]  10-09    PT/INR - ( 07 Oct 2019 19:30 )   PT: ;   INR: 1.34 ratio           MEDICATIONS  (STANDING):  aspirin 325 milliGRAM(s) Oral daily  atorvastatin 10 milliGRAM(s) Oral at bedtime  BACItracin   Ointment 1 Application(s) Topical two times a day  dextrose 5%. 1000 milliLiter(s) (50 mL/Hr) IV Continuous <Continuous>  dextrose 50% Injectable 50 milliLiter(s) IV Push every 15 minutes  dextrose 50% Injectable 25 milliLiter(s) IV Push every 15 minutes  diltiazem    Tablet 30 milliGRAM(s) Oral every 6 hours  docusate sodium 100 milliGRAM(s) Oral three times a day  famotidine    Tablet 20 milliGRAM(s) Oral two times a day  fluticasone propionate 50 MICROgram(s)/spray Nasal Spray 1 Spray(s) Both Nostrils two times a day  heparin  Injectable 5000 Unit(s) SubCutaneous every 8 hours  insulin glargine Injectable (LANTUS) 20 Unit(s) SubCutaneous every morning  insulin lispro (HumaLOG) corrective regimen sliding scale   SubCutaneous three times a day before meals  insulin lispro Injectable (HumaLOG) 4 Unit(s) SubCutaneous before breakfast  ipratropium    for Nebulization 500 MICROGram(s) Nebulizer every 6 hours  levothyroxine 112 MICROGram(s) Oral daily  magnesium sulfate  IVPB 1 Gram(s) IV Intermittent every 12 hours  meperidine     Injectable 25 milliGRAM(s) IV Push once  metoprolol tartrate 25 milliGRAM(s) Oral two times a day  polyethylene glycol 3350 17 Gram(s) Oral daily  senna 1 Tablet(s) Oral two times a day  tacrolimus 0.5 milliGRAM(s) Oral every 12 hours    MEDICATIONS  (PRN):  dextrose 40% Gel 15 Gram(s) Oral once PRN Blood Glucose LESS THAN 70 milliGRAM(s)/deciliter  glucagon  Injectable 1 milliGRAM(s) IntraMuscular once PRN Glucose LESS THAN 70 milligrams/deciliter  oxyCODONE    IR 10 milliGRAM(s) Oral every 4 hours PRN Severe Pain (7 - 10)    HEPARIN:  [x] YES [] NO  Dose: 5000 UNITS Q8H  SCD's: YES b/l  GI Prophylaxis: Protonix [], Pepcid [x], None [], (Contra-indication:.....)    Post-Op Aspirin: Yes [x],  No [], If No, then contra-indication:  Post-Op Statin: Yes [x], No[], If No, then contra-indication:  Post-Op Beta-Blockers: Yes [x], No [], If No, then contra-indication:    Allergies:  No Known Drug Allergies  TEGADERM (Rash)      Ambulation/Activity Status: Ambulates several times daily without assistance.    Assessment/Plan:  79y Female status-post   - Case and plan discussed with CTU Intensivist and CT Surgeon - Dr. Leon/Yuki/Martin   - Continue CTU supportive care    - Continue DVT/GI prophylaxis  - Incentive Spirometry 10 times an hour  - Continue to advance physical activity as tolerated and continue PT/OT as directed  1. CAD: Continue ASA, statin, BB  2. CXR PA/LAT in AM  3. As per renal cont Tacrolimus 0.5mg q12h  4. Lasix IV today  5. AF - Coumadin 2mg tonight

## 2019-10-10 LAB
ALBUMIN SERPL ELPH-MCNC: 4 G/DL — SIGNIFICANT CHANGE UP (ref 3.5–5.2)
ALP SERPL-CCNC: 100 U/L — SIGNIFICANT CHANGE UP (ref 30–115)
ALT FLD-CCNC: 9 U/L — SIGNIFICANT CHANGE UP (ref 0–41)
ANION GAP SERPL CALC-SCNC: 14 MMOL/L — SIGNIFICANT CHANGE UP (ref 7–14)
ANISOCYTOSIS BLD QL: SLIGHT — SIGNIFICANT CHANGE UP
APPEARANCE UR: ABNORMAL
AST SERPL-CCNC: 13 U/L — SIGNIFICANT CHANGE UP (ref 0–41)
BACTERIA # UR AUTO: ABNORMAL
BASOPHILS # BLD AUTO: 0.05 K/UL — SIGNIFICANT CHANGE UP (ref 0–0.2)
BASOPHILS NFR BLD AUTO: 0.5 % — SIGNIFICANT CHANGE UP (ref 0–1)
BILIRUB SERPL-MCNC: 1.3 MG/DL — HIGH (ref 0.2–1.2)
BILIRUB UR-MCNC: NEGATIVE — SIGNIFICANT CHANGE UP
BUN SERPL-MCNC: 22 MG/DL — HIGH (ref 10–20)
CALCIUM SERPL-MCNC: 10.4 MG/DL — HIGH (ref 8.5–10.1)
CHLORIDE SERPL-SCNC: 98 MMOL/L — SIGNIFICANT CHANGE UP (ref 98–110)
CO2 SERPL-SCNC: 24 MMOL/L — SIGNIFICANT CHANGE UP (ref 17–32)
COLOR SPEC: YELLOW — SIGNIFICANT CHANGE UP
CREAT SERPL-MCNC: 0.9 MG/DL — SIGNIFICANT CHANGE UP (ref 0.7–1.5)
DIFF PNL FLD: ABNORMAL
EOSINOPHIL # BLD AUTO: 0.45 K/UL — SIGNIFICANT CHANGE UP (ref 0–0.7)
EOSINOPHIL NFR BLD AUTO: 4.4 % — SIGNIFICANT CHANGE UP (ref 0–8)
EPI CELLS # UR: 7 /HPF — HIGH (ref 0–5)
GIANT PLATELETS BLD QL SMEAR: PRESENT — SIGNIFICANT CHANGE UP
GLUCOSE BLDC GLUCOMTR-MCNC: 103 MG/DL — HIGH (ref 70–99)
GLUCOSE BLDC GLUCOMTR-MCNC: 111 MG/DL — HIGH (ref 70–99)
GLUCOSE BLDC GLUCOMTR-MCNC: 129 MG/DL — HIGH (ref 70–99)
GLUCOSE BLDC GLUCOMTR-MCNC: 170 MG/DL — HIGH (ref 70–99)
GLUCOSE BLDC GLUCOMTR-MCNC: 90 MG/DL — SIGNIFICANT CHANGE UP (ref 70–99)
GLUCOSE SERPL-MCNC: 112 MG/DL — HIGH (ref 70–99)
GLUCOSE UR QL: NEGATIVE — SIGNIFICANT CHANGE UP
HCT VFR BLD CALC: 33.1 % — LOW (ref 37–47)
HGB BLD-MCNC: 11 G/DL — LOW (ref 12–16)
HYALINE CASTS # UR AUTO: 3 /LPF — SIGNIFICANT CHANGE UP (ref 0–7)
IMM GRANULOCYTES NFR BLD AUTO: 2.2 % — HIGH (ref 0.1–0.3)
INR BLD: 1.75 RATIO — HIGH (ref 0.65–1.3)
KETONES UR-MCNC: SIGNIFICANT CHANGE UP
LEUKOCYTE ESTERASE UR-ACNC: ABNORMAL
LYMPHOCYTES # BLD AUTO: 1.98 K/UL — SIGNIFICANT CHANGE UP (ref 1.2–3.4)
LYMPHOCYTES # BLD AUTO: 19.3 % — LOW (ref 20.5–51.1)
MAGNESIUM SERPL-MCNC: 2.2 MG/DL — SIGNIFICANT CHANGE UP (ref 1.8–2.4)
MANUAL SMEAR VERIFICATION: SIGNIFICANT CHANGE UP
MCHC RBC-ENTMCNC: 29.4 PG — SIGNIFICANT CHANGE UP (ref 27–31)
MCHC RBC-ENTMCNC: 33.2 G/DL — SIGNIFICANT CHANGE UP (ref 32–37)
MCV RBC AUTO: 88.5 FL — SIGNIFICANT CHANGE UP (ref 81–99)
MONOCYTES # BLD AUTO: 1.13 K/UL — HIGH (ref 0.1–0.6)
MONOCYTES NFR BLD AUTO: 11 % — HIGH (ref 1.7–9.3)
NEUTROPHILS # BLD AUTO: 6.42 K/UL — SIGNIFICANT CHANGE UP (ref 1.4–6.5)
NEUTROPHILS NFR BLD AUTO: 62.6 % — SIGNIFICANT CHANGE UP (ref 42.2–75.2)
NITRITE UR-MCNC: NEGATIVE — SIGNIFICANT CHANGE UP
NRBC # BLD: 0 /100 WBCS — SIGNIFICANT CHANGE UP (ref 0–0)
PH UR: 6 — SIGNIFICANT CHANGE UP (ref 5–8)
PLAT MORPH BLD: ABNORMAL
PLATELET # BLD AUTO: 270 K/UL — SIGNIFICANT CHANGE UP (ref 130–400)
POIKILOCYTOSIS BLD QL AUTO: SLIGHT — SIGNIFICANT CHANGE UP
POLYCHROMASIA BLD QL SMEAR: SIGNIFICANT CHANGE UP
POTASSIUM SERPL-MCNC: 5 MMOL/L — SIGNIFICANT CHANGE UP (ref 3.5–5)
POTASSIUM SERPL-SCNC: 5 MMOL/L — SIGNIFICANT CHANGE UP (ref 3.5–5)
PROT SERPL-MCNC: 6.3 G/DL — SIGNIFICANT CHANGE UP (ref 6–8)
PROT UR-MCNC: ABNORMAL
PROTHROM AB SERPL-ACNC: 20 SEC — HIGH (ref 9.95–12.87)
RBC # BLD: 3.74 M/UL — LOW (ref 4.2–5.4)
RBC # FLD: 14.1 % — SIGNIFICANT CHANGE UP (ref 11.5–14.5)
RBC BLD AUTO: ABNORMAL
RBC CASTS # UR COMP ASSIST: 104 /HPF — HIGH (ref 0–4)
SODIUM SERPL-SCNC: 136 MMOL/L — SIGNIFICANT CHANGE UP (ref 135–146)
SP GR SPEC: 1.01 — SIGNIFICANT CHANGE UP (ref 1.01–1.02)
TACROLIMUS SERPL-MCNC: 7.5 NG/ML — SIGNIFICANT CHANGE UP
UROBILINOGEN FLD QL: SIGNIFICANT CHANGE UP
VARIANT LYMPHS # BLD: 6.1 % — HIGH (ref 0–5)
WBC # BLD: 10.26 K/UL — SIGNIFICANT CHANGE UP (ref 4.8–10.8)
WBC # FLD AUTO: 10.26 K/UL — SIGNIFICANT CHANGE UP (ref 4.8–10.8)
WBC UR QL: >720 /HPF — HIGH (ref 0–5)

## 2019-10-10 PROCEDURE — 71046 X-RAY EXAM CHEST 2 VIEWS: CPT | Mod: 26

## 2019-10-10 PROCEDURE — 99233 SBSQ HOSP IP/OBS HIGH 50: CPT

## 2019-10-10 PROCEDURE — 93010 ELECTROCARDIOGRAM REPORT: CPT

## 2019-10-10 RX ORDER — DILTIAZEM HCL 120 MG
180 CAPSULE, EXT RELEASE 24 HR ORAL DAILY
Refills: 0 | Status: DISCONTINUED | OUTPATIENT
Start: 2019-10-10 | End: 2019-10-11

## 2019-10-10 RX ORDER — ALPRAZOLAM 0.25 MG
0.25 TABLET ORAL ONCE
Refills: 0 | Status: DISCONTINUED | OUTPATIENT
Start: 2019-10-10 | End: 2019-10-10

## 2019-10-10 RX ORDER — CHLORHEXIDINE GLUCONATE 213 G/1000ML
1 SOLUTION TOPICAL
Refills: 0 | Status: DISCONTINUED | OUTPATIENT
Start: 2019-10-10 | End: 2019-10-18

## 2019-10-10 RX ORDER — WARFARIN SODIUM 2.5 MG/1
1 TABLET ORAL ONCE
Refills: 0 | Status: COMPLETED | OUTPATIENT
Start: 2019-10-10 | End: 2019-10-10

## 2019-10-10 RX ORDER — HYDRALAZINE HCL 50 MG
10 TABLET ORAL ONCE
Refills: 0 | Status: COMPLETED | OUTPATIENT
Start: 2019-10-10 | End: 2019-10-10

## 2019-10-10 RX ORDER — FUROSEMIDE 40 MG
40 TABLET ORAL DAILY
Refills: 0 | Status: COMPLETED | OUTPATIENT
Start: 2019-10-10 | End: 2019-10-12

## 2019-10-10 RX ORDER — ZOLPIDEM TARTRATE 10 MG/1
5 TABLET ORAL AT BEDTIME
Refills: 0 | Status: DISCONTINUED | OUTPATIENT
Start: 2019-10-10 | End: 2019-10-17

## 2019-10-10 RX ORDER — WARFARIN SODIUM 2.5 MG/1
1 TABLET ORAL ONCE
Refills: 0 | Status: DISCONTINUED | OUTPATIENT
Start: 2019-10-10 | End: 2019-10-10

## 2019-10-10 RX ADMIN — TACROLIMUS 0.5 MILLIGRAM(S): 5 CAPSULE ORAL at 06:26

## 2019-10-10 RX ADMIN — Medication 112 MICROGRAM(S): at 06:25

## 2019-10-10 RX ADMIN — Medication 100 GRAM(S): at 06:25

## 2019-10-10 RX ADMIN — Medication 0.25 MILLIGRAM(S): at 02:10

## 2019-10-10 RX ADMIN — Medication 1 SPRAY(S): at 18:26

## 2019-10-10 RX ADMIN — HEPARIN SODIUM 5000 UNIT(S): 5000 INJECTION INTRAVENOUS; SUBCUTANEOUS at 06:26

## 2019-10-10 RX ADMIN — Medication 1 TABLET(S): at 17:16

## 2019-10-10 RX ADMIN — WARFARIN SODIUM 1 MILLIGRAM(S): 2.5 TABLET ORAL at 22:35

## 2019-10-10 RX ADMIN — HEPARIN SODIUM 5000 UNIT(S): 5000 INJECTION INTRAVENOUS; SUBCUTANEOUS at 22:36

## 2019-10-10 RX ADMIN — ATORVASTATIN CALCIUM 10 MILLIGRAM(S): 80 TABLET, FILM COATED ORAL at 22:36

## 2019-10-10 RX ADMIN — Medication 100 MILLIGRAM(S): at 22:35

## 2019-10-10 RX ADMIN — Medication 100 MILLIGRAM(S): at 13:50

## 2019-10-10 RX ADMIN — SENNA PLUS 1 TABLET(S): 8.6 TABLET ORAL at 17:15

## 2019-10-10 RX ADMIN — Medication 100 GRAM(S): at 17:16

## 2019-10-10 RX ADMIN — Medication 81 MILLIGRAM(S): at 11:46

## 2019-10-10 RX ADMIN — FAMOTIDINE 20 MILLIGRAM(S): 10 INJECTION INTRAVENOUS at 06:25

## 2019-10-10 RX ADMIN — Medication 1 APPLICATION(S): at 17:14

## 2019-10-10 RX ADMIN — Medication 25 MILLIGRAM(S): at 17:15

## 2019-10-10 RX ADMIN — INSULIN GLARGINE 20 UNIT(S): 100 INJECTION, SOLUTION SUBCUTANEOUS at 08:00

## 2019-10-10 RX ADMIN — HEPARIN SODIUM 5000 UNIT(S): 5000 INJECTION INTRAVENOUS; SUBCUTANEOUS at 13:50

## 2019-10-10 RX ADMIN — Medication 1 APPLICATION(S): at 06:25

## 2019-10-10 RX ADMIN — Medication 1: at 11:47

## 2019-10-10 RX ADMIN — TACROLIMUS 0.5 MILLIGRAM(S): 5 CAPSULE ORAL at 17:15

## 2019-10-10 RX ADMIN — Medication 25 MILLIGRAM(S): at 06:25

## 2019-10-10 RX ADMIN — SENNA PLUS 1 TABLET(S): 8.6 TABLET ORAL at 06:25

## 2019-10-10 RX ADMIN — Medication 10 MILLIGRAM(S): at 01:10

## 2019-10-10 RX ADMIN — FAMOTIDINE 20 MILLIGRAM(S): 10 INJECTION INTRAVENOUS at 17:14

## 2019-10-10 RX ADMIN — Medication 40 MILLIGRAM(S): at 09:36

## 2019-10-10 RX ADMIN — Medication 30 MILLIGRAM(S): at 06:26

## 2019-10-10 RX ADMIN — ZOLPIDEM TARTRATE 5 MILLIGRAM(S): 10 TABLET ORAL at 22:35

## 2019-10-10 RX ADMIN — POLYETHYLENE GLYCOL 3350 17 GRAM(S): 17 POWDER, FOR SOLUTION ORAL at 11:46

## 2019-10-10 RX ADMIN — Medication 100 MILLIGRAM(S): at 06:26

## 2019-10-10 RX ADMIN — Medication 4 UNIT(S): at 08:31

## 2019-10-10 NOTE — PROGRESS NOTE ADULT - SUBJECTIVE AND OBJECTIVE BOX
OPERATIVE PROCEDURE(s):     CABG x 5            POD #   8                    SURGEON(s): JUAREZ Mirza  SUBJECTIVE ASSESSMENT:79y Female patient seen and examined at bedside. Patient denies any acute complaints at this time.     Vital Signs Last 24 Hrs  T(F): 96.5 (10 Oct 2019 08:00), Max: 97 (09 Oct 2019 20:00)  HR: 65 (10 Oct 2019 10:00) (60 - 88)  BP: 120/58 (10 Oct 2019 10:00) (120/58 - 193/82)  BP(mean): 84 (10 Oct 2019 10:00) (84 - 119)  RR: 20 (10 Oct 2019 10:00) (17 - 30)  SpO2: 97% (10 Oct 2019 10:00) (84% - 99%)    I&O's Detail    09 Oct 2019 07:01  -  10 Oct 2019 07:00  --------------------------------------------------------  IN:    IV PiggyBack: 200 mL    Oral Fluid: 820 mL  Total IN: 1020 mL    OUT:    Voided: 1725 mL  Total OUT: 1725 mL    Net: I&O's Detail    08 Oct 2019 07:01  -  09 Oct 2019 07:00  --------------------------------------------------------  Total NET: 900 mL    09 Oct 2019 07:01  -  10 Oct 2019 07:00  --------------------------------------------------------  Total NET: -705 mL      CAPILLARY BLOOD GLUCOSE  POCT Blood Glucose.: 103 mg/dL (10 Oct 2019 06:41)  POCT Blood Glucose.: 111 mg/dL (10 Oct 2019 01:36)  POCT Blood Glucose.: 89 mg/dL (09 Oct 2019 21:49)  POCT Blood Glucose.: 81 mg/dL (09 Oct 2019 16:57)  POCT Blood Glucose.: 127 mg/dL (09 Oct 2019 11:29)      Physical Exam:  General: NAD; A&Ox3  Cardiac: S1/S2, RRR, no murmur, no rubs  Lungs: unlabored respirations, decreased at left base, no wheeze, no rales, no crackles  Abdomen: Soft/NT/ND; positive bowel sounds x 4  Sternum: Intact, no click, incision healing well with no drainage  Incisions: Incisions clean/dry/intact  Extremities: No edema b/l lower extremities; good capillary refill; no cyanosis; palpable 1+ pedal pulses b/l    Central Venous Catheter: Yes[]  No[x] , If Yes indication:                    Day #  EPICARDIAL WIRES:  [] YES [x] NO                                                            Day #  BOWEL MOVEMENT:  [x] YES [] NO, If No, Timing since last BM Day #        LABS:                        11.0<L>  10.26 )-----------( 270      ( 10 Oct 2019 02:00 )             33.1<L>                        10.1<L>  9.53  )-----------( 227      ( 09 Oct 2019 01:15 )             30.7<L>    10-10    136  |  98  |  22<H>  ----------------------------<  112<H>  5.0   |  24  |  0.9  10-09    133<L>  |  99  |  30<H>  ----------------------------<  143<H>  5.4<H>   |  24  |  1.0    Ca    10.4<H>      10 Oct 2019 02:00  Mg     2.2     10-10    TPro  6.3 [6.0 - 8.0]  /  Alb  4.0 [3.5 - 5.2]  /  TBili  1.3<H> [0.2 - 1.2]  /  DBili  x   /  AST  13 [0 - 41]  /  ALT  9 [0 - 41]  /  AlkPhos  100 [30 - 115]  10-10    PT/INR - ( 10 Oct 2019 04:50 )   PT: ;   INR: 1.75 ratio       PT/INR - ( 09 Oct 2019 09:00 )   PT: ;   INR: 1.50 ratio        RADIOLOGY & ADDITIONAL TESTS:  CXR: < from: Xray Chest 2 Views PA/Lat (10.10.19 @ 09:45) >  Impression:  Unchanged bibasilar opacities/pleural effusions.  < end of copied text >    EKG: < from: 12 Lead ECG (10.10.19 @ 08:16) >  Ventricular Rate 75 BPM  Atrial Rate 75 BPM  P-R Interval 96 ms  QRS Duration 72 ms  Q-T Interval 320 ms  QTC Calculation(Bezet) 357 ms  P Axis -22 degrees  R Axis -4 degrees  T Axis 224 degrees  Diagnosis Line Electronic atrial pacemaker  ST & T wave abnormality, consider lateral ischemia  Abnormal ECG  Confirmed by Sharif Schwartz ([id number]) on 10/10/2019 8:53:22 AM  < end of copied text >      Allergies  No Known Drug Allergies  TEGADERM (Rash)  Intolerances      MEDICATIONS  (STANDING):  aspirin enteric coated 81 milliGRAM(s) Oral daily  atorvastatin 10 milliGRAM(s) Oral at bedtime  BACItracin   Ointment 1 Application(s) Topical two times a day  dextrose 5%. 1000 milliLiter(s) (50 mL/Hr) IV Continuous <Continuous>  dextrose 50% Injectable 50 milliLiter(s) IV Push every 15 minutes  dextrose 50% Injectable 25 milliLiter(s) IV Push every 15 minutes  diltiazem    milliGRAM(s) Oral daily  docusate sodium 100 milliGRAM(s) Oral three times a day  famotidine    Tablet 20 milliGRAM(s) Oral two times a day  fluticasone propionate 50 MICROgram(s)/spray Nasal Spray 1 Spray(s) Both Nostrils two times a day  furosemide    Tablet 40 milliGRAM(s) Oral daily  heparin  Injectable 5000 Unit(s) SubCutaneous every 8 hours  insulin glargine Injectable (LANTUS) 20 Unit(s) SubCutaneous every morning  insulin lispro (HumaLOG) corrective regimen sliding scale   SubCutaneous three times a day before meals  insulin lispro Injectable (HumaLOG) 4 Unit(s) SubCutaneous before breakfast  ipratropium    for Nebulization 500 MICROGram(s) Nebulizer every 6 hours  levothyroxine 112 MICROGram(s) Oral daily  magnesium sulfate  IVPB 1 Gram(s) IV Intermittent every 12 hours  metoprolol tartrate 25 milliGRAM(s) Oral two times a day  polyethylene glycol 3350 17 Gram(s) Oral daily  senna 1 Tablet(s) Oral two times a day  tacrolimus 0.5 milliGRAM(s) Oral every 12 hours  warfarin 1 milliGRAM(s) Oral once    MEDICATIONS  (PRN):  dextrose 40% Gel 15 Gram(s) Oral once PRN Blood Glucose LESS THAN 70 milliGRAM(s)/deciliter  glucagon  Injectable 1 milliGRAM(s) IntraMuscular once PRN Glucose LESS THAN 70 milligrams/deciliter      Post-Op Beta-Blockers: [x]Yes, []No: contraindication:  Post-Op Aspirin: [x]Yes,  []No: contraindication:  Post-Op Statin: [x]Yes, []No: contraindication:      Ambulation/Activity Status: OOB/AMB    Assessment/Plan:  79y Female status-post CABG x 5            POD #   8     - Case and plan discussed with CTU Intensivist and CT Surgeon - Dr. Mirza  - Continue CTU supportive care and ongoing plan of care as per continuing CTU rounds.    - Continue DVT/GI prophylaxis  - Incentive Spirometry 10 times an hour  - Continue to advance physical activity as tolerated and continue PT/OT as directed  1. CAD: Continue ASA, statin, BB  2. A. Fib: NSR, cont warfarin 1 milliGRAM(s) for anticoagulation.  3. COPD/Hypoxia: cont duoneb  4. DM/Glucose Control: cont insulin glargine Injectable (LANTUS) 20 Unit(s) SubCutaneous every morning/insulin lispro (HumaLOG) corrective regimen sliding scale   SubCutaneous three times a day before meals/insulin lispro Injectable (HumaLOG) 4 Unit(s) SubCutaneous before breakfast. Restart home dosing on discharge.  5. Continue tacrolimus 0.5 milliGRAM(s) Oral every 12 hours for hx of liver transplant. F/u Tacrolimus level in am and adjust according to renal recommendation. T.Bili slightly elevated, f/u am LFTs.    Social Service Disposition: Home vs SNF.

## 2019-10-10 NOTE — PROGRESS NOTE ADULT - SUBJECTIVE AND OBJECTIVE BOX
CTU Attending Progress Daily Note     10 Oct 2019 10:57  POD# - 8  He has history of Myocardial infarction  HTN (hypertension)  SOB (shortness of breath)  Hypothyroid  Liver transplant status  Diabetes    Interval event for past 24 hr:  NEPTALI ANNA  79y had no event.   Current Complains:  NEPTALI ANNA has no new complains  HPI:    OBJECTIVE:  ICU Vital Signs Last 24 Hrs  T(C): 35.8 (10 Oct 2019 08:00), Max: 36.1 (09 Oct 2019 20:00)  T(F): 96.5 (10 Oct 2019 08:00), Max: 97 (09 Oct 2019 20:00)  HR: 65 (10 Oct 2019 10:00) (60 - 88)  BP: 120/58 (10 Oct 2019 10:00) (120/58 - 193/82)  BP(mean): 84 (10 Oct 2019 10:00) (84 - 119)  ABP: --  ABP(mean): --  RR: 20 (10 Oct 2019 10:00) (17 - 30)  SpO2: 97% (10 Oct 2019 10:00) (84% - 99%)    I&O's Summary    09 Oct 2019 07:01  -  10 Oct 2019 07:00  --------------------------------------------------------  IN: 1020 mL / OUT: 1725 mL / NET: -705 mL    10 Oct 2019 07:01  -  10 Oct 2019 10:57  --------------------------------------------------------  IN: 120 mL / OUT: 380 mL / NET: -260 mL      I&O's Detail    09 Oct 2019 07:01  -  10 Oct 2019 07:00  --------------------------------------------------------  IN:    IV PiggyBack: 200 mL    Oral Fluid: 820 mL  Total IN: 1020 mL    OUT:    Voided: 1725 mL  Total OUT: 1725 mL    Total NET: -705 mL      10 Oct 2019 07:01  -  10 Oct 2019 10:57  --------------------------------------------------------  IN:    Oral Fluid: 120 mL  Total IN: 120 mL    OUT:    Voided: 380 mL  Total OUT: 380 mL    Total NET: -260 mL        Adult Advanced Hemodynamics Last 24 Hrs  CVP(mm Hg): --  CVP(cm H2O): --  CO: --  CI: --  PA: --  PA(mean): --  PCWP: --  SVR: --  SVRI: --  PVR: --  PVRI: --    CAPILLARY BLOOD GLUCOSE      POCT Blood Glucose.: 103 mg/dL (10 Oct 2019 06:41)  POCT Blood Glucose.: 111 mg/dL (10 Oct 2019 01:36)  POCT Blood Glucose.: 89 mg/dL (09 Oct 2019 21:49)  POCT Blood Glucose.: 81 mg/dL (09 Oct 2019 16:57)  POCT Blood Glucose.: 127 mg/dL (09 Oct 2019 11:29)    LABS:                          11.0   10.26 )-----------( 270      ( 10 Oct 2019 02:00 )             33.1     10-10    136  |  98  |  22<H>  ----------------------------<  112<H>  5.0   |  24  |  0.9    Ca    10.4<H>      10 Oct 2019 02:00  Mg     2.2     10-10    TPro  6.3  /  Alb  4.0  /  TBili  1.3<H>  /  DBili  x   /  AST  13  /  ALT  9   /  AlkPhos  100  10-10    PT/INR - ( 10 Oct 2019 04:50 )   PT: 20.00 sec;   INR: 1.75 ratio               Home Medications:  Ambien 5 mg oral tablet: 1 tab(s) orally once a day (at bedtime) (24 Sep 2019 10:23)  aspirin 81 mg oral tablet: 1 tab(s) orally once a day (24 Sep 2019 10:23)  insulin aspart: 4 UNITS subcutaneous 3 times a day (before meals) BASED ON BLOOD SUGAR (24 Sep 2019 10:23)  Lantus: 18 unit(s) subcutaneous once a day (at bedtime) (24 Sep 2019 10:23)  Lopressor 50 mg oral tablet: 1 tab(s) orally 2 times a day (24 Sep 2019 10:23)  NIFEdipine 90 mg oral tablet, extended release: 1 tab(s) orally once a day (24 Sep 2019 10:23)  Prograf 1 mg oral capsule: 1 cap(s) orally every 12 hours (24 Sep 2019 10:23)  Synthroid 112 mcg (0.112 mg) oral tablet: 1 tab(s) orally once a day (24 Sep 2019 10:23)    HOSPITAL MEDICATIONS:  MEDICATIONS  (STANDING):  aspirin enteric coated 81 milliGRAM(s) Oral daily  atorvastatin 10 milliGRAM(s) Oral at bedtime  BACItracin   Ointment 1 Application(s) Topical two times a day  dextrose 5%. 1000 milliLiter(s) (50 mL/Hr) IV Continuous <Continuous>  dextrose 50% Injectable 50 milliLiter(s) IV Push every 15 minutes  dextrose 50% Injectable 25 milliLiter(s) IV Push every 15 minutes  diltiazem    milliGRAM(s) Oral daily  docusate sodium 100 milliGRAM(s) Oral three times a day  famotidine    Tablet 20 milliGRAM(s) Oral two times a day  fluticasone propionate 50 MICROgram(s)/spray Nasal Spray 1 Spray(s) Both Nostrils two times a day  furosemide    Tablet 40 milliGRAM(s) Oral daily  heparin  Injectable 5000 Unit(s) SubCutaneous every 8 hours  insulin glargine Injectable (LANTUS) 20 Unit(s) SubCutaneous every morning  insulin lispro (HumaLOG) corrective regimen sliding scale   SubCutaneous three times a day before meals  insulin lispro Injectable (HumaLOG) 4 Unit(s) SubCutaneous before breakfast  ipratropium    for Nebulization 500 MICROGram(s) Nebulizer every 6 hours  levothyroxine 112 MICROGram(s) Oral daily  magnesium sulfate  IVPB 1 Gram(s) IV Intermittent every 12 hours  metoprolol tartrate 25 milliGRAM(s) Oral two times a day  polyethylene glycol 3350 17 Gram(s) Oral daily  senna 1 Tablet(s) Oral two times a day  tacrolimus 0.5 milliGRAM(s) Oral every 12 hours  warfarin 1 milliGRAM(s) Oral once    MEDICATIONS  (PRN):  dextrose 40% Gel 15 Gram(s) Oral once PRN Blood Glucose LESS THAN 70 milliGRAM(s)/deciliter  glucagon  Injectable 1 milliGRAM(s) IntraMuscular once PRN Glucose LESS THAN 70 milligrams/deciliter      REVIEW OF SYSTEMS:  CONSTITUTIONAL: [X] all negative; [ ] weakness, [ ] fevers, [ ] chills  EYES/ENT: [X] all negative; [ ] visual changes, [ ] vertigo, [ ] throat pain   NECK: [X] all negative; [ ] pain, [ ] stiffness  RESPIRATORY: [] all negative, [ ] cough, [ ] wheezing, [ ] hemoptysis, [ ] shortness of breath  CARDIOVASCULAR: [] all negative; [ ] chest pain, [ ] palpitations, [ ] orthopnea  GASTROINTESTINAL: [X] all negative; [ ]abdominal pain, [ ] nausea, [ ] vomiting, [ ] hematemesis, [ ] diarrhea, [ ] constipation, [ ] melena, [ ] hematochezia.  GENITOURINARY: [X] all negative; [ ] dysuria, [ ] frequency, [ ] hematuria  NEUROLOGICAL: [X] all negative; [ ] numbness, [ ] weakness  SKIN: [X] all negative; [ ] itching, [ ] burning, [ ] rashes, [ ] lesions   All other review of systems is negative unless indicated above.    [  ] Unable to assess ROS because     PHYSICAL EXAM:          CONSTITUTIONAL: Well-developed; well-nourished; in no acute distress.   	SKIN: warm, dry  	HEAD: Normocephalic; atraumatic.  	EYES: PERRL, EOM, no conj injection, sclera clear  	ENT: No nasal discharge; airway clear.  	NECK: Supple; non tender.  No midline ttp ctls  	CARD: S1, S2 normal; no murmurs, gallops, or rubs. Regular rate and rhythm. 2+ RPs and DPs bilat, no carotid bruits, no pedal   edema, no calf pain b/l  	RESP: CTA  bilat good air movement No wheezes, rales or rhonchi.  	ABD: Soft, not tender, not distended, no CVA ttp no rebound or guarding, bowel sounds present  	EXT: Normal ROM.  No clubbing, cyanosis or edema.   	  	NEURO: Alert, awake, motor 5/5 R, 5/5 L        RADIOLOGY:  xray

## 2019-10-10 NOTE — CONSULT NOTE ADULT - CONSULT REASON
3vCAD
AUDRA
Coronary artery disease s/p CABG x5
preoperative risk stratification for CABG
cardiac debilitation

## 2019-10-10 NOTE — CONSULT NOTE ADULT - ASSESSMENT
IMPRESSION: Rehab of cardiac debilitation    PRECAUTIONS: [x  ] Cardiac  [  ] Respiratory  [  ] Seizures [  ] Contact Isolation  [  ] Droplet Isolation  [  ] Other    Weight Bearing Status:     RECOMMENDATION:    Out of Bed to Chair     DVT/Decubiti Prophylaxis    REHAB PLAN:     [ x  ] Bedside P/T 3-5 times a week   [   ]   Bedside O/T  2-3 times a week             [   ] No Rehab Therapy Indicated                   [   ]  Speech Therapy   Conditioning/ROM                                    ADL  Bed Mobility                                               Conditioning/ROM  Transfers                                                     Bed Mobility  Sitting /Standing Balance                         Transfers                                        Gait Training                                               Sitting/Standing Balance  Stair Training [   ]Applicable                    Home equipment Eval                                                                        Splinting  [   ] Only      GOALS:   ADL   [   ]   Independent                    Transfers  [  x ] Independent                          Ambulation  [  x ] Independent     [ x   ] With device                            [   ]  CG                                                         [   ]  CG                                                                  [   ] CG                            [    ] Min A                                                   [   ] Min A                                                              [   ] Min  A          DISCHARGE PLAN:   [   ]  Good candidate for Intensive Rehabilitation/Hospital based                                             Will tolerate 3hrs Intensive Rehab Daily                                       [  x  ]  Short Term Rehab in Skilled Nursing Facility                        vs               [ x   ]  Home with Outpatient or VN services                                         [    ]  Possible Candidate for Intensive Hospital based Rehab

## 2019-10-10 NOTE — CONSULT NOTE ADULT - SUBJECTIVE AND OBJECTIVE BOX
HPI: 78 yo F admitted on 10/1 for cardiac cath which showed severe cad, s/p cabg on 10/2. Prior to hospitalization she was ambulating independent. Rehab consulted post-op for eval      PAST MEDICAL & SURGICAL HISTORY:  Myocardial infarction  HTN (hypertension)  SOB (shortness of breath)  Hypothyroid  Liver transplant status  Diabetes  H/O heart artery stent: X5  Pacemaker: medtronic- last interogated 1 m ago  Liver transplanted      Hospital Course:    TODAY'S SUBJECTIVE & REVIEW OF SYMPTOMS:     Constitutional WNL   Cardio WNL   Resp WNL   GI WNL  Heme WNL  Endo WNL  Skin WNL  MSK Weakness  Neuro WNL  Cognitive WNL  Psych WNL      MEDICATIONS  (STANDING):  aspirin enteric coated 81 milliGRAM(s) Oral daily  atorvastatin 10 milliGRAM(s) Oral at bedtime  BACItracin   Ointment 1 Application(s) Topical two times a day  chlorhexidine 4% Liquid 1 Application(s) Topical <User Schedule>  dextrose 5%. 1000 milliLiter(s) (50 mL/Hr) IV Continuous <Continuous>  dextrose 50% Injectable 50 milliLiter(s) IV Push every 15 minutes  dextrose 50% Injectable 25 milliLiter(s) IV Push every 15 minutes  diltiazem    milliGRAM(s) Oral daily  docusate sodium 100 milliGRAM(s) Oral three times a day  famotidine    Tablet 20 milliGRAM(s) Oral two times a day  fluticasone propionate 50 MICROgram(s)/spray Nasal Spray 1 Spray(s) Both Nostrils two times a day  furosemide    Tablet 40 milliGRAM(s) Oral daily  heparin  Injectable 5000 Unit(s) SubCutaneous every 8 hours  insulin glargine Injectable (LANTUS) 20 Unit(s) SubCutaneous every morning  insulin lispro (HumaLOG) corrective regimen sliding scale   SubCutaneous three times a day before meals  insulin lispro Injectable (HumaLOG) 4 Unit(s) SubCutaneous before breakfast  ipratropium    for Nebulization 500 MICROGram(s) Nebulizer every 6 hours  levothyroxine 112 MICROGram(s) Oral daily  magnesium sulfate  IVPB 1 Gram(s) IV Intermittent every 12 hours  metoprolol tartrate 25 milliGRAM(s) Oral two times a day  polyethylene glycol 3350 17 Gram(s) Oral daily  senna 1 Tablet(s) Oral two times a day  tacrolimus 0.5 milliGRAM(s) Oral every 12 hours  trimethoprim  160 mG/sulfamethoxazole 800 mG 1 Tablet(s) Oral every 12 hours  warfarin 1 milliGRAM(s) Oral once    MEDICATIONS  (PRN):  dextrose 40% Gel 15 Gram(s) Oral once PRN Blood Glucose LESS THAN 70 milliGRAM(s)/deciliter  glucagon  Injectable 1 milliGRAM(s) IntraMuscular once PRN Glucose LESS THAN 70 milligrams/deciliter      FAMILY HISTORY:  Family history of early CAD  FH: cancer      Allergies    No Known Drug Allergies  TEGADERM (Rash)    Intolerances        SOCIAL HISTORY:    [  ] Etoh  [  ] Smoking  [  ] Substance abuse     Home Environment:  [x  ] Home Alone  [  ] Lives with Family  [  ] Home Health Aid    Dwelling:  [  ] Apartment  [ x ] Private House  [  ] Adult Home  [  ] Skilled Nursing Facility      [  ] Short Term  [  ] Long Term  [ x ] Stairs       Elevator [  ]    FUNCTIONAL STATUS PTA: (Check all that apply)  Ambulation: [x   ]Independent    [  ] Dependent     [  ] Non-Ambulatory  Assistive Device: [  ] SA Cane  [  ]  Q Cane  [  ] Walker  [  ]  Wheelchair  ADL : [ x ] Independent  [  ]  Dependent       Vital Signs Last 24 Hrs  T(C): 35.9 (10 Oct 2019 12:00), Max: 36.1 (09 Oct 2019 20:00)  T(F): 96.7 (10 Oct 2019 12:00), Max: 97 (09 Oct 2019 20:00)  HR: 60 (10 Oct 2019 14:00) (60 - 72)  BP: 124/60 (10 Oct 2019 14:00) (120/58 - 181/83)  BP(mean): 86 (10 Oct 2019 14:00) (84 - 119)  RR: 19 (10 Oct 2019 14:00) (17 - 30)  SpO2: 95% (10 Oct 2019 14:00) (95% - 99%)      PHYSICAL EXAM: Alert & Oriented X3  GENERAL: NAD, well-groomed, well-developed  HEAD:  Atraumatic, Normocephalic  CHEST/LUNG: Clear   HEART: S1S2+  ABDOMEN: Soft, Nontender  EXTREMITIES:  no calf tenderness    NERVOUS SYSTEM:  Cranial Nerves 2-12 intact [  ] Abnormal  [  ]  ROM: WFL all extremities [x  ]  Abnormal [  ]  Motor Strength: WFL all extremities  [x  ]  Abnormal [  ]  Sensation: intact to light touch [ xx ] Abnormal [  ]  Reflexes: Symmetric [  ]  Abnormal [  ]    FUNCTIONAL STATUS:  Bed Mobility: Independent [  ]  Supervision [  ]  Needs Assistance [x  ]  N/A [  ]  Transfers: Independent [  ]  Supervision [  ]  Needs Assistance [x  ]  N/A [  ]   Ambulation: Independent [  ]  Supervision [  ]  Needs Assistance [  ]  N/A [  ]  ADL: Independent [  ] Requires Assistance [  ] N/A [  ]      LABS:                        11.0   10.26 )-----------( 270      ( 10 Oct 2019 02:00 )             33.1     10-10    136  |  98  |  22<H>  ----------------------------<  112<H>  5.0   |  24  |  0.9    Ca    10.4<H>      10 Oct 2019 02:00  Mg     2.2     10-10    TPro  6.3  /  Alb  4.0  /  TBili  1.3<H>  /  DBili  x   /  AST  13  /  ALT  9   /  AlkPhos  100  10-10    PT/INR - ( 10 Oct 2019 04:50 )   PT: 20.00 sec;   INR: 1.75 ratio           Urinalysis Basic - ( 10 Oct 2019 11:19 )    Color: Yellow / Appearance: Turbid / S.013 / pH: x  Gluc: x / Ketone: Trace  / Bili: Negative / Urobili: <2 mg/dL   Blood: x / Protein: 100 mg/dL / Nitrite: Negative   Leuk Esterase: Large / RBC: 104 /HPF / WBC >720 /HPF   Sq Epi: x / Non Sq Epi: 7 /HPF / Bacteria: Many        RADIOLOGY & ADDITIONAL STUDIES:    Assesment:

## 2019-10-10 NOTE — PROGRESS NOTE ADULT - ASSESSMENT
Assessment/Plan:  CAD-s/p CABG x 5-POD #8  1-BP control- beta-blockers and cardiazm 30 q6  will change to Cradiazm  dailly  2-serum glucose control- lantus and novlog  3-acute blood loss anemia- f/u cbc daily  8-jgpznvxsygevtmpb-jtqtnd, continue to monitor plts daily  5-hx liver transplant- Tacrolimus  0.5 q12  check level in am  0-rnppsabmhnz-tumvblrf synthroid  7-  A fib on  cardiazm  CD po and coumadin 1 mg tonight   pt refusing cardioversing     - pulmonary congestion lasix 40 mg po     Transfer to 3 c

## 2019-10-11 LAB
ALBUMIN SERPL ELPH-MCNC: 3.7 G/DL — SIGNIFICANT CHANGE UP (ref 3.5–5.2)
ALP SERPL-CCNC: 90 U/L — SIGNIFICANT CHANGE UP (ref 30–115)
ALT FLD-CCNC: 9 U/L — SIGNIFICANT CHANGE UP (ref 0–41)
ANION GAP SERPL CALC-SCNC: 11 MMOL/L — SIGNIFICANT CHANGE UP (ref 7–14)
AST SERPL-CCNC: 13 U/L — SIGNIFICANT CHANGE UP (ref 0–41)
BILIRUB DIRECT SERPL-MCNC: 0.3 MG/DL — HIGH (ref 0–0.2)
BILIRUB INDIRECT FLD-MCNC: 0.8 MG/DL — SIGNIFICANT CHANGE UP (ref 0.2–1.2)
BILIRUB SERPL-MCNC: 1.1 MG/DL — SIGNIFICANT CHANGE UP (ref 0.2–1.2)
BUN SERPL-MCNC: 21 MG/DL — HIGH (ref 10–20)
CALCIUM SERPL-MCNC: 10.1 MG/DL — SIGNIFICANT CHANGE UP (ref 8.5–10.1)
CHLORIDE SERPL-SCNC: 98 MMOL/L — SIGNIFICANT CHANGE UP (ref 98–110)
CO2 SERPL-SCNC: 26 MMOL/L — SIGNIFICANT CHANGE UP (ref 17–32)
CREAT SERPL-MCNC: 1.1 MG/DL — SIGNIFICANT CHANGE UP (ref 0.7–1.5)
GLUCOSE BLDC GLUCOMTR-MCNC: 126 MG/DL — HIGH (ref 70–99)
GLUCOSE BLDC GLUCOMTR-MCNC: 130 MG/DL — HIGH (ref 70–99)
GLUCOSE BLDC GLUCOMTR-MCNC: 179 MG/DL — HIGH (ref 70–99)
GLUCOSE BLDC GLUCOMTR-MCNC: 195 MG/DL — HIGH (ref 70–99)
GLUCOSE SERPL-MCNC: 122 MG/DL — HIGH (ref 70–99)
HCT VFR BLD CALC: 31.4 % — LOW (ref 37–47)
HGB BLD-MCNC: 10.2 G/DL — LOW (ref 12–16)
INR BLD: 2.02 RATIO — HIGH (ref 0.65–1.3)
MAGNESIUM SERPL-MCNC: 2.3 MG/DL — SIGNIFICANT CHANGE UP (ref 1.8–2.4)
MCHC RBC-ENTMCNC: 29 PG — SIGNIFICANT CHANGE UP (ref 27–31)
MCHC RBC-ENTMCNC: 32.5 G/DL — SIGNIFICANT CHANGE UP (ref 32–37)
MCV RBC AUTO: 89.2 FL — SIGNIFICANT CHANGE UP (ref 81–99)
NRBC # BLD: 0 /100 WBCS — SIGNIFICANT CHANGE UP (ref 0–0)
PLATELET # BLD AUTO: 272 K/UL — SIGNIFICANT CHANGE UP (ref 130–400)
POTASSIUM SERPL-MCNC: 5.1 MMOL/L — HIGH (ref 3.5–5)
POTASSIUM SERPL-SCNC: 5.1 MMOL/L — HIGH (ref 3.5–5)
PROT SERPL-MCNC: 5.9 G/DL — LOW (ref 6–8)
PROTHROM AB SERPL-ACNC: 23.1 SEC — HIGH (ref 9.95–12.87)
RBC # BLD: 3.52 M/UL — LOW (ref 4.2–5.4)
RBC # FLD: 14.7 % — HIGH (ref 11.5–14.5)
SODIUM SERPL-SCNC: 135 MMOL/L — SIGNIFICANT CHANGE UP (ref 135–146)
WBC # BLD: 10.37 K/UL — SIGNIFICANT CHANGE UP (ref 4.8–10.8)
WBC # FLD AUTO: 10.37 K/UL — SIGNIFICANT CHANGE UP (ref 4.8–10.8)

## 2019-10-11 PROCEDURE — 93010 ELECTROCARDIOGRAM REPORT: CPT

## 2019-10-11 PROCEDURE — 71045 X-RAY EXAM CHEST 1 VIEW: CPT | Mod: 26

## 2019-10-11 RX ORDER — DILTIAZEM HCL 120 MG
180 CAPSULE, EXT RELEASE 24 HR ORAL AT BEDTIME
Refills: 0 | Status: DISCONTINUED | OUTPATIENT
Start: 2019-10-11 | End: 2019-10-18

## 2019-10-11 RX ORDER — WARFARIN SODIUM 2.5 MG/1
1 TABLET ORAL ONCE
Refills: 0 | Status: COMPLETED | OUTPATIENT
Start: 2019-10-11 | End: 2019-10-11

## 2019-10-11 RX ADMIN — Medication 1 SPRAY(S): at 06:29

## 2019-10-11 RX ADMIN — Medication 25 MILLIGRAM(S): at 17:53

## 2019-10-11 RX ADMIN — Medication 1 APPLICATION(S): at 06:28

## 2019-10-11 RX ADMIN — ZOLPIDEM TARTRATE 5 MILLIGRAM(S): 10 TABLET ORAL at 23:27

## 2019-10-11 RX ADMIN — INSULIN GLARGINE 20 UNIT(S): 100 INJECTION, SOLUTION SUBCUTANEOUS at 07:45

## 2019-10-11 RX ADMIN — Medication 500 MICROGRAM(S): at 19:41

## 2019-10-11 RX ADMIN — Medication 112 MICROGRAM(S): at 06:29

## 2019-10-11 RX ADMIN — TACROLIMUS 0.5 MILLIGRAM(S): 5 CAPSULE ORAL at 06:29

## 2019-10-11 RX ADMIN — FAMOTIDINE 20 MILLIGRAM(S): 10 INJECTION INTRAVENOUS at 06:29

## 2019-10-11 RX ADMIN — HEPARIN SODIUM 5000 UNIT(S): 5000 INJECTION INTRAVENOUS; SUBCUTANEOUS at 06:29

## 2019-10-11 RX ADMIN — Medication 1 APPLICATION(S): at 17:51

## 2019-10-11 RX ADMIN — Medication 500 MICROGRAM(S): at 07:25

## 2019-10-11 RX ADMIN — FAMOTIDINE 20 MILLIGRAM(S): 10 INJECTION INTRAVENOUS at 17:52

## 2019-10-11 RX ADMIN — Medication 1 TABLET(S): at 06:28

## 2019-10-11 RX ADMIN — Medication 81 MILLIGRAM(S): at 12:05

## 2019-10-11 RX ADMIN — Medication 180 MILLIGRAM(S): at 21:40

## 2019-10-11 RX ADMIN — HEPARIN SODIUM 5000 UNIT(S): 5000 INJECTION INTRAVENOUS; SUBCUTANEOUS at 21:40

## 2019-10-11 RX ADMIN — Medication 180 MILLIGRAM(S): at 02:59

## 2019-10-11 RX ADMIN — Medication 1: at 12:05

## 2019-10-11 RX ADMIN — Medication 100 MILLIGRAM(S): at 21:40

## 2019-10-11 RX ADMIN — HEPARIN SODIUM 5000 UNIT(S): 5000 INJECTION INTRAVENOUS; SUBCUTANEOUS at 14:18

## 2019-10-11 RX ADMIN — Medication 100 GRAM(S): at 17:53

## 2019-10-11 RX ADMIN — Medication 25 MILLIGRAM(S): at 06:29

## 2019-10-11 RX ADMIN — TACROLIMUS 0.5 MILLIGRAM(S): 5 CAPSULE ORAL at 17:53

## 2019-10-11 RX ADMIN — ATORVASTATIN CALCIUM 10 MILLIGRAM(S): 80 TABLET, FILM COATED ORAL at 21:40

## 2019-10-11 RX ADMIN — Medication 1 SPRAY(S): at 17:52

## 2019-10-11 RX ADMIN — Medication 4 UNIT(S): at 07:44

## 2019-10-11 RX ADMIN — SENNA PLUS 1 TABLET(S): 8.6 TABLET ORAL at 06:29

## 2019-10-11 RX ADMIN — Medication 100 MILLIGRAM(S): at 06:29

## 2019-10-11 RX ADMIN — Medication 40 MILLIGRAM(S): at 06:29

## 2019-10-11 RX ADMIN — Medication 1 TABLET(S): at 17:54

## 2019-10-11 RX ADMIN — WARFARIN SODIUM 1 MILLIGRAM(S): 2.5 TABLET ORAL at 21:40

## 2019-10-11 NOTE — CHART NOTE - NSCHARTNOTEFT_GEN_A_CORE
Informed by RN & patient she had an assisted fall to floor. Rn with patient in bathroom who was unable to make toilet and lower to floor. Patient ambulated back to bed with walker and RN after moving bowels without complaints. No further intervention required at this time. will make attending aware.

## 2019-10-11 NOTE — PROVIDER CONTACT NOTE (FALL NOTIFICATION) - SITUATION
Patient ambulated to bathroom with walker and RN, unable to make transfer to toilet bowel and assisted to floor by RN without any type of trauma. Two person assist to bowel, patient moved bowels and

## 2019-10-11 NOTE — PROGRESS NOTE ADULT - SUBJECTIVE AND OBJECTIVE BOX
OPERATIVE PROCEDURE(s):                POD #    9  s/p CABG                   79yFemale  SURGEON(s): JUAREZ Mirza  SUBJECTIVE ASSESSMENT: doing well, ready for discharge, patient / family ? no happy with SNF sites  Vital Signs Last 24 Hrs  T(F): 96.9 (11 Oct 2019 05:00), Max: 96.9 (11 Oct 2019 05:00)  HR: 73 (11 Oct 2019 05:00) (60 - 80)  BP: 179/73 (11 Oct 2019 05:00) (120/58 - 189/77)  BP(mean): 101 (10 Oct 2019 20:00) (84 - 110)  RR: 18 (11 Oct 2019 05:00) (18 - 25)  SpO2: 96% (10 Oct 2019 22:01) (94% - 98%)    Net:   I&O's Detail    09 Oct 2019 07:01  -  10 Oct 2019 07:00  --------------------------------------------------------  Total NET: -705 mL      10 Oct 2019 07:01  -  11 Oct 2019 07:00  --------------------------------------------------------  Total NET: -620 mL        CAPILLARY BLOOD GLUCOSE      POCT Blood Glucose.: 126 mg/dL (11 Oct 2019 07:35)  POCT Blood Glucose.: 129 mg/dL (10 Oct 2019 22:02)  POCT Blood Glucose.: 90 mg/dL (10 Oct 2019 16:08)  POCT Blood Glucose.: 170 mg/dL (10 Oct 2019 11:10)    Physical Exam:  General: NAD; A&Ox3  Cardiac: S1/S2, RRR, no murmur, no rubs  Lungs: unlabored respirations, CTA b/l, no wheeze, no rales, no crackles  Abdomen: Soft/NT/ND; positive bowel sounds x 4  Sternum: Intact, no click, incision healing well with no drainage  Incisions: Incisions clean/dry/intact  Extremities: No edema b/l lower extremities; good capillary refill; no cyanosis; palpable 1+ pedal pulses b/l    Temple Catheter: Yes  [] , No  [] , If yes indication:                      Day #  EPICARDIAL WIRES:  [] YES [] NO                                              Day #  BOWEL MOVEMENT:  [] YES [] NO, If No, Timing since last BM:      Day #      LABS:                        10.2<L>  10.37 )-----------( 272      ( 11 Oct 2019 01:02 )             31.4<L>                        11.0<L>  10.26 )-----------( 270      ( 10 Oct 2019 02:00 )             33.1<L>    1011    135  |  98  |  21<H>  ----------------------------<  122<H>  5.1<H>   |  26  |  1.1  10-10    136  |  98  |  22<H>  ----------------------------<  112<H>  5.0   |  24  |  0.9    Ca    10.1      11 Oct 2019 01:02  Mg     2.3     10-11    TPro  5.9<L> [6.0 - 8.0]  /  Alb  3.7 [3.5 - 5.2]  /  TBili  1.1 [0.2 - 1.2]  /  DBili  0.3<H> [0.0 - 0.2]  /  AST  13 [0 - 41]  /  ALT  9 [0 - 41]  /  AlkPhos  90 [30 - 115]  10-11    PT/INR - ( 11 Oct 2019 01:02 )   PT: ;   INR: 2.02 ratio         PT/INR - ( 10 Oct 2019 04:50 )   PT: ;   INR: 1.75 ratio           Urinalysis Basic - ( 10 Oct 2019 11:19 )    Color: Yellow / Appearance: Turbid / S.013 / pH: x  Gluc: x / Ketone: Trace  / Bili: Negative / Urobili: <2 mg/dL   Blood: x / Protein: 100 mg/dL / Nitrite: Negative   Leuk Esterase: Large / RBC: 104 /HPF / WBC >720 /HPF   Sq Epi: x / Non Sq Epi: 7 /HPF / Bacteria: Many    RADIOLOGY & ADDITIONAL TESTS:  CXR: < from: Xray Chest 2 Views PA/Lat (10.10.19 @ 09:45) >  Impression:      Unchanged bibasilar opacities/pleural effusions.    EKG: < from: 12 Lead ECG (10.10.19 @ 08:16) >  Ventricular Rate 75 BPM    Atrial Rate 75 BPM    P-R Interval 96 ms    QRS Duration 72 ms    Q-T Interval 320 ms    QTC Calculation(Bezet) 357 ms    P Axis -22 degrees    R Axis -4 degrees    T Axis 224 degrees    Diagnosis Line Electronic atrial pacemaker  ST & T wave abnormality, consider lateral ischemia  Abnormal ECG    < end of copied text >    MEDICATIONS  (STANDING):  aspirin enteric coated 81 milliGRAM(s) Oral daily  atorvastatin 10 milliGRAM(s) Oral at bedtime  BACItracin   Ointment 1 Application(s) Topical two times a day  chlorhexidine 4% Liquid 1 Application(s) Topical <User Schedule>  dextrose 5%. 1000 milliLiter(s) (50 mL/Hr) IV Continuous <Continuous>  dextrose 50% Injectable 50 milliLiter(s) IV Push every 15 minutes  dextrose 50% Injectable 25 milliLiter(s) IV Push every 15 minutes  diltiazem    milliGRAM(s) Oral at bedtime  docusate sodium 100 milliGRAM(s) Oral three times a day  famotidine    Tablet 20 milliGRAM(s) Oral two times a day  fluticasone propionate 50 MICROgram(s)/spray Nasal Spray 1 Spray(s) Both Nostrils two times a day  furosemide    Tablet 40 milliGRAM(s) Oral daily  heparin  Injectable 5000 Unit(s) SubCutaneous every 8 hours  insulin glargine Injectable (LANTUS) 20 Unit(s) SubCutaneous every morning  insulin lispro (HumaLOG) corrective regimen sliding scale   SubCutaneous three times a day before meals  insulin lispro Injectable (HumaLOG) 4 Unit(s) SubCutaneous before breakfast  ipratropium    for Nebulization 500 MICROGram(s) Nebulizer every 6 hours  levothyroxine 112 MICROGram(s) Oral daily  magnesium sulfate  IVPB 1 Gram(s) IV Intermittent every 12 hours  metoprolol tartrate 25 milliGRAM(s) Oral two times a day  polyethylene glycol 3350 17 Gram(s) Oral daily  senna 1 Tablet(s) Oral two times a day  tacrolimus 0.5 milliGRAM(s) Oral every 12 hours  trimethoprim  160 mG/sulfamethoxazole 800 mG 1 Tablet(s) Oral every 12 hours  warfarin 1 milliGRAM(s) Oral once    MEDICATIONS  (PRN):  dextrose 40% Gel 15 Gram(s) Oral once PRN Blood Glucose LESS THAN 70 milliGRAM(s)/deciliter  glucagon  Injectable 1 milliGRAM(s) IntraMuscular once PRN Glucose LESS THAN 70 milligrams/deciliter  zolpidem 5 milliGRAM(s) Oral at bedtime PRN Insomnia    Allergies    No Known Drug Allergies  TEGADERM (Rash)    Intolerances    Ambulation/Activity Status:    Assessment/Plan:  79y Female status-post CABG x 5            POD #   9     - Case and plan discussed with CTU Intensivist and CT Surgeon - Dr. Mirza  - Continue CTU supportive care and ongoing plan of care as per continuing CTU rounds.    - Continue DVT/GI prophylaxis  - Incentive Spirometry 10 times an hour  - Continue to advance physical activity as tolerated and continue PT/OT as directed  1. CAD: Continue ASA, statin, BB  2. A. Fib: NSR, cont warfarin 1 milliGRAM(s) for anticoagulation.  3. COPD/Hypoxia: cont duoneb  4. DM/Glucose Control: cont insulin glargine Injectable (LANTUS) 20 Unit(s) SubCutaneous every morning/insulin lispro (HumaLOG) corrective regimen sliding scale   SubCutaneous three times a day before meals/insulin lispro Injectable (HumaLOG) 4 Unit(s) SubCutaneous before breakfast. Restart home dosing on discharge.  5. Continue tacrolimus 0.5 milliGRAM(s) Oral every 12 hours for hx of liver transplant. Tacrolimus level 7.5, within therapeutic range will discuss with transplant attending if dose should be increased.  D .Bili slightly elevated, f/u am LFTs.    Social Service Disposition: Home vs SNF. OPERATIVE PROCEDURE(s):                POD #    9  s/p CABG                   79yFemale  SURGEON(s): JUAREZ Mirza  SUBJECTIVE ASSESSMENT: doing well, ready for discharge, patient / family ? no happy with SNF sites  Vital Signs Last 24 Hrs  T(F): 96.9 (11 Oct 2019 05:00), Max: 96.9 (11 Oct 2019 05:00)  HR: 73 (11 Oct 2019 05:00) (60 - 80)  BP: 179/73 (11 Oct 2019 05:00) (120/58 - 189/77)  BP(mean): 101 (10 Oct 2019 20:00) (84 - 110)  RR: 18 (11 Oct 2019 05:00) (18 - 25)  SpO2: 96% (10 Oct 2019 22:01) (94% - 98%)    Net:   I&O's Detail    09 Oct 2019 07:01  -  10 Oct 2019 07:00  --------------------------------------------------------  Total NET: -705 mL      10 Oct 2019 07:01  -  11 Oct 2019 07:00  --------------------------------------------------------  Total NET: -620 mL        CAPILLARY BLOOD GLUCOSE      POCT Blood Glucose.: 126 mg/dL (11 Oct 2019 07:35)  POCT Blood Glucose.: 129 mg/dL (10 Oct 2019 22:02)  POCT Blood Glucose.: 90 mg/dL (10 Oct 2019 16:08)  POCT Blood Glucose.: 170 mg/dL (10 Oct 2019 11:10)    Physical Exam:  General: NAD; A&Ox3  Cardiac: S1/S2, RRR, no murmur, no rubs  Lungs: unlabored respirations, bilateral bs, decreased left lower  Abdomen: Soft/NT/ND; positive bowel sounds x 4  Sternum: Intact, no click, incision healing well with no drainage  Incisions: Incisions clean/dry/intact  Extremities: No edema b/l lower extremities;     BOWEL MOVEMENT:  Today      LABS:                        10.2<L>  10.37 )-----------( 272      ( 11 Oct 2019 01:02 )             31.4<L>                        11.0<L>  10.26 )-----------( 270      ( 10 Oct 2019 02:00 )             33.1<L>    10    135  |  98  |  21<H>  ----------------------------<  122<H>  5.1<H>   |  26  |  1.1  10-10    136  |  98  |  22<H>  ----------------------------<  112<H>  5.0   |  24  |  0.9    Ca    10.1      11 Oct 2019 01:02  Mg     2.3     1011    TPro  5.9<L> [6.0 - 8.0]  /  Alb  3.7 [3.5 - 5.2]  /  TBili  1.1 [0.2 - 1.2]  /  DBili  0.3<H> [0.0 - 0.2]  /  AST  13 [0 - 41]  /  ALT  9 [0 - 41]  /  AlkPhos  90 [30 - 115]  10-11    PT/INR - ( 11 Oct 2019 01:02 )   PT: ;   INR: 2.02 ratio       PT/INR - ( 10 Oct 2019 04:50 )   PT: ;   INR: 1.75 ratio         Urinalysis Basic - ( 10 Oct 2019 11:19 )    Color: Yellow / Appearance: Turbid / S.013 / pH: x  Gluc: x / Ketone: Trace  / Bili: Negative / Urobili: <2 mg/dL   Blood: x / Protein: 100 mg/dL / Nitrite: Negative   Leuk Esterase: Large / RBC: 104 /HPF / WBC >720 /HPF   Sq Epi: x / Non Sq Epi: 7 /HPF / Bacteria: Many    RADIOLOGY & ADDITIONAL TESTS:  CXR: < from: Xray Chest 2 Views PA/Lat (10.10.19 @ 09:45) >  Impression:      Unchanged bibasilar opacities/pleural effusions.    EKG: < from: 12 Lead ECG (10.10.19 @ 08:16) >  Ventricular Rate 75 BPM    Atrial Rate 75 BPM    P-R Interval 96 ms    QRS Duration 72 ms    Q-T Interval 320 ms    QTC Calculation(Bezet) 357 ms    P Axis -22 degrees    R Axis -4 degrees    T Axis 224 degrees    Diagnosis Line Electronic atrial pacemaker  ST & T wave abnormality, consider lateral ischemia  Abnormal ECG    < end of copied text >    MEDICATIONS  (STANDING):  aspirin enteric coated 81 milliGRAM(s) Oral daily  atorvastatin 10 milliGRAM(s) Oral at bedtime  BACItracin   Ointment 1 Application(s) Topical two times a day  chlorhexidine 4% Liquid 1 Application(s) Topical <User Schedule>  dextrose 5%. 1000 milliLiter(s) (50 mL/Hr) IV Continuous <Continuous>  dextrose 50% Injectable 50 milliLiter(s) IV Push every 15 minutes  dextrose 50% Injectable 25 milliLiter(s) IV Push every 15 minutes  diltiazem    milliGRAM(s) Oral at bedtime  docusate sodium 100 milliGRAM(s) Oral three times a day  famotidine    Tablet 20 milliGRAM(s) Oral two times a day  fluticasone propionate 50 MICROgram(s)/spray Nasal Spray 1 Spray(s) Both Nostrils two times a day  furosemide    Tablet 40 milliGRAM(s) Oral daily  heparin  Injectable 5000 Unit(s) SubCutaneous every 8 hours  insulin glargine Injectable (LANTUS) 20 Unit(s) SubCutaneous every morning  insulin lispro (HumaLOG) corrective regimen sliding scale   SubCutaneous three times a day before meals  insulin lispro Injectable (HumaLOG) 4 Unit(s) SubCutaneous before breakfast  ipratropium    for Nebulization 500 MICROGram(s) Nebulizer every 6 hours  levothyroxine 112 MICROGram(s) Oral daily  magnesium sulfate  IVPB 1 Gram(s) IV Intermittent every 12 hours  metoprolol tartrate 25 milliGRAM(s) Oral two times a day  polyethylene glycol 3350 17 Gram(s) Oral daily  senna 1 Tablet(s) Oral two times a day  tacrolimus 0.5 milliGRAM(s) Oral every 12 hours  trimethoprim  160 mG/sulfamethoxazole 800 mG 1 Tablet(s) Oral every 12 hours  warfarin 1 milliGRAM(s) Oral once    MEDICATIONS  (PRN):  dextrose 40% Gel 15 Gram(s) Oral once PRN Blood Glucose LESS THAN 70 milliGRAM(s)/deciliter  glucagon  Injectable 1 milliGRAM(s) IntraMuscular once PRN Glucose LESS THAN 70 milligrams/deciliter  zolpidem 5 milliGRAM(s) Oral at bedtime PRN Insomnia    Allergies    No Known Drug Allergies  TEGADERM (Rash)    Intolerances    Ambulation/Activity Status:    Assessment/Plan:  79y Female status-post CABG x 5            POD #   9     - Case and plan discussed with CTU Intensivist and CT Surgeon - Dr. Mirza  - Continue CTU supportive care and ongoing plan of care as per continuing CTU rounds.    - Continue DVT/GI prophylaxis  - Incentive Spirometry 10 times an hour  - Continue to advance physical activity as tolerated and continue PT/OT as directed  1. CAD: Continue ASA, statin, BB,  2. A. Fib: NSR, cont warfarin 1 milliGRAM(s) for anticoagulation.  3. COPD/Hypoxia: cont duoneb  4. DM/Glucose Control: cont insulin glargine Injectable (LANTUS) 20 Unit(s) SubCutaneous every morning/insulin lispro (HumaLOG) corrective regimen sliding scale   SubCutaneous three times a day before meals/insulin lispro Injectable (HumaLOG) 4 Unit(s) SubCutaneous before breakfast. Restart home dosing on discharge.  5. Continue tacrolimus 0.5 milliGRAM(s) Oral every 12 hours for hx of liver transplant. Tacrolimus level 7.5, within therapeutic range will discuss with transplant attending if dose should be increased.  D .Bili slightly elevated, f/u am LFTs.  6. Hypertensive will increase BB tomorrow if Diltiazem doesn't decrease pressure  7. repeat CXR to monitor left pleural effusion in AM    Social Service Disposition: Home vs SNF. OPERATIVE PROCEDURE(s):                POD #    9  s/p CABG                   79yFemale  SURGEON(s): JUAREZ Mirza  SUBJECTIVE ASSESSMENT: doing well, ready for discharge, patient / family ? not happy with SNF sites  Vital Signs Last 24 Hrs  T(F): 96.9 (11 Oct 2019 05:00), Max: 96.9 (11 Oct 2019 05:00)  HR: 73 (11 Oct 2019 05:00) (60 - 80)  BP: 179/73 (11 Oct 2019 05:00) (120/58 - 189/77)  BP(mean): 101 (10 Oct 2019 20:00) (84 - 110)  RR: 18 (11 Oct 2019 05:00) (18 - 25)  SpO2: 96% (10 Oct 2019 22:01) (94% - 98%)    Net:   I&O's Detail    09 Oct 2019 07:01  -  10 Oct 2019 07:00  --------------------------------------------------------  Total NET: -705 mL      10 Oct 2019 07:01  -  11 Oct 2019 07:00  --------------------------------------------------------  Total NET: -620 mL        CAPILLARY BLOOD GLUCOSE      POCT Blood Glucose.: 126 mg/dL (11 Oct 2019 07:35)  POCT Blood Glucose.: 129 mg/dL (10 Oct 2019 22:02)  POCT Blood Glucose.: 90 mg/dL (10 Oct 2019 16:08)  POCT Blood Glucose.: 170 mg/dL (10 Oct 2019 11:10)    Physical Exam:  General: NAD; A&Ox3  Cardiac: S1/S2, RRR, no murmur, no rubs  Lungs: unlabored respirations, bilateral bs, decreased left lower  Abdomen: Soft/NT/ND; positive bowel sounds x 4  Sternum: Intact, no click, incision healing well with no drainage  Incisions: Incisions clean/dry/intact  Extremities: No edema b/l lower extremities;     BOWEL MOVEMENT:  Today      LABS:                        10.2<L>  10.37 )-----------( 272      ( 11 Oct 2019 01:02 )             31.4<L>                        11.0<L>  10.26 )-----------( 270      ( 10 Oct 2019 02:00 )             33.1<L>    1011    135  |  98  |  21<H>  ----------------------------<  122<H>  5.1<H>   |  26  |  1.1    eGFR if Non : 47: Interpretative comment  The units for eGFR are mL/min/1.73M2 (normalized body surface area). The  eGFR is calculated from a serum creatinine using the CKD-EPI equation.  Other variables required for calculation are race, age and sex. Among  patients with chronic kidney disease (CKD), the eGFR is useful in  determining the stage of disease according to KDOQI CKD classification.  All eGFR results are reported numerically with the following  interpretation.          GFR                    With                 Without     (ml/min/1.73 m2)    Kidney Damage       Kidney Damage        >= 90                    Stage 1                     Normal        60-89                    Stage 2                     Decreased GFR        30-59     Stage 3                     Stage 3        15-29                    Stage 4                     Stage 4        < 15                      Stage 5                     Stage 5  Each stage of CKD assumes that the associated GFR level has been in  effect for at least 3 months. Determination of stages one and two (with  eGFR > 59 ml/min/m2) requires estimation of kidney damage for at least 3  months as defined by structural or functional abnormalities.  Limitations: All estimates of GFR will be less accurate for patients at  extremes of muscle mass (including but not limited to frail elderly,  critically ill, or cancer patients), those with unusual diets, and those  with conditions associated with reduced secretion or extrarenal  elimination of creatinine. The eGFR equation is not recommended for use  in patients with unstable creatinine levels. mL/min/1.73M2 (10.11.19 @ 01:02)    1010    136  |  98  |  22<H>  ----------------------------<  112<H>  5.0   |  24  |  0.9    Ca    10.1      11 Oct 2019 01:02  Mg     2.3     10-11    TPro  5.9<L> [6.0 - 8.0]  /  Alb  3.7 [3.5 - 5.2]  /  TBili  1.1 [0.2 - 1.2]  /  DBili  0.3<H> [0.0 - 0.2]  /  AST  13 [0 - 41]  /  ALT  9 [0 - 41]  /  AlkPhos  90 [30 - 115]  10-11    PT/INR - ( 11 Oct 2019 01:02 )   PT: ;   INR: 2.02 ratio      PT/INR - ( 10 Oct 2019 04:50 )   PT: ;   INR: 1.75 ratio      Tacrolimus (), Serum (10.10.19 @ 04:50)    Tacrolimus (), Serum: 7.5: Tacrolimus testing is performed on the [employer]  by  chemiluminescent microparticle immunoassay. The therapeutic range of  tacrolimus is not clearly defined but target 12-hour trough whole blood  concentrations are 5.0 - 20.0 ng/mL early post transplant. Twenty-four  hour  trough concentrations are 33-50% less than the corresponding 12-hour  trough. ng/mL    Urinalysis Basic - ( 10 Oct 2019 11:19 )    Color: Yellow / Appearance: Turbid / S.013 / pH: x  Gluc: x / Ketone: Trace  / Bili: Negative / Urobili: <2 mg/dL   Blood: x / Protein: 100 mg/dL / Nitrite: Negative   Leuk Esterase: Large / RBC: 104 /HPF / WBC >720 /HPF   Sq Epi: x / Non Sq Epi: 7 /HPF / Bacteria: Many    RADIOLOGY & ADDITIONAL TESTS:  CXR: < from: Xray Chest 2 Views PA/Lat (10.10.19 @ 09:45) >  Impression:      Unchanged bibasilar opacities/pleural effusions.    EKG: < from: 12 Lead ECG (10.10.19 @ 08:16) >  Ventricular Rate 75 BPM    Atrial Rate 75 BPM    P-R Interval 96 ms    QRS Duration 72 ms    Q-T Interval 320 ms    QTC Calculation(Bezet) 357 ms    P Axis -22 degrees    R Axis -4 degrees    T Axis 224 degrees    Diagnosis Line Electronic atrial pacemaker  ST & T wave abnormality, consider lateral ischemia  Abnormal ECG    < end of copied text >    MEDICATIONS  (STANDING):  aspirin enteric coated 81 milliGRAM(s) Oral daily  atorvastatin 10 milliGRAM(s) Oral at bedtime  BACItracin   Ointment 1 Application(s) Topical two times a day  chlorhexidine 4% Liquid 1 Application(s) Topical <User Schedule>  dextrose 5%. 1000 milliLiter(s) (50 mL/Hr) IV Continuous <Continuous>  dextrose 50% Injectable 50 milliLiter(s) IV Push every 15 minutes  dextrose 50% Injectable 25 milliLiter(s) IV Push every 15 minutes  diltiazem    milliGRAM(s) Oral at bedtime  docusate sodium 100 milliGRAM(s) Oral three times a day  famotidine    Tablet 20 milliGRAM(s) Oral two times a day  fluticasone propionate 50 MICROgram(s)/spray Nasal Spray 1 Spray(s) Both Nostrils two times a day  furosemide    Tablet 40 milliGRAM(s) Oral daily  heparin  Injectable 5000 Unit(s) SubCutaneous every 8 hours  insulin glargine Injectable (LANTUS) 20 Unit(s) SubCutaneous every morning  insulin lispro (HumaLOG) corrective regimen sliding scale   SubCutaneous three times a day before meals  insulin lispro Injectable (HumaLOG) 4 Unit(s) SubCutaneous before breakfast  ipratropium    for Nebulization 500 MICROGram(s) Nebulizer every 6 hours  levothyroxine 112 MICROGram(s) Oral daily  magnesium sulfate  IVPB 1 Gram(s) IV Intermittent every 12 hours  metoprolol tartrate 25 milliGRAM(s) Oral two times a day  polyethylene glycol 3350 17 Gram(s) Oral daily  senna 1 Tablet(s) Oral two times a day  tacrolimus 0.5 milliGRAM(s) Oral every 12 hours  trimethoprim  160 mG/sulfamethoxazole 800 mG 1 Tablet(s) Oral every 12 hours  warfarin 1 milliGRAM(s) Oral once    MEDICATIONS  (PRN):  dextrose 40% Gel 15 Gram(s) Oral once PRN Blood Glucose LESS THAN 70 milliGRAM(s)/deciliter  glucagon  Injectable 1 milliGRAM(s) IntraMuscular once PRN Glucose LESS THAN 70 milligrams/deciliter  zolpidem 5 milliGRAM(s) Oral at bedtime PRN Insomnia    Allergies    No Known Drug Allergies  TEGADERM (Rash)    Intolerances    Ambulation/Activity Status:    Assessment/Plan:  79y Female status-post CABG x 5            POD #   9     - Case and plan discussed with CTU Intensivist and CT Surgeon - Dr. Mirza  - Continue CTU supportive care and ongoing plan of care as per continuing CTU rounds.    - Continue DVT/GI prophylaxis  - Incentive Spirometry 10 times an hour  - Continue to advance physical activity as tolerated and continue PT/OT as directed  1. CAD: Continue ASA, statin, BB,  2. A. Fib: NSR, cont warfarin 1 milliGRAM(s) for anticoagulation.  3. COPD/Hypoxia: cont duoneb  4. DM/Glucose Control: cont insulin glargine Injectable (LANTUS) 20 Unit(s) SubCutaneous every morning/insulin lispro (HumaLOG) corrective regimen sliding scale   SubCutaneous three times a day before meals/insulin lispro Injectable (HumaLOG) 4 Unit(s) SubCutaneous before breakfast. Restart home dosing on discharge.  5. Continue tacrolimus 0.5 milliGRAM(s) Oral every 12 hours for hx of liver transplant. Tacrolimus level 7.5, within therapeutic range will discuss with transplant attending if dose should be increased.  D .Bili slightly elevated, f/u am LFTs.  6. Hypertensive will increase BB tomorrow if Diltiazem doesn't decrease pressure  7. repeat CXR to monitor left pleural effusion in AM    Social Service Disposition: Home vs SNF. OPERATIVE PROCEDURE(s):                POD #    9  s/p CABG                   79yFemale  SURGEON(s): JUAREZ Mirza  SUBJECTIVE ASSESSMENT: doing well, ready for discharge, patient / family ? not happy with SNF sites  Vital Signs Last 24 Hrs  T(F): 96.9 (11 Oct 2019 05:00), Max: 96.9 (11 Oct 2019 05:00)  HR: 73 (11 Oct 2019 05:00) (60 - 80)  BP: 179/73 (11 Oct 2019 05:00) (120/58 - 189/77)  BP(mean): 101 (10 Oct 2019 20:00) (84 - 110)  RR: 18 (11 Oct 2019 05:00) (18 - 25)  SpO2: 96% (10 Oct 2019 22:01) (94% - 98%)    Net:   I&O's Detail    09 Oct 2019 07:01  -  10 Oct 2019 07:00  --------------------------------------------------------  Total NET: -705 mL      10 Oct 2019 07:01  -  11 Oct 2019 07:00  --------------------------------------------------------  Total NET: -620 mL        CAPILLARY BLOOD GLUCOSE      POCT Blood Glucose.: 126 mg/dL (11 Oct 2019 07:35)  POCT Blood Glucose.: 129 mg/dL (10 Oct 2019 22:02)  POCT Blood Glucose.: 90 mg/dL (10 Oct 2019 16:08)  POCT Blood Glucose.: 170 mg/dL (10 Oct 2019 11:10)    Physical Exam:  General: NAD; A&Ox3  Cardiac: S1/S2, RRR, no murmur, no rubs  Lungs: unlabored respirations, bilateral bs, decreased left lower  Abdomen: Soft/NT/ND; positive bowel sounds x 4  Sternum: Intact, no click, incision healing well with no drainage  Incisions: Incisions clean/dry/intact  Extremities: No edema b/l lower extremities;     BOWEL MOVEMENT:  Today      LABS:                        10.2<L>  10.37 )-----------( 272      ( 11 Oct 2019 01:02 )             31.4<L>                        11.0<L>  10.26 )-----------( 270      ( 10 Oct 2019 02:00 )             33.1<L>    1011    135  |  98  |  21<H>  ----------------------------<  122<H>  5.1<H>   |  26  |  1.1    eGFR if Non : 47: Interpretative comment  The units for eGFR are mL/min/1.73M2 (normalized body surface area). The  eGFR is calculated from a serum creatinine using the CKD-EPI equation.  Other variables required for calculation are race, age and sex. Among  patients with chronic kidney disease (CKD), the eGFR is useful in  determining the stage of disease according to KDOQI CKD classification.  All eGFR results are reported numerically with the following  interpretation.          GFR                    With                 Without     (ml/min/1.73 m2)    Kidney Damage       Kidney Damage        >= 90                    Stage 1                     Normal        60-89                    Stage 2                     Decreased GFR        30-59     Stage 3                     Stage 3        15-29                    Stage 4                     Stage 4        < 15                      Stage 5                     Stage 5  Each stage of CKD assumes that the associated GFR level has been in  effect for at least 3 months. Determination of stages one and two (with  eGFR > 59 ml/min/m2) requires estimation of kidney damage for at least 3  months as defined by structural or functional abnormalities.  Limitations: All estimates of GFR will be less accurate for patients at  extremes of muscle mass (including but not limited to frail elderly,  critically ill, or cancer patients), those with unusual diets, and those  with conditions associated with reduced secretion or extrarenal  elimination of creatinine. The eGFR equation is not recommended for use  in patients with unstable creatinine levels. mL/min/1.73M2 (10.11.19 @ 01:02)    1010    136  |  98  |  22<H>  ----------------------------<  112<H>  5.0   |  24  |  0.9    Ca    10.1      11 Oct 2019 01:02  Mg     2.3     10-11    TPro  5.9<L> [6.0 - 8.0]  /  Alb  3.7 [3.5 - 5.2]  /  TBili  1.1 [0.2 - 1.2]  /  DBili  0.3<H> [0.0 - 0.2]  /  AST  13 [0 - 41]  /  ALT  9 [0 - 41]  /  AlkPhos  90 [30 - 115]  10-11    PT/INR - ( 11 Oct 2019 01:02 )   PT: ;   INR: 2.02 ratio      PT/INR - ( 10 Oct 2019 04:50 )   PT: ;   INR: 1.75 ratio      Tacrolimus (), Serum (10.10.19 @ 04:50)    Tacrolimus (), Serum: 7.5: Tacrolimus testing is performed on the [employer]  by  chemiluminescent microparticle immunoassay. The therapeutic range of  tacrolimus is not clearly defined but target 12-hour trough whole blood  concentrations are 5.0 - 20.0 ng/mL early post transplant. Twenty-four  hour  trough concentrations are 33-50% less than the corresponding 12-hour  trough. ng/mL    Urinalysis Basic - ( 10 Oct 2019 11:19 )    Color: Yellow / Appearance: Turbid / S.013 / pH: x  Gluc: x / Ketone: Trace  / Bili: Negative / Urobili: <2 mg/dL   Blood: x / Protein: 100 mg/dL / Nitrite: Negative   Leuk Esterase: Large / RBC: 104 /HPF / WBC >720 /HPF   Sq Epi: x / Non Sq Epi: 7 /HPF / Bacteria: Many    RADIOLOGY & ADDITIONAL TESTS:  CXR: < from: Xray Chest 2 Views PA/Lat (10.10.19 @ 09:45) >  Impression:      Unchanged bibasilar opacities/pleural effusions.    EKG: < from: 12 Lead ECG (10.10.19 @ 08:16) >  Ventricular Rate 75 BPM    Atrial Rate 75 BPM    P-R Interval 96 ms    QRS Duration 72 ms    Q-T Interval 320 ms    QTC Calculation(Bezet) 357 ms    P Axis -22 degrees    R Axis -4 degrees    T Axis 224 degrees    Diagnosis Line Electronic atrial pacemaker  ST & T wave abnormality, consider lateral ischemia  Abnormal ECG    < end of copied text >    MEDICATIONS  (STANDING):  aspirin enteric coated 81 milliGRAM(s) Oral daily  atorvastatin 10 milliGRAM(s) Oral at bedtime  BACItracin   Ointment 1 Application(s) Topical two times a day  chlorhexidine 4% Liquid 1 Application(s) Topical <User Schedule>  dextrose 5%. 1000 milliLiter(s) (50 mL/Hr) IV Continuous <Continuous>  dextrose 50% Injectable 50 milliLiter(s) IV Push every 15 minutes  dextrose 50% Injectable 25 milliLiter(s) IV Push every 15 minutes  diltiazem    milliGRAM(s) Oral at bedtime  docusate sodium 100 milliGRAM(s) Oral three times a day  famotidine    Tablet 20 milliGRAM(s) Oral two times a day  fluticasone propionate 50 MICROgram(s)/spray Nasal Spray 1 Spray(s) Both Nostrils two times a day  furosemide    Tablet 40 milliGRAM(s) Oral daily  heparin  Injectable 5000 Unit(s) SubCutaneous every 8 hours  insulin glargine Injectable (LANTUS) 20 Unit(s) SubCutaneous every morning  insulin lispro (HumaLOG) corrective regimen sliding scale   SubCutaneous three times a day before meals  insulin lispro Injectable (HumaLOG) 4 Unit(s) SubCutaneous before breakfast  ipratropium    for Nebulization 500 MICROGram(s) Nebulizer every 6 hours  levothyroxine 112 MICROGram(s) Oral daily  magnesium sulfate  IVPB 1 Gram(s) IV Intermittent every 12 hours  metoprolol tartrate 25 milliGRAM(s) Oral two times a day  polyethylene glycol 3350 17 Gram(s) Oral daily  senna 1 Tablet(s) Oral two times a day  tacrolimus 0.5 milliGRAM(s) Oral every 12 hours  trimethoprim  160 mG/sulfamethoxazole 800 mG 1 Tablet(s) Oral every 12 hours  warfarin 1 milliGRAM(s) Oral once    MEDICATIONS  (PRN):  dextrose 40% Gel 15 Gram(s) Oral once PRN Blood Glucose LESS THAN 70 milliGRAM(s)/deciliter  glucagon  Injectable 1 milliGRAM(s) IntraMuscular once PRN Glucose LESS THAN 70 milligrams/deciliter  zolpidem 5 milliGRAM(s) Oral at bedtime PRN Insomnia    Allergies    No Known Drug Allergies  TEGADERM (Rash)    Intolerances    Ambulation/Activity Status:    Assessment/Plan:  79y Female status-post CABG x 5            POD #   9     - Case and plan discussed with CTU Intensivist and CT Surgeon - Dr. Mirza  - Continue CTU supportive care and ongoing plan of care as per continuing CTU rounds.    - Continue DVT/GI prophylaxis  - Incentive Spirometry 10 times an hour  - Continue to advance physical activity as tolerated and continue PT/OT as directed  1. CAD: Continue ASA, statin, BB,  2. A. Fib: NSR, cont warfarin 1 milliGRAM(s) for anticoagulation.  3. COPD/Hypoxia: cont duoneb  4. DM/Glucose Control: cont insulin glargine Injectable (LANTUS) 20 Unit(s) SubCutaneous every morning/insulin lispro (HumaLOG) corrective regimen sliding scale   SubCutaneous three times a day before meals/insulin lispro Injectable (HumaLOG) 4 Unit(s) SubCutaneous before breakfast. Restart home dosing on discharge.  5. Hx of liver transplant: Decrease tacrolimus to 0.5 milliGRAM(s) Oral every 24 hours for Tacrolimus level 7.5, therapeutic target range 1-3 as per Dr. Graves and transplant team recommendation discussed via phone conversation after faxing current lab profile. Contact info ([facility] Nurse coordinator [phone])/Fax([phone].  6. Hypertensive will increase BB tomorrow if Diltiazem doesn't decrease pressure  7. repeat CXR to monitor left pleural effusion in AM    Social Service Disposition: Home vs SNF.

## 2019-10-11 NOTE — CHART NOTE - NSCHARTNOTEFT_GEN_A_CORE
Registered Dietitian Follow-Up     Patient Profile Reviewed                           Yes [x]   No []     Nutrition History Previously Obtained        Yes [x]  No []       Pertinent Subjective Information:     Pertinent Medical Interventions: CAD-s/p CABG x 5-POD #8. -BP control- beta-blockers and cardiazam. Glucose control- on insulin. Thrombocytopenia-stable. Dispo planning for SNF.      Diet order: DASH/TLC, Glucerna TID     Anthropometrics:  - Ht. 129.5cm  - Wt. 70.9kg on 10/10 vs.   - %wt change  - BMI 40.5  - IBW     Pertinent Lab Data: (10/10) RBC 3.52, Hg 10.2, Hct 31.4, K 5.1, BUN 21, eGFR 47      Pertinent Meds: Heparin, Glargine, Lispro, Lasix, Metoprolol, Atorvastatin, Senna, Pepcid, Levothyroxine, Colace, Miralax      Physical Findings:  - Appearance: alert&oriented, 1+ edema to L, R leg  - GI function:  no symptoms noted, last BM 10/10  - Tubes: none noted  - Oral/Mouth cavity: no symptoms noted  - Skin: ecchymosis (BS 20)      Nutrition Requirements (from RD note on 10/8)   Weight Used: 68kg     Estimated Energy Needs    Continue [x]  Adjust []  7304-2818 kcal/day (MSJ x 1.2-1.3 AF)  Adjusted Energy Recommendations:   kcal/day        Estimated Protein Needs    Continue [x]  Adjust [] 68-82 gm/day (1-1.2 gm/kg CBW)  Adjusted Protein Recommendations:   gm/day        Estimated Fluid Needs        Continue []  Adjust [x] 1ml/kcal or per LIP  Adjusted Fluid Recommendations:   mL/day     Nutrient Intake: ~75% PO at meals         [] Previous Nutrition Diagnosis: Inadequate Oral Intake            [] Ongoing          [x] Resolved       Nutrition Intervention: meals and snacks, medical food supplement    Rec: Continue DASH/TLC diet order and discontinue Glucerna TID     Goal/Expected Outcome:     Indicator/Monitoring: energy intake, body composition, NFPF, glucose profile Registered Dietitian Follow-Up     Patient Profile Reviewed                           Yes [x]   No []     Nutrition History Previously Obtained        Yes [x]  No []       Pertinent Subjective Information: Pt. reports PO intake much improved at this time. No complains.      Pertinent Medical Interventions: CAD-s/p CABG x 5-POD #8. -BP control- beta-blockers and cardiazam. Glucose control- on insulin. Thrombocytopenia-stable. Dispo planning for SNF.      Diet order: DASH/TLC, Glucerna TID     Anthropometrics:  - Ht. 129.5cm  - Wt. 70.9kg on 10/10 vs. 67.9kg dosing weight, pt. with edema, will continue to monitor   - %wt change  - BMI 40.5  - IBW     Pertinent Lab Data: (10/10) RBC 3.52, Hg 10.2, Hct 31.4, K 5.1, BUN 21, eGFR 47      Pertinent Meds: Heparin, Glargine, Lispro, Lasix, Metoprolol, Atorvastatin, Senna, Pepcid, Levothyroxine, Colace, Miralax      Physical Findings:  - Appearance: alert&oriented, 1+ edema to L, R leg  - GI function:  no symptoms noted, last BM 10/10  - Tubes: none noted  - Oral/Mouth cavity: no symptoms noted  - Skin: ecchymosis (BS 20)      Nutrition Requirements (from RD note on 10/8)   Weight Used: 68kg     Estimated Energy Needs    Continue [x]  Adjust []  8116-9001 kcal/day (MSJ x 1.2-1.3 AF)  Adjusted Energy Recommendations:   kcal/day        Estimated Protein Needs    Continue [x]  Adjust [] 68-82 gm/day (1-1.2 gm/kg CBW)  Adjusted Protein Recommendations:   gm/day        Estimated Fluid Needs        Continue []  Adjust [x] 1ml/kcal or per LIP  Adjusted Fluid Recommendations:   mL/day     Nutrient Intake: ~75% PO at meals         [] Previous Nutrition Diagnosis: Inadequate Oral Intake            [] Ongoing          [x] Resolved       Nutrition Intervention: meals and snacks, medical food supplement    Rec: Continue DASH/TLC diet order and discontinue Glucerna TID     Goal/Expected Outcome: In 7 days pt. to continue to consume at least 75% PO at meals      Indicator/Monitoring: energy intake, body composition, NFPF, glucose profile Registered Dietitian Follow-Up     Patient Profile Reviewed                           Yes [x]   No []     Nutrition History Previously Obtained        Yes [x]  No []       Pertinent Subjective Information: Pt. reports PO intake much improved at this time. No complains. Previously recommended diet and supplement modification has not been implemented.      Pertinent Medical Interventions: CAD-s/p CABG x 5-POD #8. -BP control- beta-blockers and cardiazam. Glucose control- on insulin. Thrombocytopenia-stable. Dispo planning for SNF.      Diet order: DASH/TLC, Glucerna TID     Anthropometrics:  - Ht. 129.5cm  - Wt. 70.9kg on 10/10 vs. 67.9kg dosing weight, pt. with edema, will continue to monitor   - %wt change  - BMI 40.5  - IBW     Pertinent Lab Data: (10/10) RBC 3.52, Hg 10.2, Hct 31.4, K 5.1, BUN 21, eGFR 47      Pertinent Meds: Heparin, Glargine, Lispro, Lasix, Metoprolol, Atorvastatin, Senna, Pepcid, Levothyroxine, Colace, Miralax      Physical Findings:  - Appearance: alert&oriented, 1+ edema to L, R leg  - GI function:  no symptoms noted, last BM 10/10  - Tubes: none noted  - Oral/Mouth cavity: no symptoms noted  - Skin: ecchymosis (BS 20)      Nutrition Requirements (from RD note on 10/8)   Weight Used: 68kg     Estimated Energy Needs    Continue [x]  Adjust []  3485-4911 kcal/day (MSJ x 1.2-1.3 AF)  Adjusted Energy Recommendations:   kcal/day        Estimated Protein Needs    Continue [x]  Adjust [] 68-82 gm/day (1-1.2 gm/kg CBW)  Adjusted Protein Recommendations:   gm/day        Estimated Fluid Needs        Continue []  Adjust [x] 1ml/kcal or per LIP  Adjusted Fluid Recommendations:   mL/day     Nutrient Intake: ~75% PO at meals         [] Previous Nutrition Diagnosis: Inadequate Oral Intake            [] Ongoing          [x] Resolved       Nutrition Intervention: meals and snacks, medical food supplement    Rec: Continue DASH/TLC diet order and add carb consistent w/snack modifier and discontinue Glucerna TID     Goal/Expected Outcome: In 7 days pt. to continue to consume at least 75% PO at meals      Indicator/Monitoring: energy intake, body composition, NFPF, glucose profile

## 2019-10-12 LAB
ALBUMIN SERPL ELPH-MCNC: 3.6 G/DL — SIGNIFICANT CHANGE UP (ref 3.5–5.2)
ALP SERPL-CCNC: 77 U/L — SIGNIFICANT CHANGE UP (ref 30–115)
ALT FLD-CCNC: 8 U/L — SIGNIFICANT CHANGE UP (ref 0–41)
ANION GAP SERPL CALC-SCNC: 11 MMOL/L — SIGNIFICANT CHANGE UP (ref 7–14)
AST SERPL-CCNC: 13 U/L — SIGNIFICANT CHANGE UP (ref 0–41)
BILIRUB DIRECT SERPL-MCNC: 0.2 MG/DL — SIGNIFICANT CHANGE UP (ref 0–0.2)
BILIRUB INDIRECT FLD-MCNC: 0.6 MG/DL — SIGNIFICANT CHANGE UP (ref 0.2–1.2)
BILIRUB SERPL-MCNC: 0.8 MG/DL — SIGNIFICANT CHANGE UP (ref 0.2–1.2)
BUN SERPL-MCNC: 18 MG/DL — SIGNIFICANT CHANGE UP (ref 10–20)
CALCIUM SERPL-MCNC: 10 MG/DL — SIGNIFICANT CHANGE UP (ref 8.5–10.1)
CHLORIDE SERPL-SCNC: 97 MMOL/L — LOW (ref 98–110)
CO2 SERPL-SCNC: 25 MMOL/L — SIGNIFICANT CHANGE UP (ref 17–32)
CREAT SERPL-MCNC: 1.2 MG/DL — SIGNIFICANT CHANGE UP (ref 0.7–1.5)
GLUCOSE BLDC GLUCOMTR-MCNC: 119 MG/DL — HIGH (ref 70–99)
GLUCOSE BLDC GLUCOMTR-MCNC: 173 MG/DL — HIGH (ref 70–99)
GLUCOSE BLDC GLUCOMTR-MCNC: 196 MG/DL — HIGH (ref 70–99)
GLUCOSE BLDC GLUCOMTR-MCNC: 205 MG/DL — HIGH (ref 70–99)
GLUCOSE BLDC GLUCOMTR-MCNC: 92 MG/DL — SIGNIFICANT CHANGE UP (ref 70–99)
GLUCOSE SERPL-MCNC: 110 MG/DL — HIGH (ref 70–99)
HCT VFR BLD CALC: 29.2 % — LOW (ref 37–47)
HGB BLD-MCNC: 9.4 G/DL — LOW (ref 12–16)
INR BLD: 1.85 RATIO — HIGH (ref 0.65–1.3)
MAGNESIUM SERPL-MCNC: 2.1 MG/DL — SIGNIFICANT CHANGE UP (ref 1.8–2.4)
MCHC RBC-ENTMCNC: 28.7 PG — SIGNIFICANT CHANGE UP (ref 27–31)
MCHC RBC-ENTMCNC: 32.2 G/DL — SIGNIFICANT CHANGE UP (ref 32–37)
MCV RBC AUTO: 89.3 FL — SIGNIFICANT CHANGE UP (ref 81–99)
NRBC # BLD: 0 /100 WBCS — SIGNIFICANT CHANGE UP (ref 0–0)
PLATELET # BLD AUTO: 258 K/UL — SIGNIFICANT CHANGE UP (ref 130–400)
POTASSIUM SERPL-MCNC: 4.8 MMOL/L — SIGNIFICANT CHANGE UP (ref 3.5–5)
POTASSIUM SERPL-SCNC: 4.8 MMOL/L — SIGNIFICANT CHANGE UP (ref 3.5–5)
PROT SERPL-MCNC: 5.7 G/DL — LOW (ref 6–8)
PROTHROM AB SERPL-ACNC: 21.1 SEC — HIGH (ref 9.95–12.87)
RBC # BLD: 3.27 M/UL — LOW (ref 4.2–5.4)
RBC # FLD: 14.8 % — HIGH (ref 11.5–14.5)
SODIUM SERPL-SCNC: 133 MMOL/L — LOW (ref 135–146)
WBC # BLD: 9.01 K/UL — SIGNIFICANT CHANGE UP (ref 4.8–10.8)
WBC # FLD AUTO: 9.01 K/UL — SIGNIFICANT CHANGE UP (ref 4.8–10.8)

## 2019-10-12 PROCEDURE — 71045 X-RAY EXAM CHEST 1 VIEW: CPT | Mod: 26

## 2019-10-12 RX ORDER — ACETAMINOPHEN 500 MG
325 TABLET ORAL EVERY 4 HOURS
Refills: 0 | Status: DISCONTINUED | OUTPATIENT
Start: 2019-10-12 | End: 2019-10-18

## 2019-10-12 RX ORDER — WARFARIN SODIUM 2.5 MG/1
2 TABLET ORAL ONCE
Refills: 0 | Status: COMPLETED | OUTPATIENT
Start: 2019-10-12 | End: 2019-10-12

## 2019-10-12 RX ADMIN — Medication 100 GRAM(S): at 05:39

## 2019-10-12 RX ADMIN — HEPARIN SODIUM 5000 UNIT(S): 5000 INJECTION INTRAVENOUS; SUBCUTANEOUS at 14:12

## 2019-10-12 RX ADMIN — Medication 2: at 11:39

## 2019-10-12 RX ADMIN — FAMOTIDINE 20 MILLIGRAM(S): 10 INJECTION INTRAVENOUS at 05:42

## 2019-10-12 RX ADMIN — Medication 1 TABLET(S): at 18:52

## 2019-10-12 RX ADMIN — Medication 500 MICROGRAM(S): at 14:16

## 2019-10-12 RX ADMIN — Medication 1 APPLICATION(S): at 05:40

## 2019-10-12 RX ADMIN — Medication 1 TABLET(S): at 05:42

## 2019-10-12 RX ADMIN — WARFARIN SODIUM 2 MILLIGRAM(S): 2.5 TABLET ORAL at 21:29

## 2019-10-12 RX ADMIN — Medication 40 MILLIGRAM(S): at 05:42

## 2019-10-12 RX ADMIN — ATORVASTATIN CALCIUM 10 MILLIGRAM(S): 80 TABLET, FILM COATED ORAL at 21:30

## 2019-10-12 RX ADMIN — TACROLIMUS 0.5 MILLIGRAM(S): 5 CAPSULE ORAL at 05:42

## 2019-10-12 RX ADMIN — Medication 25 MILLIGRAM(S): at 05:42

## 2019-10-12 RX ADMIN — Medication 180 MILLIGRAM(S): at 21:29

## 2019-10-12 RX ADMIN — TACROLIMUS 0.5 MILLIGRAM(S): 5 CAPSULE ORAL at 18:52

## 2019-10-12 RX ADMIN — HEPARIN SODIUM 5000 UNIT(S): 5000 INJECTION INTRAVENOUS; SUBCUTANEOUS at 21:31

## 2019-10-12 RX ADMIN — Medication 1: at 18:58

## 2019-10-12 RX ADMIN — Medication 25 MILLIGRAM(S): at 18:51

## 2019-10-12 RX ADMIN — Medication 325 MILLIGRAM(S): at 11:27

## 2019-10-12 RX ADMIN — Medication 1 APPLICATION(S): at 18:50

## 2019-10-12 RX ADMIN — Medication 1 SPRAY(S): at 05:42

## 2019-10-12 RX ADMIN — Medication 1 SPRAY(S): at 18:51

## 2019-10-12 RX ADMIN — HEPARIN SODIUM 5000 UNIT(S): 5000 INJECTION INTRAVENOUS; SUBCUTANEOUS at 05:42

## 2019-10-12 RX ADMIN — Medication 100 MILLIGRAM(S): at 05:42

## 2019-10-12 RX ADMIN — Medication 100 MILLIGRAM(S): at 14:12

## 2019-10-12 RX ADMIN — Medication 112 MICROGRAM(S): at 05:42

## 2019-10-12 RX ADMIN — Medication 325 MILLIGRAM(S): at 11:55

## 2019-10-12 RX ADMIN — Medication 100 GRAM(S): at 18:55

## 2019-10-12 RX ADMIN — Medication 81 MILLIGRAM(S): at 11:28

## 2019-10-12 RX ADMIN — FAMOTIDINE 20 MILLIGRAM(S): 10 INJECTION INTRAVENOUS at 18:51

## 2019-10-12 NOTE — PROGRESS NOTE ADULT - ATTENDING COMMENTS
79y Female status-post CABG x 5            POD #   10  Patient medically is cleared for discharge, but family doesn't feel comfortable about sending her to rehab  Patient explained the importance of mobilization/ambulation  Continue to advance physical activity as tolerated and continue PT/OT as directed  Cor: Continue ASA, statin, BB; will increase BB tomorrow if Diltiazem has no effect on BP within next 24 hours  A. Fib: NSR, cont warfarin 2 milliGRAM(s) for anticoagulation.  Lungs: cont duoneb; CXR with stable L pleural effusion, will not intervene at this point, room air saturation 96%  Endo: cont insulin glargine Injectable (LANTUS) 20 Unit(s) SubCutaneous every morning/insulin lispro (HumaLOG) corrective regimen sliding scale   SubCutaneous three times a day before meals/insulin lispro Injectable (HumaLOG) 4 Unit(s) SubCutaneous before breakfast. Restart home dosing on discharge.  GI: cont for liver tx tacrolimus to 0.5 milliGRAM(s) Oral every 24 hours for Tacrolimus level 7.5, therapeutic target range 1-3 as per Dr. Graves and transplant team recommendation discussed via phone conversation after faxing current lab profile. Contact info ([facility] Nurse coordinator [phone])/Fax([phone])    Social Service Disposition:  For SNF discharge

## 2019-10-12 NOTE — PROGRESS NOTE ADULT - SUBJECTIVE AND OBJECTIVE BOX
OPERATIVE PROCEDURE(s):                POD #    10 s/p CABG                   79yFemale  SURGEON(s): JUAREZ Mirza  SUBJECTIVE ASSESSMENT: Doing well, had ? traumatic fall yesterday (patient states RN not there, RN states she was there and assisted  patient to floor) I witnessed RN in Bathroom with patient that morning but cannot confirm at what point it was. Has mild left ankle, right forearm, sternal and right IJ site discomfort/ mild pain. Clinical exam unchanged today from after event yesterday. Patient continues to ambulate without new difficulty and according to PT, improving. Will continue with conservative treatment  Vital Signs Last 24 Hrs  T(F): 97.6 (12 Oct 2019 05:46), Max: 97.6 (12 Oct 2019 05:46)  HR: 74 (12 Oct 2019 14:19) (66 - 74)  BP: 156/70 (12 Oct 2019 14:19) (142/63 - 160/68)  RR: 18 (12 Oct 2019 12:39) (16 - 18)  SpO2: 96% (12 Oct 2019 08:32) (96% - 96%)    I&O's Detail    11 Oct 2019 07:  -  12 Oct 2019 07:00  --------------------------------------------------------  IN:  Total IN: 0 mL    OUT:    Voided: 250 mL  Total OUT: 250 mL    Net:   I&O's Detail    10 Oct 2019 07:  -  11 Oct 2019 07:00  --------------------------------------------------------  Total NET: -620 mL    11 Oct 2019 07:  -  12 Oct 2019 07:00  --------------------------------------------------------  Total NET: -250 mL    CAPILLARY BLOOD GLUCOSE      POCT Blood Glucose.: 205 mg/dL (12 Oct 2019 11:37)  POCT Blood Glucose.: 119 mg/dL (12 Oct 2019 07:29)  POCT Blood Glucose.: 195 mg/dL (11 Oct 2019 21:50)  POCT Blood Glucose.: 130 mg/dL (11 Oct 2019 17:41)    Physical Exam:  General: NAD; A&Ox3  Cardiac: S1/S2, RRR, no murmur, no rubs  Lungs: unlabored respirations, decreased at bases  Abdomen: Soft/NT/ND  Sternum: Intact, no click, incision healing well with no drainage  Incisions: Incisions clean/dry/intact  Extremities: No significant edema edema b/l lower extremities, ecchymotic right forearm soft and old ROM full, left ankle with arthritic changes no ecchymosis ambulates without c/o pain.        LABS:                        9.4<L>  9.01  )-----------( 258      ( 12 Oct 2019 01:15 )             29.2<L>                        10.2<L>  10.37 )-----------( 272      ( 11 Oct 2019 01:02 )             31.4<L>    1012    133<L>  |  97<L>  |  18  ----------------------------<  110<H>  4.8   |  25  |  1.2  10-11    135  |  98  |  21<H>  ----------------------------<  122<H>  5.1<H>   |  26  |  1.1    Ca    10.0      12 Oct 2019 01:15  Mg     2.1     10-12    TPro  5.7<L> [6.0 - 8.0]  /  Alb  3.6 [3.5 - 5.2]  /  TBili  0.8 [0.2 - 1.2]  /  DBili  0.2 [0.0 - 0.2]  /  AST  13 [0 - 41]  /  ALT  8 [0 - 41]  /  AlkPhos  77 [30 - 115]  10-12    PT/INR - ( 12 Oct 2019 01:15 )   PT: ;   INR: 1.85 ratio         PT/INR - ( 11 Oct 2019 01:02 )   PT: ;   INR: 2.02 ratio           Urinalysis Basic - ( 10 Oct 2019 11:19 )    Color: Yellow / Appearance: Turbid / S.013 / pH: x  Gluc: x / Ketone: Trace  / Bili: Negative / Urobili: <2 mg/dL   Blood: x / Protein: 100 mg/dL / Nitrite: Negative   Leuk Esterase: Large / RBC: 104 /HPF / WBC >720 /HPF   Sq Epi: x / Non Sq Epi: 7 /HPF / Bacteria: Many      RADIOLOGY & ADDITIONAL TESTS:  CXR: < from: Xray Chest 1 View- PORTABLE-Routine (10.11.19 @ 09:09) >  Impression:      Stable bibasilar (left greater than right) opacities and effusions.      EDICATIONS  (STANDING):  aspirin enteric coated 81 milliGRAM(s) Oral daily  atorvastatin 10 milliGRAM(s) Oral at bedtime  BACItracin   Ointment 1 Application(s) Topical two times a day  chlorhexidine 4% Liquid 1 Application(s) Topical <User Schedule>  dextrose 5%. 1000 milliLiter(s) (50 mL/Hr) IV Continuous <Continuous>  dextrose 50% Injectable 50 milliLiter(s) IV Push every 15 minutes  dextrose 50% Injectable 25 milliLiter(s) IV Push every 15 minutes  diltiazem    milliGRAM(s) Oral at bedtime  docusate sodium 100 milliGRAM(s) Oral three times a day  famotidine    Tablet 20 milliGRAM(s) Oral two times a day  fluticasone propionate 50 MICROgram(s)/spray Nasal Spray 1 Spray(s) Both Nostrils two times a day  heparin  Injectable 5000 Unit(s) SubCutaneous every 8 hours  insulin glargine Injectable (LANTUS) 20 Unit(s) SubCutaneous every morning  insulin lispro (HumaLOG) corrective regimen sliding scale   SubCutaneous three times a day before meals  insulin lispro Injectable (HumaLOG) 4 Unit(s) SubCutaneous before breakfast  ipratropium    for Nebulization 500 MICROGram(s) Nebulizer every 6 hours  levothyroxine 112 MICROGram(s) Oral daily  magnesium sulfate  IVPB 1 Gram(s) IV Intermittent every 12 hours  metoprolol tartrate 25 milliGRAM(s) Oral two times a day  polyethylene glycol 3350 17 Gram(s) Oral daily  senna 1 Tablet(s) Oral two times a day  tacrolimus 0.5 milliGRAM(s) Oral every 12 hours  trimethoprim  160 mG/sulfamethoxazole 800 mG 1 Tablet(s) Oral every 12 hours    MEDICATIONS  (PRN):  acetaminophen   Tablet .. 325 milliGRAM(s) Oral every 4 hours PRN Mild Pain (1 - 3)  dextrose 40% Gel 15 Gram(s) Oral once PRN Blood Glucose LESS THAN 70 milliGRAM(s)/deciliter  glucagon  Injectable 1 milliGRAM(s) IntraMuscular once PRN Glucose LESS THAN 70 milligrams/deciliter  zolpidem 5 milliGRAM(s) Oral at bedtime PRN Insomnia    Allergies    No Known Drug Allergies  TEGADERM (Rash)    Intolerances    Ambulation/Activity Status:    Assessment/Plan:  79y Female status-post CABG x 5            POD #   10  - Case and plan discussed with CTU Intensivist and CT Surgeon - Dr. Salazar  - Continue CTU supportive care and ongoing plan of care as per continuing CTU rounds.    - Continue DVT/GI prophylaxis  - Incentive Spirometry 10 times an hour  - Continue to advance physical activity as tolerated and continue PT/OT as directed  1. CAD: Continue ASA, statin, BB,  2. A. Fib: NSR, cont warfarin 2 milliGRAM(s) for anticoagulation.  3. COPD/Hypoxia: cont duoneb  4. DM/Glucose Control: cont insulin glargine Injectable (LANTUS) 20 Unit(s) SubCutaneous every morning/insulin lispro (HumaLOG) corrective regimen sliding scale   SubCutaneous three times a day before meals/insulin lispro Injectable (HumaLOG) 4 Unit(s) SubCutaneous before breakfast. Restart home dosing on discharge.  5. Hx of liver transplant: Continue tacrolimus to 0.5 milliGRAM(s) Oral every 24 hours for Tacrolimus level 7.5, therapeutic target range 1-3 as per Dr. Graves and transplant team recommendation discussed via phone conversation after faxing current lab profile. Contact info ([facility] Nurse coordinator [phone])/Fax([phone].  6. Hypertensive will increase BB tomorrow if Diltiazem has no effect on BP within next 24 hours  7. CXR with stable L pleural effusion, will not intervene at this point, room air saturation 96%  Social Service Disposition:  For SNF discharge

## 2019-10-13 LAB
ALBUMIN SERPL ELPH-MCNC: 4 G/DL — SIGNIFICANT CHANGE UP (ref 3.5–5.2)
ALP SERPL-CCNC: 80 U/L — SIGNIFICANT CHANGE UP (ref 30–115)
ALT FLD-CCNC: 11 U/L — SIGNIFICANT CHANGE UP (ref 0–41)
ANION GAP SERPL CALC-SCNC: 14 MMOL/L — SIGNIFICANT CHANGE UP (ref 7–14)
APTT BLD: 30.9 SEC — SIGNIFICANT CHANGE UP (ref 27–39.2)
AST SERPL-CCNC: 17 U/L — SIGNIFICANT CHANGE UP (ref 0–41)
BILIRUB DIRECT SERPL-MCNC: 0.2 MG/DL — SIGNIFICANT CHANGE UP (ref 0–0.2)
BILIRUB INDIRECT FLD-MCNC: 0.5 MG/DL — SIGNIFICANT CHANGE UP (ref 0.2–1.2)
BILIRUB SERPL-MCNC: 0.7 MG/DL — SIGNIFICANT CHANGE UP (ref 0.2–1.2)
BUN SERPL-MCNC: 15 MG/DL — SIGNIFICANT CHANGE UP (ref 10–20)
CALCIUM SERPL-MCNC: 10.1 MG/DL — SIGNIFICANT CHANGE UP (ref 8.5–10.1)
CHLORIDE SERPL-SCNC: 99 MMOL/L — SIGNIFICANT CHANGE UP (ref 98–110)
CO2 SERPL-SCNC: 23 MMOL/L — SIGNIFICANT CHANGE UP (ref 17–32)
CREAT SERPL-MCNC: 1.2 MG/DL — SIGNIFICANT CHANGE UP (ref 0.7–1.5)
GLUCOSE BLDC GLUCOMTR-MCNC: 136 MG/DL — HIGH (ref 70–99)
GLUCOSE BLDC GLUCOMTR-MCNC: 147 MG/DL — HIGH (ref 70–99)
GLUCOSE BLDC GLUCOMTR-MCNC: 174 MG/DL — HIGH (ref 70–99)
GLUCOSE BLDC GLUCOMTR-MCNC: 182 MG/DL — HIGH (ref 70–99)
GLUCOSE BLDC GLUCOMTR-MCNC: 193 MG/DL — HIGH (ref 70–99)
GLUCOSE SERPL-MCNC: 123 MG/DL — HIGH (ref 70–99)
HAV IGM SER-ACNC: SIGNIFICANT CHANGE UP
HBV CORE IGM SER-ACNC: SIGNIFICANT CHANGE UP
HBV SURFACE AG SER-ACNC: SIGNIFICANT CHANGE UP
HCT VFR BLD CALC: 30.3 % — LOW (ref 37–47)
HCV AB S/CO SERPL IA: 0.13 S/CO — SIGNIFICANT CHANGE UP (ref 0–0.99)
HCV AB SERPL-IMP: SIGNIFICANT CHANGE UP
HGB BLD-MCNC: 9.8 G/DL — LOW (ref 12–16)
INR BLD: 1.64 RATIO — HIGH (ref 0.65–1.3)
MAGNESIUM SERPL-MCNC: 2.2 MG/DL — SIGNIFICANT CHANGE UP (ref 1.8–2.4)
MCHC RBC-ENTMCNC: 29.6 PG — SIGNIFICANT CHANGE UP (ref 27–31)
MCHC RBC-ENTMCNC: 32.3 G/DL — SIGNIFICANT CHANGE UP (ref 32–37)
MCV RBC AUTO: 91.5 FL — SIGNIFICANT CHANGE UP (ref 81–99)
NRBC # BLD: 0 /100 WBCS — SIGNIFICANT CHANGE UP (ref 0–0)
PLATELET # BLD AUTO: 252 K/UL — SIGNIFICANT CHANGE UP (ref 130–400)
POTASSIUM SERPL-MCNC: 5.3 MMOL/L — HIGH (ref 3.5–5)
POTASSIUM SERPL-SCNC: 5.3 MMOL/L — HIGH (ref 3.5–5)
PROT SERPL-MCNC: 6.2 G/DL — SIGNIFICANT CHANGE UP (ref 6–8)
PROTHROM AB SERPL-ACNC: 18.8 SEC — HIGH (ref 9.95–12.87)
RBC # BLD: 3.31 M/UL — LOW (ref 4.2–5.4)
RBC # FLD: 15.1 % — HIGH (ref 11.5–14.5)
SODIUM SERPL-SCNC: 136 MMOL/L — SIGNIFICANT CHANGE UP (ref 135–146)
WBC # BLD: 9.4 K/UL — SIGNIFICANT CHANGE UP (ref 4.8–10.8)
WBC # FLD AUTO: 9.4 K/UL — SIGNIFICANT CHANGE UP (ref 4.8–10.8)

## 2019-10-13 PROCEDURE — 73600 X-RAY EXAM OF ANKLE: CPT | Mod: 26,LT

## 2019-10-13 RX ORDER — WARFARIN SODIUM 2.5 MG/1
2 TABLET ORAL ONCE
Refills: 0 | Status: COMPLETED | OUTPATIENT
Start: 2019-10-13 | End: 2019-10-13

## 2019-10-13 RX ORDER — FUROSEMIDE 40 MG
40 TABLET ORAL DAILY
Refills: 0 | Status: COMPLETED | OUTPATIENT
Start: 2019-10-13 | End: 2019-10-16

## 2019-10-13 RX ADMIN — Medication 1 SPRAY(S): at 05:55

## 2019-10-13 RX ADMIN — Medication 112 MICROGRAM(S): at 05:51

## 2019-10-13 RX ADMIN — HEPARIN SODIUM 5000 UNIT(S): 5000 INJECTION INTRAVENOUS; SUBCUTANEOUS at 13:40

## 2019-10-13 RX ADMIN — TACROLIMUS 0.5 MILLIGRAM(S): 5 CAPSULE ORAL at 18:49

## 2019-10-13 RX ADMIN — FAMOTIDINE 20 MILLIGRAM(S): 10 INJECTION INTRAVENOUS at 18:48

## 2019-10-13 RX ADMIN — Medication 1: at 18:46

## 2019-10-13 RX ADMIN — Medication 100 MILLIGRAM(S): at 13:40

## 2019-10-13 RX ADMIN — Medication 1 TABLET(S): at 18:49

## 2019-10-13 RX ADMIN — HEPARIN SODIUM 5000 UNIT(S): 5000 INJECTION INTRAVENOUS; SUBCUTANEOUS at 05:52

## 2019-10-13 RX ADMIN — FAMOTIDINE 20 MILLIGRAM(S): 10 INJECTION INTRAVENOUS at 05:51

## 2019-10-13 RX ADMIN — Medication 180 MILLIGRAM(S): at 21:15

## 2019-10-13 RX ADMIN — Medication 100 GRAM(S): at 18:48

## 2019-10-13 RX ADMIN — ZOLPIDEM TARTRATE 5 MILLIGRAM(S): 10 TABLET ORAL at 23:49

## 2019-10-13 RX ADMIN — Medication 4 UNIT(S): at 07:57

## 2019-10-13 RX ADMIN — Medication 500 MICROGRAM(S): at 07:49

## 2019-10-13 RX ADMIN — Medication 25 MILLIGRAM(S): at 05:51

## 2019-10-13 RX ADMIN — Medication 1 APPLICATION(S): at 18:46

## 2019-10-13 RX ADMIN — Medication 25 MILLIGRAM(S): at 18:47

## 2019-10-13 RX ADMIN — TACROLIMUS 0.5 MILLIGRAM(S): 5 CAPSULE ORAL at 05:53

## 2019-10-13 RX ADMIN — Medication 500 MICROGRAM(S): at 14:01

## 2019-10-13 RX ADMIN — ATORVASTATIN CALCIUM 10 MILLIGRAM(S): 80 TABLET, FILM COATED ORAL at 21:15

## 2019-10-13 RX ADMIN — Medication 1 APPLICATION(S): at 05:53

## 2019-10-13 RX ADMIN — Medication 1 TABLET(S): at 05:51

## 2019-10-13 RX ADMIN — HEPARIN SODIUM 5000 UNIT(S): 5000 INJECTION INTRAVENOUS; SUBCUTANEOUS at 21:15

## 2019-10-13 RX ADMIN — Medication 40 MILLIGRAM(S): at 10:42

## 2019-10-13 RX ADMIN — Medication 500 MICROGRAM(S): at 20:10

## 2019-10-13 RX ADMIN — WARFARIN SODIUM 2 MILLIGRAM(S): 2.5 TABLET ORAL at 21:15

## 2019-10-13 RX ADMIN — Medication 100 GRAM(S): at 05:48

## 2019-10-13 RX ADMIN — Medication 81 MILLIGRAM(S): at 11:46

## 2019-10-13 NOTE — PROGRESS NOTE ADULT - SUBJECTIVE AND OBJECTIVE BOX
OPERATIVE PROCEDURE(s):                POD #     11 s/p CABG                  79yFemale  SURGEON(s): JUAREZ Mirza  SUBJECTIVE ASSESSMENT: progressing well, gets dyspnea the longer she walks, PT continues to improve, did 3 steps today. c/o left sided chest pain that occurs with activity and when lying back (reproducible (stretching sternal incision. Reviewed correct way to use incentive. feels ok because not getting better fast enough. Seen by Dr. Salazar today.  Vital Signs Last 24 Hrs  T(F): 97 (13 Oct 2019 05:35), Max: 97 (13 Oct 2019 05:35)  HR: 65 (13 Oct 2019 10:44) (62 - 74)  BP: 130/62 (13 Oct 2019 10:44) (130/62 - 160/68)  RR: 18 (13 Oct 2019 05:35) (16 - 18)    I&O's Detail    12 Oct 2019 07:01  -  13 Oct 2019 07:00  --------------------------------------------------------  IN:  Total IN: 0 mL    OUT:    Voided: 400 mL  Total OUT: 400 mL    Net:   I&O's Detail    11 Oct 2019 07:01  -  12 Oct 2019 07:00  --------------------------------------------------------  Total NET: -250 mL      12 Oct 2019 07:01  -  13 Oct 2019 07:00  --------------------------------------------------------  Total NET: -400 mL    CAPILLARY BLOOD GLUCOSE      POCT Blood Glucose.: 136 mg/dL (13 Oct 2019 11:34)  POCT Blood Glucose.: 147 mg/dL (13 Oct 2019 07:26)  POCT Blood Glucose.: 92 mg/dL (12 Oct 2019 21:52)  POCT Blood Glucose.: 196 mg/dL (12 Oct 2019 18:48)  POCT Blood Glucose.: 173 mg/dL (12 Oct 2019 16:42)    Physical Exam:  General: NAD; A&Ox3  Cardiac: S1/S2, RRR, no murmur, no rubs  Lungs: unlabored respirations, rales left side and bs decreased at bases L>R  Abdomen: Soft/NT/ND;   Sternum: Intact, no click, incision healing well with no drainage  Incisions: Incisions clean/dry/intact  Extremities: slight edema b/l lower extremities; Left ankle with arthritic changes, no point tenderness or ecchymosis no pain with dorsi and plantar flexion       LABS:                        9.8<L>  9.40  )-----------( 252      ( 13 Oct 2019 01:26 )             30.3<L>                        9.4<L>  9.01  )-----------( 258      ( 12 Oct 2019 01:15 )             29.2<L>    10-12    133<L>  |  97<L>  |  18  ----------------------------<  110<H>  4.8   |  25  |  1.2  10-11    135  |  98  |  21<H>  ----------------------------<  122<H>  5.1<H>   |  26  |  1.1    Ca    10.0      12 Oct 2019 01:15  Mg     2.2     10-13    TPro  6.2 [6.0 - 8.0]  /  Alb  4.0 [3.5 - 5.2]  /  TBili  0.7 [0.2 - 1.2]  /  DBili  0.2 [0.0 - 0.2]  /  AST  17 [0 - 41]  /  ALT  11 [0 - 41]  /  AlkPhos  80 [30 - 115]  10-13    PT/INR - ( 12 Oct 2019 01:15 )   PT: ;   INR: 1.85 ratio      RADIOLOGY & ADDITIONAL TESTS:  CXR: < from: Xray Chest 1 View- PORTABLE-Routine (10.12.19 @ 09:08) >  Impression:      Stable to decreased bibasilar opacities.    < end of copied text >    MEDICATIONS  (STANDING):  aspirin enteric coated 81 milliGRAM(s) Oral daily  atorvastatin 10 milliGRAM(s) Oral at bedtime  BACItracin   Ointment 1 Application(s) Topical two times a day  chlorhexidine 4% Liquid 1 Application(s) Topical <User Schedule>  dextrose 5%. 1000 milliLiter(s) (50 mL/Hr) IV Continuous <Continuous>  dextrose 50% Injectable 50 milliLiter(s) IV Push every 15 minutes  dextrose 50% Injectable 25 milliLiter(s) IV Push every 15 minutes  diltiazem    milliGRAM(s) Oral at bedtime  docusate sodium 100 milliGRAM(s) Oral three times a day  famotidine    Tablet 20 milliGRAM(s) Oral two times a day  fluticasone propionate 50 MICROgram(s)/spray Nasal Spray 1 Spray(s) Both Nostrils two times a day  furosemide   Injectable 40 milliGRAM(s) IV Push daily  heparin  Injectable 5000 Unit(s) SubCutaneous every 8 hours  insulin glargine Injectable (LANTUS) 20 Unit(s) SubCutaneous every morning  insulin lispro (HumaLOG) corrective regimen sliding scale   SubCutaneous three times a day before meals  insulin lispro Injectable (HumaLOG) 4 Unit(s) SubCutaneous before breakfast  ipratropium    for Nebulization 500 MICROGram(s) Nebulizer every 6 hours  levothyroxine 112 MICROGram(s) Oral daily  magnesium sulfate  IVPB 1 Gram(s) IV Intermittent every 12 hours  metoprolol tartrate 25 milliGRAM(s) Oral two times a day  polyethylene glycol 3350 17 Gram(s) Oral daily  senna 1 Tablet(s) Oral two times a day  tacrolimus 0.5 milliGRAM(s) Oral every 12 hours  trimethoprim  160 mG/sulfamethoxazole 800 mG 1 Tablet(s) Oral every 12 hours    MEDICATIONS  (PRN):  acetaminophen   Tablet .. 325 milliGRAM(s) Oral every 4 hours PRN Mild Pain (1 - 3)  dextrose 40% Gel 15 Gram(s) Oral once PRN Blood Glucose LESS THAN 70 milliGRAM(s)/deciliter  glucagon  Injectable 1 milliGRAM(s) IntraMuscular once PRN Glucose LESS THAN 70 milligrams/deciliter  zolpidem 5 milliGRAM(s) Oral at bedtime PRN Insomnia    Allergies    No Known Drug Allergies  TEGADERM (Rash)    Intolerances      Ambulation/Activity Status:    79y Female status-post CABG x 5            POD #   11  - Case and plan discussed with CTU Intensivist and CT Surgeon - Dr. Salazar  - Continue CTU supportive care and ongoing plan of care as per continuing CTU rounds.    - Continue DVT/GI prophylaxis add SCD's  - Incentive Spirometry 10 times an hour  - Continue to advance physical activity as tolerated and continue PT/OT as directed  1. CAD: Continue ASA, statin, BB,  2. A. Fib: NSR, cont warfarin 1 milliGRAM(s) for anticoagulation discuss tomorrow if needed due to high risk of fall  3. COPD/Hypoxia: cont duoneb  4. DM/Glucose Control: cont insulin glargine Injectable (LANTUS) 20 Unit(s) SubCutaneous every morning/insulin lispro (HumaLOG) corrective regimen sliding scale   SubCutaneous three times a day before meals/insulin lispro Injectable (HumaLOG) 4 Unit(s) SubCutaneous before breakfast. Restart home dosing on discharge.  5. Hx of liver transplant: Continue tacrolimus to 0.5 milliGRAM(s) Oral every 24 hours for Tacrolimus level 7.5, therapeutic target range 1-3 as per Dr. Graves and transplant team recommendation discussed via phone conversation after faxing current lab profile. Contact info ([facility] Nurse coordinator [phone])/Fax([phone]. w  6. No need to increase BB as BP controlled with diltizam and current BB dose  7. CXR with stable L pleural effusion, will diuresis for rales on left side  8. xray  left ankle due to continued soreness, feel primary discomfort related to arthritis  Social Service Disposition:  For SNF discharge

## 2019-10-13 NOTE — PROGRESS NOTE ADULT - ATTENDING COMMENTS
79y Female status-post CABG x 5            POD #   11  Patient medically is cleared for discharge, but family doesn't feel comfortable about sending her to rehab  Patient explained the importance of mobilization/ambulation  Continue to advance physical activity as tolerated and continue PT/OT as directed  Cor: Continue ASA, statin, BB; will increase BB tomorrow if Diltiazem has no effect on BP within next 24 hours  A. Fib: NSR, cont warfarin 1 milliGRAM(s) for anticoagulation.  Lungs: cont duoneb; CXR with stable L pleural effusion, will not intervene at this point, room air saturation 96%  Endo: cont insulin glargine Injectable (LANTUS) 20 Unit(s) SubCutaneous every morning/insulin lispro (HumaLOG) corrective regimen sliding scale   SubCutaneous three times a day before meals/insulin lispro Injectable (HumaLOG) 4 Unit(s) SubCutaneous before breakfast. Restart home dosing on discharge.  GI: cont for liver tx tacrolimus to 0.5 milliGRAM(s) Oral every 24 hours for Tacrolimus level 7.5, therapeutic target range 1-3 as per Dr. Graves and transplant team recommendation discussed via phone conversation after faxing current lab profile. Contact info ([facility] Nurse coordinator [phone])/Fax([phone])    xray  left ankle due to continued soreness, feel primary discomfort related to arthritis  Social Service Disposition:  For SNF discharge

## 2019-10-13 NOTE — PROGRESS NOTE ADULT - NSHPATTENDINGPLANDISCUSS_GEN_ALL_CORE
CTU team
d/w family and patient and CT surg PA
d/w family and patient and CT surg PA

## 2019-10-14 LAB
ANION GAP SERPL CALC-SCNC: 11 MMOL/L — SIGNIFICANT CHANGE UP (ref 7–14)
BUN SERPL-MCNC: 20 MG/DL — SIGNIFICANT CHANGE UP (ref 10–20)
CALCIUM SERPL-MCNC: 10.4 MG/DL — HIGH (ref 8.5–10.1)
CHLORIDE SERPL-SCNC: 95 MMOL/L — LOW (ref 98–110)
CO2 SERPL-SCNC: 28 MMOL/L — SIGNIFICANT CHANGE UP (ref 17–32)
CREAT SERPL-MCNC: 1.2 MG/DL — SIGNIFICANT CHANGE UP (ref 0.7–1.5)
GLUCOSE BLDC GLUCOMTR-MCNC: 152 MG/DL — HIGH (ref 70–99)
GLUCOSE BLDC GLUCOMTR-MCNC: 154 MG/DL — HIGH (ref 70–99)
GLUCOSE BLDC GLUCOMTR-MCNC: 172 MG/DL — HIGH (ref 70–99)
GLUCOSE BLDC GLUCOMTR-MCNC: 205 MG/DL — HIGH (ref 70–99)
GLUCOSE BLDC GLUCOMTR-MCNC: 98 MG/DL — SIGNIFICANT CHANGE UP (ref 70–99)
GLUCOSE SERPL-MCNC: 96 MG/DL — SIGNIFICANT CHANGE UP (ref 70–99)
INR BLD: 1.8 RATIO — HIGH (ref 0.65–1.3)
POTASSIUM SERPL-MCNC: 5.7 MMOL/L — HIGH (ref 3.5–5)
POTASSIUM SERPL-SCNC: 5.7 MMOL/L — HIGH (ref 3.5–5)
PROTHROM AB SERPL-ACNC: 20.6 SEC — HIGH (ref 9.95–12.87)
SODIUM SERPL-SCNC: 134 MMOL/L — LOW (ref 135–146)

## 2019-10-14 PROCEDURE — 71045 X-RAY EXAM CHEST 1 VIEW: CPT | Mod: 26

## 2019-10-14 RX ORDER — FUROSEMIDE 40 MG
40 TABLET ORAL ONCE
Refills: 0 | Status: COMPLETED | OUTPATIENT
Start: 2019-10-14 | End: 2019-10-14

## 2019-10-14 RX ORDER — ASPIRIN/CALCIUM CARB/MAGNESIUM 324 MG
325 TABLET ORAL DAILY
Refills: 0 | Status: DISCONTINUED | OUTPATIENT
Start: 2019-10-14 | End: 2019-10-18

## 2019-10-14 RX ADMIN — Medication 100 MILLIGRAM(S): at 15:50

## 2019-10-14 RX ADMIN — Medication 40 MILLIGRAM(S): at 06:37

## 2019-10-14 RX ADMIN — Medication 500 MICROGRAM(S): at 08:07

## 2019-10-14 RX ADMIN — Medication 600 MILLIGRAM(S): at 10:54

## 2019-10-14 RX ADMIN — Medication 100 MILLIGRAM(S): at 21:32

## 2019-10-14 RX ADMIN — Medication 1 TABLET(S): at 06:35

## 2019-10-14 RX ADMIN — Medication 100 GRAM(S): at 18:15

## 2019-10-14 RX ADMIN — Medication 1 TABLET(S): at 18:17

## 2019-10-14 RX ADMIN — FAMOTIDINE 20 MILLIGRAM(S): 10 INJECTION INTRAVENOUS at 06:35

## 2019-10-14 RX ADMIN — HEPARIN SODIUM 5000 UNIT(S): 5000 INJECTION INTRAVENOUS; SUBCUTANEOUS at 15:51

## 2019-10-14 RX ADMIN — HEPARIN SODIUM 5000 UNIT(S): 5000 INJECTION INTRAVENOUS; SUBCUTANEOUS at 06:37

## 2019-10-14 RX ADMIN — Medication 25 MILLIGRAM(S): at 18:16

## 2019-10-14 RX ADMIN — Medication 500 MICROGRAM(S): at 13:03

## 2019-10-14 RX ADMIN — ZOLPIDEM TARTRATE 5 MILLIGRAM(S): 10 TABLET ORAL at 23:39

## 2019-10-14 RX ADMIN — Medication 1 SPRAY(S): at 18:17

## 2019-10-14 RX ADMIN — ATORVASTATIN CALCIUM 10 MILLIGRAM(S): 80 TABLET, FILM COATED ORAL at 21:32

## 2019-10-14 RX ADMIN — SENNA PLUS 1 TABLET(S): 8.6 TABLET ORAL at 18:16

## 2019-10-14 RX ADMIN — Medication 2: at 08:34

## 2019-10-14 RX ADMIN — FAMOTIDINE 20 MILLIGRAM(S): 10 INJECTION INTRAVENOUS at 18:16

## 2019-10-14 RX ADMIN — Medication 500 MICROGRAM(S): at 19:45

## 2019-10-14 RX ADMIN — HEPARIN SODIUM 5000 UNIT(S): 5000 INJECTION INTRAVENOUS; SUBCUTANEOUS at 21:32

## 2019-10-14 RX ADMIN — Medication 4 UNIT(S): at 08:34

## 2019-10-14 RX ADMIN — Medication 600 MILLIGRAM(S): at 18:15

## 2019-10-14 RX ADMIN — Medication 112 MICROGRAM(S): at 06:35

## 2019-10-14 RX ADMIN — TACROLIMUS 0.5 MILLIGRAM(S): 5 CAPSULE ORAL at 06:46

## 2019-10-14 RX ADMIN — Medication 1: at 18:19

## 2019-10-14 RX ADMIN — Medication 325 MILLIGRAM(S): at 11:26

## 2019-10-14 RX ADMIN — Medication 100 GRAM(S): at 06:37

## 2019-10-14 RX ADMIN — Medication 180 MILLIGRAM(S): at 21:32

## 2019-10-14 RX ADMIN — INSULIN GLARGINE 20 UNIT(S): 100 INJECTION, SOLUTION SUBCUTANEOUS at 11:23

## 2019-10-14 RX ADMIN — TACROLIMUS 0.5 MILLIGRAM(S): 5 CAPSULE ORAL at 18:19

## 2019-10-14 RX ADMIN — Medication 1 APPLICATION(S): at 06:37

## 2019-10-14 RX ADMIN — Medication 1 APPLICATION(S): at 18:17

## 2019-10-14 RX ADMIN — Medication 25 MILLIGRAM(S): at 06:36

## 2019-10-14 RX ADMIN — Medication 40 MILLIGRAM(S): at 19:06

## 2019-10-15 LAB
ANION GAP SERPL CALC-SCNC: 11 MMOL/L — SIGNIFICANT CHANGE UP (ref 7–14)
ANION GAP SERPL CALC-SCNC: 16 MMOL/L — HIGH (ref 7–14)
BUN SERPL-MCNC: 22 MG/DL — HIGH (ref 10–20)
BUN SERPL-MCNC: 24 MG/DL — HIGH (ref 10–20)
CALCIUM SERPL-MCNC: 10 MG/DL — SIGNIFICANT CHANGE UP (ref 8.5–10.1)
CALCIUM SERPL-MCNC: 9.8 MG/DL — SIGNIFICANT CHANGE UP (ref 8.5–10.1)
CHLORIDE SERPL-SCNC: 91 MMOL/L — LOW (ref 98–110)
CHLORIDE SERPL-SCNC: 94 MMOL/L — LOW (ref 98–110)
CO2 SERPL-SCNC: 20 MMOL/L — SIGNIFICANT CHANGE UP (ref 17–32)
CO2 SERPL-SCNC: 26 MMOL/L — SIGNIFICANT CHANGE UP (ref 17–32)
CREAT SERPL-MCNC: 1.1 MG/DL — SIGNIFICANT CHANGE UP (ref 0.7–1.5)
CREAT SERPL-MCNC: 1.2 MG/DL — SIGNIFICANT CHANGE UP (ref 0.7–1.5)
GLUCOSE BLDC GLUCOMTR-MCNC: 157 MG/DL — HIGH (ref 70–99)
GLUCOSE BLDC GLUCOMTR-MCNC: 166 MG/DL — HIGH (ref 70–99)
GLUCOSE BLDC GLUCOMTR-MCNC: 167 MG/DL — HIGH (ref 70–99)
GLUCOSE SERPL-MCNC: 174 MG/DL — HIGH (ref 70–99)
GLUCOSE SERPL-MCNC: 176 MG/DL — HIGH (ref 70–99)
HCT VFR BLD CALC: 33.1 % — LOW (ref 37–47)
HGB BLD-MCNC: 10.8 G/DL — LOW (ref 12–16)
INR BLD: 1.65 RATIO — HIGH (ref 0.65–1.3)
MCHC RBC-ENTMCNC: 29.3 PG — SIGNIFICANT CHANGE UP (ref 27–31)
MCHC RBC-ENTMCNC: 32.6 G/DL — SIGNIFICANT CHANGE UP (ref 32–37)
MCV RBC AUTO: 89.9 FL — SIGNIFICANT CHANGE UP (ref 81–99)
NRBC # BLD: 0 /100 WBCS — SIGNIFICANT CHANGE UP (ref 0–0)
PLATELET # BLD AUTO: 181 K/UL — SIGNIFICANT CHANGE UP (ref 130–400)
POTASSIUM SERPL-MCNC: 4.6 MMOL/L — SIGNIFICANT CHANGE UP (ref 3.5–5)
POTASSIUM SERPL-MCNC: 5.3 MMOL/L — HIGH (ref 3.5–5)
POTASSIUM SERPL-SCNC: 4.6 MMOL/L — SIGNIFICANT CHANGE UP (ref 3.5–5)
POTASSIUM SERPL-SCNC: 5.3 MMOL/L — HIGH (ref 3.5–5)
PROTHROM AB SERPL-ACNC: 18.9 SEC — HIGH (ref 9.95–12.87)
RBC # BLD: 3.68 M/UL — LOW (ref 4.2–5.4)
RBC # FLD: 15.1 % — HIGH (ref 11.5–14.5)
SODIUM SERPL-SCNC: 128 MMOL/L — LOW (ref 135–146)
SODIUM SERPL-SCNC: 130 MMOL/L — LOW (ref 135–146)
TACROLIMUS SERPL-MCNC: 7.9 NG/ML — SIGNIFICANT CHANGE UP
WBC # BLD: 8.81 K/UL — SIGNIFICANT CHANGE UP (ref 4.8–10.8)
WBC # FLD AUTO: 8.81 K/UL — SIGNIFICANT CHANGE UP (ref 4.8–10.8)

## 2019-10-15 RX ORDER — TACROLIMUS 5 MG/1
0.5 CAPSULE ORAL DAILY
Refills: 0 | Status: DISCONTINUED | OUTPATIENT
Start: 2019-10-16 | End: 2019-10-18

## 2019-10-15 RX ORDER — SODIUM CHLORIDE 9 MG/ML
1 INJECTION INTRAMUSCULAR; INTRAVENOUS; SUBCUTANEOUS EVERY 8 HOURS
Refills: 0 | Status: COMPLETED | OUTPATIENT
Start: 2019-10-15 | End: 2019-10-16

## 2019-10-15 RX ADMIN — Medication 1 SPRAY(S): at 18:09

## 2019-10-15 RX ADMIN — Medication 500 MICROGRAM(S): at 20:05

## 2019-10-15 RX ADMIN — Medication 325 MILLIGRAM(S): at 12:50

## 2019-10-15 RX ADMIN — HEPARIN SODIUM 5000 UNIT(S): 5000 INJECTION INTRAVENOUS; SUBCUTANEOUS at 13:38

## 2019-10-15 RX ADMIN — Medication 100 GRAM(S): at 05:36

## 2019-10-15 RX ADMIN — Medication 40 MILLIGRAM(S): at 05:37

## 2019-10-15 RX ADMIN — Medication 1 TABLET(S): at 18:12

## 2019-10-15 RX ADMIN — Medication 325 MILLIGRAM(S): at 23:32

## 2019-10-15 RX ADMIN — CHLORHEXIDINE GLUCONATE 1 APPLICATION(S): 213 SOLUTION TOPICAL at 05:38

## 2019-10-15 RX ADMIN — ZOLPIDEM TARTRATE 5 MILLIGRAM(S): 10 TABLET ORAL at 22:12

## 2019-10-15 RX ADMIN — Medication 100 MILLIGRAM(S): at 05:37

## 2019-10-15 RX ADMIN — FAMOTIDINE 20 MILLIGRAM(S): 10 INJECTION INTRAVENOUS at 18:09

## 2019-10-15 RX ADMIN — Medication 325 MILLIGRAM(S): at 22:13

## 2019-10-15 RX ADMIN — Medication 1: at 17:45

## 2019-10-15 RX ADMIN — TACROLIMUS 0.5 MILLIGRAM(S): 5 CAPSULE ORAL at 05:36

## 2019-10-15 RX ADMIN — SODIUM CHLORIDE 1 GRAM(S): 9 INJECTION INTRAMUSCULAR; INTRAVENOUS; SUBCUTANEOUS at 18:12

## 2019-10-15 RX ADMIN — ATORVASTATIN CALCIUM 10 MILLIGRAM(S): 80 TABLET, FILM COATED ORAL at 22:12

## 2019-10-15 RX ADMIN — SENNA PLUS 1 TABLET(S): 8.6 TABLET ORAL at 05:37

## 2019-10-15 RX ADMIN — Medication 1 SPRAY(S): at 05:36

## 2019-10-15 RX ADMIN — INSULIN GLARGINE 20 UNIT(S): 100 INJECTION, SOLUTION SUBCUTANEOUS at 08:14

## 2019-10-15 RX ADMIN — Medication 600 MILLIGRAM(S): at 05:37

## 2019-10-15 RX ADMIN — Medication 100 MILLIGRAM(S): at 13:38

## 2019-10-15 RX ADMIN — Medication 112 MICROGRAM(S): at 05:37

## 2019-10-15 RX ADMIN — Medication 1 APPLICATION(S): at 05:36

## 2019-10-15 RX ADMIN — Medication 1 APPLICATION(S): at 18:09

## 2019-10-15 RX ADMIN — Medication 100 GRAM(S): at 18:10

## 2019-10-15 RX ADMIN — Medication 1: at 08:12

## 2019-10-15 RX ADMIN — Medication 25 MILLIGRAM(S): at 18:10

## 2019-10-15 RX ADMIN — Medication 180 MILLIGRAM(S): at 22:12

## 2019-10-15 RX ADMIN — Medication 1 TABLET(S): at 05:36

## 2019-10-15 RX ADMIN — Medication 100 MILLIGRAM(S): at 22:12

## 2019-10-15 RX ADMIN — Medication 4 UNIT(S): at 08:11

## 2019-10-15 RX ADMIN — SENNA PLUS 1 TABLET(S): 8.6 TABLET ORAL at 18:12

## 2019-10-15 RX ADMIN — POLYETHYLENE GLYCOL 3350 17 GRAM(S): 17 POWDER, FOR SOLUTION ORAL at 12:51

## 2019-10-15 RX ADMIN — Medication 600 MILLIGRAM(S): at 18:12

## 2019-10-15 RX ADMIN — Medication 25 MILLIGRAM(S): at 05:37

## 2019-10-15 RX ADMIN — HEPARIN SODIUM 5000 UNIT(S): 5000 INJECTION INTRAVENOUS; SUBCUTANEOUS at 05:37

## 2019-10-15 RX ADMIN — SODIUM CHLORIDE 1 GRAM(S): 9 INJECTION INTRAMUSCULAR; INTRAVENOUS; SUBCUTANEOUS at 22:12

## 2019-10-15 RX ADMIN — FAMOTIDINE 20 MILLIGRAM(S): 10 INJECTION INTRAVENOUS at 05:37

## 2019-10-15 RX ADMIN — HEPARIN SODIUM 5000 UNIT(S): 5000 INJECTION INTRAVENOUS; SUBCUTANEOUS at 22:12

## 2019-10-15 RX ADMIN — Medication 500 MICROGRAM(S): at 07:57

## 2019-10-15 NOTE — PROGRESS NOTE ADULT - SUBJECTIVE AND OBJECTIVE BOX
OPERATIVE PROCEDURE(s):   CABGx5             POD #   13                    SURGEON(s): JUAREZ Mirza  SUBJECTIVE ASSESSMENT: 79y Female patient seen and examined at bedside. Patient denies any acute complaints at this time.     Vital Signs Last 24 Hrs  T(F): 97.1 (15 Oct 2019 13:00), Max: 97.4 (14 Oct 2019 20:20)  HR: 64 (15 Oct 2019 13:00) (64 - 76)  BP: 133/56 (15 Oct 2019 13:00) (130/60 - 145/70)  RR: 18 (15 Oct 2019 13:00) (18 - 18)  SpO2: 98% (15 Oct 2019 14:47) (98% - 98%)    I&O's Detail    14 Oct 2019 07:01  -  15 Oct 2019 07:00  --------------------------------------------------------  IN:    IV PiggyBack: 100 mL  Total IN: 100 mL    OUT:    Voided: 1550 mL  Total OUT: 1550 mL    Net: I&O's Detail    13 Oct 2019 07:01  -  14 Oct 2019 07:00  --------------------------------------------------------  Total NET: -200 mL    14 Oct 2019 07:01  -  15 Oct 2019 07:00  --------------------------------------------------------  Total NET: -1450 mL      CAPILLARY BLOOD GLUCOSE  POCT Blood Glucose.: 167 mg/dL (15 Oct 2019 16:49)  POCT Blood Glucose.: 157 mg/dL (15 Oct 2019 07:26)  POCT Blood Glucose.: 172 mg/dL (14 Oct 2019 21:39)  POCT Blood Glucose.: 154 mg/dL (14 Oct 2019 18:13)      Physical Exam:  General: NAD; A&Ox3  Cardiac: S1/S2, RRR, no murmur, no rubs  Lungs: unlabored respirations, CTA b/l, no wheeze, no rales, no crackles  Abdomen: Soft/NT/ND; positive bowel sounds x 4  Sternum: Intact, no click, incision healing well with no drainage  Incisions: Incisions clean/dry/intact  Extremities: trace edema b/l lower extremities; good capillary refill; no cyanosis; palpable 1+ pedal pulses b/l    LABS:                        10.8<L>  8.81  )-----------( 181      ( 15 Oct 2019 06:43 )             33.1<L>                        9.8<L>  9.40  )-----------( 252      ( 13 Oct 2019 01:26 )             30.3<L>    10-15    130<L>  |  94<L>  |  22<H>  ----------------------------<  176<H>  5.3<H>   |  20  |  1.2    10-15    128<L>  |  91<L>  |  24<H>  ----------------------------<  174<H>  4.6   |  26  |  1.1    Ca    10.0      15 Oct 2019 06:43  Mg     2.2     10-13    TPro  6.2 [6.0 - 8.0]  /  Alb  4.0 [3.5 - 5.2]  /  TBili  0.7 [0.2 - 1.2]  /  DBili  0.2 [0.0 - 0.2]  /  AST  17 [0 - 41]  /  ALT  11 [0 - 41]  /  AlkPhos  80 [30 - 115]  10-13    PT/INR - ( 15 Oct 2019 11:24 )   PT: ;   INR: 1.65 ratio       PT/INR - ( 14 Oct 2019 11:36 )   PT: ;   INR: 1.80 ratio           RADIOLOGY & ADDITIONAL TESTS:  CXR: < from: Xray Chest 1 View- PORTABLE-Routine (10.14.19 @ 09:08) >  IMPRESSION: Unchanged retrocardiac opacity.  < end of copied text >    EKG: < from: 12 Lead ECG (10.11.19 @ 07:36) >  Ventricular Rate 75 BPM  Atrial Rate 75 BPM  P-R Interval 174 ms  QRS Duration 74 ms  Q-T Interval 336 ms  QTC Calculation(Bezet) 375 ms  P Axis -23 degrees  R Axis 22 degrees  T Axis 227 degrees  Diagnosis Line Atrial-paced rhythm  ST & T wave abnormality, consider inferolateral ischemia  Abnormal ECG  Confirmed by RAKESH RAGSDALE MD ([id number]) on 10/11/2019 8:48:14 AM  < end of copied text >      Allergies  No Known Drug Allergies  TEGADERM (Rash)  Intolerances      MEDICATIONS  (STANDING):  aspirin enteric coated 325 milliGRAM(s) Oral daily  atorvastatin 10 milliGRAM(s) Oral at bedtime  BACItracin   Ointment 1 Application(s) Topical two times a day  chlorhexidine 4% Liquid 1 Application(s) Topical <User Schedule>  dextrose 5%. 1000 milliLiter(s) (50 mL/Hr) IV Continuous <Continuous>  dextrose 50% Injectable 50 milliLiter(s) IV Push every 15 minutes  dextrose 50% Injectable 25 milliLiter(s) IV Push every 15 minutes  diltiazem    milliGRAM(s) Oral at bedtime  docusate sodium 100 milliGRAM(s) Oral three times a day  famotidine    Tablet 20 milliGRAM(s) Oral two times a day  fluticasone propionate 50 MICROgram(s)/spray Nasal Spray 1 Spray(s) Both Nostrils two times a day  furosemide   Injectable 40 milliGRAM(s) IV Push daily  guaiFENesin  milliGRAM(s) Oral every 12 hours  heparin  Injectable 5000 Unit(s) SubCutaneous every 8 hours  insulin glargine Injectable (LANTUS) 20 Unit(s) SubCutaneous every morning  insulin lispro (HumaLOG) corrective regimen sliding scale   SubCutaneous three times a day before meals  insulin lispro Injectable (HumaLOG) 4 Unit(s) SubCutaneous before breakfast  ipratropium    for Nebulization 500 MICROGram(s) Nebulizer every 6 hours  levothyroxine 112 MICROGram(s) Oral daily  magnesium sulfate  IVPB 1 Gram(s) IV Intermittent every 12 hours  metoprolol tartrate 25 milliGRAM(s) Oral two times a day  polyethylene glycol 3350 17 Gram(s) Oral daily  senna 1 Tablet(s) Oral two times a day  sodium chloride 1 Gram(s) Oral every 8 hours  trimethoprim  160 mG/sulfamethoxazole 800 mG 1 Tablet(s) Oral every 12 hours    MEDICATIONS  (PRN):  acetaminophen   Tablet .. 325 milliGRAM(s) Oral every 4 hours PRN Mild Pain (1 - 3)  dextrose 40% Gel 15 Gram(s) Oral once PRN Blood Glucose LESS THAN 70 milliGRAM(s)/deciliter  glucagon  Injectable 1 milliGRAM(s) IntraMuscular once PRN Glucose LESS THAN 70 milligrams/deciliter  zolpidem 5 milliGRAM(s) Oral at bedtime PRN Insomnia      Post-Op Beta-Blockers: [x]Yes, []No: contraindication:  Post-Op Aspirin: [x]Yes,  []No: contraindication:  Post-Op Statin: [x]Yes, []No: contraindication:      Ambulation/Activity Status: OOB/AMB    Assessment/Plan:  79y Female status-post CABG x 5            POD #   13  - Case and plan discussed with CTU Intensivist and CT Surgeon - Dr. Mirza  - Continue CTU supportive care and ongoing plan of care as per continuing CTU rounds.    - Continue DVT/GI prophylaxis add SCD's  - Incentive Spirometry 10 times an hour  - Continue to advance physical activity as tolerated and continue PT/OT as directed  1. CAD: Continue ASA, statin, BB,  2. A. Fib: NSR for past few days, no coumadin due to s.p fall and increased fall risk.   3. COPD/Hypoxia: cont duoneb  4. DM/Glucose Control: cont lantus 20, humalog 4, with sliding scale. Restart home dosing on discharge.  5. Hx of liver transplant: Continue tacrolimus to 0.5 milliGRAM(s) Oral every 24 hours for Tacrolimus level 7.5, therapeutic target range 1-3 as per Dr. Graves and transplant team recommendation discussed via phone conversation after faxing current lab profile. Contact info ([facility] Nurse coordinator [phone])/Fax([phone]. w  6. CXR with stable L pleural effusion, will diuresis for rales on left side

## 2019-10-16 LAB
ALBUMIN SERPL ELPH-MCNC: 4.1 G/DL — SIGNIFICANT CHANGE UP (ref 3.5–5.2)
ALP SERPL-CCNC: 77 U/L — SIGNIFICANT CHANGE UP (ref 30–115)
ALT FLD-CCNC: 10 U/L — SIGNIFICANT CHANGE UP (ref 0–41)
ANION GAP SERPL CALC-SCNC: 13 MMOL/L — SIGNIFICANT CHANGE UP (ref 7–14)
AST SERPL-CCNC: 14 U/L — SIGNIFICANT CHANGE UP (ref 0–41)
BILIRUB SERPL-MCNC: 0.6 MG/DL — SIGNIFICANT CHANGE UP (ref 0.2–1.2)
BUN SERPL-MCNC: 20 MG/DL — SIGNIFICANT CHANGE UP (ref 10–20)
CALCIUM SERPL-MCNC: 10.3 MG/DL — HIGH (ref 8.5–10.1)
CHLORIDE SERPL-SCNC: 96 MMOL/L — LOW (ref 98–110)
CO2 SERPL-SCNC: 25 MMOL/L — SIGNIFICANT CHANGE UP (ref 17–32)
CREAT SERPL-MCNC: 1.2 MG/DL — SIGNIFICANT CHANGE UP (ref 0.7–1.5)
GLUCOSE BLDC GLUCOMTR-MCNC: 111 MG/DL — HIGH (ref 70–99)
GLUCOSE BLDC GLUCOMTR-MCNC: 120 MG/DL — HIGH (ref 70–99)
GLUCOSE BLDC GLUCOMTR-MCNC: 124 MG/DL — HIGH (ref 70–99)
GLUCOSE BLDC GLUCOMTR-MCNC: 158 MG/DL — HIGH (ref 70–99)
GLUCOSE SERPL-MCNC: 150 MG/DL — HIGH (ref 70–99)
HCT VFR BLD CALC: 30 % — LOW (ref 37–47)
HGB BLD-MCNC: 9.6 G/DL — LOW (ref 12–16)
INR BLD: 1.4 RATIO — HIGH (ref 0.65–1.3)
MCHC RBC-ENTMCNC: 28.8 PG — SIGNIFICANT CHANGE UP (ref 27–31)
MCHC RBC-ENTMCNC: 32 G/DL — SIGNIFICANT CHANGE UP (ref 32–37)
MCV RBC AUTO: 90.1 FL — SIGNIFICANT CHANGE UP (ref 81–99)
NRBC # BLD: 0 /100 WBCS — SIGNIFICANT CHANGE UP (ref 0–0)
PLATELET # BLD AUTO: 217 K/UL — SIGNIFICANT CHANGE UP (ref 130–400)
POTASSIUM SERPL-MCNC: 5.2 MMOL/L — HIGH (ref 3.5–5)
POTASSIUM SERPL-SCNC: 5.2 MMOL/L — HIGH (ref 3.5–5)
PROT SERPL-MCNC: 6.5 G/DL — SIGNIFICANT CHANGE UP (ref 6–8)
PROTHROM AB SERPL-ACNC: 16.1 SEC — HIGH (ref 9.95–12.87)
RBC # BLD: 3.33 M/UL — LOW (ref 4.2–5.4)
RBC # FLD: 14.9 % — HIGH (ref 11.5–14.5)
SODIUM SERPL-SCNC: 134 MMOL/L — LOW (ref 135–146)
TACROLIMUS SERPL-MCNC: 2.3 NG/ML — SIGNIFICANT CHANGE UP
WBC # BLD: 9.13 K/UL — SIGNIFICANT CHANGE UP (ref 4.8–10.8)
WBC # FLD AUTO: 9.13 K/UL — SIGNIFICANT CHANGE UP (ref 4.8–10.8)

## 2019-10-16 PROCEDURE — 93010 ELECTROCARDIOGRAM REPORT: CPT

## 2019-10-16 PROCEDURE — 71045 X-RAY EXAM CHEST 1 VIEW: CPT | Mod: 26

## 2019-10-16 RX ADMIN — Medication 500 MICROGRAM(S): at 13:51

## 2019-10-16 RX ADMIN — Medication 500 MICROGRAM(S): at 19:53

## 2019-10-16 RX ADMIN — Medication 100 GRAM(S): at 17:16

## 2019-10-16 RX ADMIN — Medication 25 MILLIGRAM(S): at 05:14

## 2019-10-16 RX ADMIN — Medication 325 MILLIGRAM(S): at 03:40

## 2019-10-16 RX ADMIN — Medication 1 SPRAY(S): at 05:16

## 2019-10-16 RX ADMIN — Medication 1 TABLET(S): at 05:14

## 2019-10-16 RX ADMIN — Medication 40 MILLIGRAM(S): at 05:16

## 2019-10-16 RX ADMIN — Medication 325 MILLIGRAM(S): at 11:39

## 2019-10-16 RX ADMIN — TACROLIMUS 0.5 MILLIGRAM(S): 5 CAPSULE ORAL at 11:39

## 2019-10-16 RX ADMIN — Medication 1 SPRAY(S): at 17:16

## 2019-10-16 RX ADMIN — Medication 1: at 16:38

## 2019-10-16 RX ADMIN — ATORVASTATIN CALCIUM 10 MILLIGRAM(S): 80 TABLET, FILM COATED ORAL at 22:57

## 2019-10-16 RX ADMIN — Medication 25 MILLIGRAM(S): at 17:15

## 2019-10-16 RX ADMIN — Medication 500 MICROGRAM(S): at 08:00

## 2019-10-16 RX ADMIN — Medication 1 APPLICATION(S): at 17:15

## 2019-10-16 RX ADMIN — ZOLPIDEM TARTRATE 5 MILLIGRAM(S): 10 TABLET ORAL at 23:22

## 2019-10-16 RX ADMIN — Medication 325 MILLIGRAM(S): at 23:52

## 2019-10-16 RX ADMIN — Medication 325 MILLIGRAM(S): at 02:04

## 2019-10-16 RX ADMIN — Medication 100 MILLIGRAM(S): at 05:14

## 2019-10-16 RX ADMIN — SENNA PLUS 1 TABLET(S): 8.6 TABLET ORAL at 05:14

## 2019-10-16 RX ADMIN — Medication 600 MILLIGRAM(S): at 17:15

## 2019-10-16 RX ADMIN — Medication 4 UNIT(S): at 07:52

## 2019-10-16 RX ADMIN — Medication 100 MILLIGRAM(S): at 13:36

## 2019-10-16 RX ADMIN — Medication 1 TABLET(S): at 17:18

## 2019-10-16 RX ADMIN — Medication 325 MILLIGRAM(S): at 22:57

## 2019-10-16 RX ADMIN — HEPARIN SODIUM 5000 UNIT(S): 5000 INJECTION INTRAVENOUS; SUBCUTANEOUS at 13:36

## 2019-10-16 RX ADMIN — HEPARIN SODIUM 5000 UNIT(S): 5000 INJECTION INTRAVENOUS; SUBCUTANEOUS at 05:14

## 2019-10-16 RX ADMIN — Medication 180 MILLIGRAM(S): at 22:57

## 2019-10-16 RX ADMIN — CHLORHEXIDINE GLUCONATE 1 APPLICATION(S): 213 SOLUTION TOPICAL at 05:15

## 2019-10-16 RX ADMIN — Medication 600 MILLIGRAM(S): at 05:14

## 2019-10-16 RX ADMIN — FAMOTIDINE 20 MILLIGRAM(S): 10 INJECTION INTRAVENOUS at 17:15

## 2019-10-16 RX ADMIN — Medication 112 MICROGRAM(S): at 05:14

## 2019-10-16 RX ADMIN — Medication 1 APPLICATION(S): at 05:15

## 2019-10-16 RX ADMIN — FAMOTIDINE 20 MILLIGRAM(S): 10 INJECTION INTRAVENOUS at 05:14

## 2019-10-16 RX ADMIN — HEPARIN SODIUM 5000 UNIT(S): 5000 INJECTION INTRAVENOUS; SUBCUTANEOUS at 22:56

## 2019-10-16 RX ADMIN — Medication 100 MILLIGRAM(S): at 22:57

## 2019-10-16 RX ADMIN — SENNA PLUS 1 TABLET(S): 8.6 TABLET ORAL at 17:15

## 2019-10-16 RX ADMIN — Medication 100 GRAM(S): at 05:15

## 2019-10-16 RX ADMIN — SODIUM CHLORIDE 1 GRAM(S): 9 INJECTION INTRAMUSCULAR; INTRAVENOUS; SUBCUTANEOUS at 05:14

## 2019-10-16 RX ADMIN — INSULIN GLARGINE 20 UNIT(S): 100 INJECTION, SOLUTION SUBCUTANEOUS at 07:53

## 2019-10-16 NOTE — PROGRESS NOTE ADULT - SUBJECTIVE AND OBJECTIVE BOX
OPERATIVE PROCEDURE(s):     CABG x 5           POD # 14                      79yFemale  SURGEON(s): JUAREZ Mirza  SUBJECTIVE ASSESSMENT:  Patient has no complaints at this time.    Vital Signs Last 24 Hrs  T(F): 96.9 (16 Oct 2019 04:55), Max: 97.1 (15 Oct 2019 13:00)  HR: 68 (16 Oct 2019 04:55) (64 - 76)  BP: 149/65 (16 Oct 2019 04:55) (133/56 - 149/65)  RR: 18 (16 Oct 2019 04:55) (18 - 18)  SpO2: 98% (16 Oct 2019 08:00) (98% - 98%)      I&O's Detail    15 Oct 2019 07:  -  16 Oct 2019 07:00  --------------------------------------------------------  IN:    Oral Fluid: 420 mL  Total IN: 420 mL    OUT:    Voided: 1100 mL  Total OUT: 1100 mL        Net:   I&O's Detail    14 Oct 2019 07:01  -  15 Oct 2019 07:00  --------------------------------------------------------  Total NET: -1450 mL      15 Oct 2019 07:01  -  16 Oct 2019 07:00  --------------------------------------------------------  Total NET: -680 mL        CAPILLARY BLOOD GLUCOSE  POCT Blood Glucose.: 124 mg/dL (16 Oct 2019 07:50)  POCT Blood Glucose.: 166 mg/dL (15 Oct 2019 21:45)  POCT Blood Glucose.: 167 mg/dL (15 Oct 2019 16:49)    Physical Exam:  General: NAD; A&Ox3  Cardiac: S1/S2, RRR, no murmur, no rubs  Lungs: unlabored respirations, CTA b/l, no wheeze, no rales, no crackles  Abdomen: Soft/NT/ND; positive bowel sounds x 4  Sternum: Intact, no click, incision healing well with no drainage  Incisions: Incisions clean/dry/intact  Extremities: No edema b/l lower extremities; good capillary refill; no cyanosis; palpable 1+ pedal pulses b/l        LABS:                        9.6<L>  9.13  )-----------( 217      ( 16 Oct 2019 00:50 )             30.0<L>                        10.8<L>  8.81  )-----------( 181      ( 15 Oct 2019 06:43 )             33.1<L>    10-    134<L>  |  96<L>  |  20  ----------------------------<  150<H>  5.2<H>   |  25  |  1.2  10-15    130<L>  |  94<L>  |  22<H>  ----------------------------<  176<H>  5.3<H>   |  20  |  1.2    Ca    10.3<H>      16 Oct 2019 05:49    TPro  6.5 [6.0 - 8.0]  /  Alb  4.1 [3.5 - 5.2]  /  TBili  0.6 [0.2 - 1.2]  /  DBili  x   /  AST  14 [0 - 41]  /  ALT  10 [0 - 41]  /  AlkPhos  77 [30 - 115]  10-    PT/INR - ( 15 Oct 2019 11:24 )   PT: ;   INR: 1.65 ratio         PT/INR - ( 14 Oct 2019 11:36 )   PT: ;   INR: 1.80 ratio               RADIOLOGY & ADDITIONAL TESTS:  EK Lead ECG:   Ventricular Rate 73 BPM    Atrial Rate 73 BPM    P-R Interval 110 ms    QRS Duration 82 ms    Q-T Interval 358 ms    QTC Calculation(Bezet) 394 ms    P Axis -12 degrees    R Axis 5 degrees    T Axis 171 degrees    Diagnosis Line Atrial-paced rhythm  T wave abnormality, consider lateral ischemia  Abnormal ECG    Confirmed by Sharif Schwartz ([id number]) on 10/16/2019 9:53:04 AM (10-16-19 @ 07:42)    MEDICATIONS  (STANDING):  aspirin enteric coated 325 milliGRAM(s) Oral daily  atorvastatin 10 milliGRAM(s) Oral at bedtime  BACItracin   Ointment 1 Application(s) Topical two times a day  chlorhexidine 4% Liquid 1 Application(s) Topical <User Schedule>  dextrose 5%. 1000 milliLiter(s) (50 mL/Hr) IV Continuous <Continuous>  dextrose 50% Injectable 50 milliLiter(s) IV Push every 15 minutes  dextrose 50% Injectable 25 milliLiter(s) IV Push every 15 minutes  diltiazem    milliGRAM(s) Oral at bedtime  docusate sodium 100 milliGRAM(s) Oral three times a day  famotidine    Tablet 20 milliGRAM(s) Oral two times a day  fluticasone propionate 50 MICROgram(s)/spray Nasal Spray 1 Spray(s) Both Nostrils two times a day  guaiFENesin  milliGRAM(s) Oral every 12 hours  heparin  Injectable 5000 Unit(s) SubCutaneous every 8 hours  insulin glargine Injectable (LANTUS) 20 Unit(s) SubCutaneous every morning  insulin lispro (HumaLOG) corrective regimen sliding scale   SubCutaneous three times a day before meals  insulin lispro Injectable (HumaLOG) 4 Unit(s) SubCutaneous before breakfast  ipratropium    for Nebulization 500 MICROGram(s) Nebulizer every 6 hours  levothyroxine 112 MICROGram(s) Oral daily  magnesium sulfate  IVPB 1 Gram(s) IV Intermittent every 12 hours  metoprolol tartrate 25 milliGRAM(s) Oral two times a day  polyethylene glycol 3350 17 Gram(s) Oral daily  senna 1 Tablet(s) Oral two times a day  tacrolimus 0.5 milliGRAM(s) Oral daily  trimethoprim  160 mG/sulfamethoxazole 800 mG 1 Tablet(s) Oral every 12 hours    MEDICATIONS  (PRN):  acetaminophen   Tablet .. 325 milliGRAM(s) Oral every 4 hours PRN Mild Pain (1 - 3)  dextrose 40% Gel 15 Gram(s) Oral once PRN Blood Glucose LESS THAN 70 milliGRAM(s)/deciliter  glucagon  Injectable 1 milliGRAM(s) IntraMuscular once PRN Glucose LESS THAN 70 milligrams/deciliter  zolpidem 5 milliGRAM(s) Oral at bedtime PRN Insomnia      Allergies:  No Known Drug Allergies  TEGADERM (Rash)      Ambulation/Activity Status: Ambulates several times daily without assistance.    Assessment/Plan:  79y Female status-post CABG  - Case and plan discussed with CTU Intensivist and CT Surgeon - Dr. Leon/Yuki/Martin   - Continue CTU supportive care    - Continue DVT/GI prophylaxis  - Incentive Spirometry 10 times an hour  - Continue to advance physical activity as tolerated and continue PT/OT as directed  1. CAD: Continue ASA, statin, BB  2. D/C planning in progress

## 2019-10-17 LAB
ALBUMIN SERPL ELPH-MCNC: 3.9 G/DL — SIGNIFICANT CHANGE UP (ref 3.5–5.2)
ALP SERPL-CCNC: 74 U/L — SIGNIFICANT CHANGE UP (ref 30–115)
ALT FLD-CCNC: 9 U/L — SIGNIFICANT CHANGE UP (ref 0–41)
ANION GAP SERPL CALC-SCNC: 14 MMOL/L — SIGNIFICANT CHANGE UP (ref 7–14)
AST SERPL-CCNC: 17 U/L — SIGNIFICANT CHANGE UP (ref 0–41)
BILIRUB SERPL-MCNC: 0.5 MG/DL — SIGNIFICANT CHANGE UP (ref 0.2–1.2)
BUN SERPL-MCNC: 21 MG/DL — HIGH (ref 10–20)
CALCIUM SERPL-MCNC: 9.9 MG/DL — SIGNIFICANT CHANGE UP (ref 8.5–10.1)
CHLORIDE SERPL-SCNC: 99 MMOL/L — SIGNIFICANT CHANGE UP (ref 98–110)
CO2 SERPL-SCNC: 21 MMOL/L — SIGNIFICANT CHANGE UP (ref 17–32)
CREAT SERPL-MCNC: 1.2 MG/DL — SIGNIFICANT CHANGE UP (ref 0.7–1.5)
GLUCOSE BLDC GLUCOMTR-MCNC: 109 MG/DL — HIGH (ref 70–99)
GLUCOSE BLDC GLUCOMTR-MCNC: 109 MG/DL — HIGH (ref 70–99)
GLUCOSE BLDC GLUCOMTR-MCNC: 115 MG/DL — HIGH (ref 70–99)
GLUCOSE BLDC GLUCOMTR-MCNC: 159 MG/DL — HIGH (ref 70–99)
GLUCOSE SERPL-MCNC: 166 MG/DL — HIGH (ref 70–99)
POTASSIUM SERPL-MCNC: 5 MMOL/L — SIGNIFICANT CHANGE UP (ref 3.5–5)
POTASSIUM SERPL-SCNC: 5 MMOL/L — SIGNIFICANT CHANGE UP (ref 3.5–5)
PROT SERPL-MCNC: 6.2 G/DL — SIGNIFICANT CHANGE UP (ref 6–8)
SODIUM SERPL-SCNC: 134 MMOL/L — LOW (ref 135–146)

## 2019-10-17 RX ADMIN — Medication 100 MILLIGRAM(S): at 13:56

## 2019-10-17 RX ADMIN — CHLORHEXIDINE GLUCONATE 1 APPLICATION(S): 213 SOLUTION TOPICAL at 05:26

## 2019-10-17 RX ADMIN — Medication 4 UNIT(S): at 07:42

## 2019-10-17 RX ADMIN — Medication 600 MILLIGRAM(S): at 18:08

## 2019-10-17 RX ADMIN — Medication 1 APPLICATION(S): at 18:08

## 2019-10-17 RX ADMIN — Medication 25 MILLIGRAM(S): at 18:08

## 2019-10-17 RX ADMIN — HEPARIN SODIUM 5000 UNIT(S): 5000 INJECTION INTRAVENOUS; SUBCUTANEOUS at 13:56

## 2019-10-17 RX ADMIN — Medication 500 MICROGRAM(S): at 13:24

## 2019-10-17 RX ADMIN — Medication 1 APPLICATION(S): at 05:26

## 2019-10-17 RX ADMIN — INSULIN GLARGINE 20 UNIT(S): 100 INJECTION, SOLUTION SUBCUTANEOUS at 07:44

## 2019-10-17 RX ADMIN — Medication 25 MILLIGRAM(S): at 05:27

## 2019-10-17 RX ADMIN — SENNA PLUS 1 TABLET(S): 8.6 TABLET ORAL at 05:27

## 2019-10-17 RX ADMIN — TACROLIMUS 0.5 MILLIGRAM(S): 5 CAPSULE ORAL at 11:52

## 2019-10-17 RX ADMIN — Medication 180 MILLIGRAM(S): at 22:04

## 2019-10-17 RX ADMIN — FAMOTIDINE 20 MILLIGRAM(S): 10 INJECTION INTRAVENOUS at 05:27

## 2019-10-17 RX ADMIN — Medication 600 MILLIGRAM(S): at 05:27

## 2019-10-17 RX ADMIN — HEPARIN SODIUM 5000 UNIT(S): 5000 INJECTION INTRAVENOUS; SUBCUTANEOUS at 22:04

## 2019-10-17 RX ADMIN — ZOLPIDEM TARTRATE 5 MILLIGRAM(S): 10 TABLET ORAL at 23:21

## 2019-10-17 RX ADMIN — Medication 500 MICROGRAM(S): at 21:12

## 2019-10-17 RX ADMIN — Medication 500 MICROGRAM(S): at 07:25

## 2019-10-17 RX ADMIN — Medication 100 MILLIGRAM(S): at 05:27

## 2019-10-17 RX ADMIN — FAMOTIDINE 20 MILLIGRAM(S): 10 INJECTION INTRAVENOUS at 18:08

## 2019-10-17 RX ADMIN — Medication 325 MILLIGRAM(S): at 11:53

## 2019-10-17 RX ADMIN — HEPARIN SODIUM 5000 UNIT(S): 5000 INJECTION INTRAVENOUS; SUBCUTANEOUS at 05:26

## 2019-10-17 RX ADMIN — Medication 112 MICROGRAM(S): at 05:27

## 2019-10-17 RX ADMIN — ATORVASTATIN CALCIUM 10 MILLIGRAM(S): 80 TABLET, FILM COATED ORAL at 22:04

## 2019-10-17 RX ADMIN — Medication 1 SPRAY(S): at 05:26

## 2019-10-17 RX ADMIN — Medication 1 TABLET(S): at 05:27

## 2019-10-17 RX ADMIN — Medication 100 GRAM(S): at 05:26

## 2019-10-17 NOTE — PROGRESS NOTE ADULT - SUBJECTIVE AND OBJECTIVE BOX
OPERATIVE PROCEDURE(s):  cabg x 5              POD # 15    SURGEON(s):     SUBJECTIVE ASSESSMENT: pt is without complaints other than she is unable to sleep at night while in the hospital.  The patient wants to be discharged to SNF but the patient's daughter has not been cooperating with discharge process and wants her mom to stay in hospital despite her mom being medically ready for discharge.    Vital Signs Last 24 Hrs  T(C): 36 (17 Oct 2019 12:47), Max: 36.3 (17 Oct 2019 04:53)  T(F): 96.8 (17 Oct 2019 12:47), Max: 97.3 (17 Oct 2019 04:53)  HR: 60 (17 Oct 2019 12:47) (60 - 76)  BP: 130/64 (17 Oct 2019 12:47) (130/64 - 149/67)  BP(mean): --  RR: 18 (17 Oct 2019 12:47) (18 - 18)  SpO2: 97% (17 Oct 2019 09:09) (97% - 97%)    Physical Exam  General: alert and oriented x 3  Chest: cta bl  CVS: s1s2  Abd: pos bs soft nt  GI/ :  Ext: trace edema  incisions- c/d/i  sternum-intact    Central Venous Catheter: Yes[ ]  No[ x] , If Yes indication:           Day #    Temple Catheter: Yes  [ ] , No [x ] : If yes indication:                         Day #    NGT: Yes [ ] No [ x ]     If Yes Placement:                                          Day #    Post-Op Beta-Blockers: Yes [x ], No[ ], If No, then contraindication:    CHEST TUBE:  [ ] YES [ x] NO  OUTPUT:     per 24 hours    AIR LEAKS:  [ ] YES [ ] NO      EPICARDIAL WIRES:  [ ] YES [x ] NO      BOWEL MOVEMENT:  [x YES [ ] NO          LABS:                        9.6    9.13  )-----------( 217      ( 16 Oct 2019 00:50 )             30.0     COUMADIN:   [ ] YES [x ] NO    PT/INR - ( 16 Oct 2019 11:22 )   PT: 16.10 sec;   INR: 1.40 ratio           10-17    134<L>  |  99  |  21<H>  ----------------------------<  166<H>  5.0   |  21  |  1.2    Ca    9.9      17 Oct 2019 01:20    TPro  6.2  /  Alb  3.9  /  TBili  0.5  /  DBili  x   /  AST  17  /  ALT  9   /  AlkPhos  74  10-17        MEDICATIONS  (STANDING):  aspirin enteric coated 325 milliGRAM(s) Oral daily  atorvastatin 10 milliGRAM(s) Oral at bedtime  BACItracin   Ointment 1 Application(s) Topical two times a day  chlorhexidine 4% Liquid 1 Application(s) Topical <User Schedule>  dextrose 5%. 1000 milliLiter(s) (50 mL/Hr) IV Continuous <Continuous>  dextrose 50% Injectable 50 milliLiter(s) IV Push every 15 minutes  dextrose 50% Injectable 25 milliLiter(s) IV Push every 15 minutes  diltiazem    milliGRAM(s) Oral at bedtime  docusate sodium 100 milliGRAM(s) Oral three times a day  famotidine    Tablet 20 milliGRAM(s) Oral two times a day  fluticasone propionate 50 MICROgram(s)/spray Nasal Spray 1 Spray(s) Both Nostrils two times a day  guaiFENesin  milliGRAM(s) Oral every 12 hours  heparin  Injectable 5000 Unit(s) SubCutaneous every 8 hours  insulin glargine Injectable (LANTUS) 20 Unit(s) SubCutaneous every morning  insulin lispro (HumaLOG) corrective regimen sliding scale   SubCutaneous three times a day before meals  insulin lispro Injectable (HumaLOG) 4 Unit(s) SubCutaneous before breakfast  ipratropium    for Nebulization 500 MICROGram(s) Nebulizer every 6 hours  levothyroxine 112 MICROGram(s) Oral daily  magnesium sulfate  IVPB 1 Gram(s) IV Intermittent every 12 hours  metoprolol tartrate 25 milliGRAM(s) Oral two times a day  polyethylene glycol 3350 17 Gram(s) Oral daily  senna 1 Tablet(s) Oral two times a day  tacrolimus 0.5 milliGRAM(s) Oral daily    MEDICATIONS  (PRN):  acetaminophen   Tablet .. 325 milliGRAM(s) Oral every 4 hours PRN Mild Pain (1 - 3)  dextrose 40% Gel 15 Gram(s) Oral once PRN Blood Glucose LESS THAN 70 milliGRAM(s)/deciliter  glucagon  Injectable 1 milliGRAM(s) IntraMuscular once PRN Glucose LESS THAN 70 milligrams/deciliter  zolpidem 5 milliGRAM(s) Oral at bedtime PRN Insomnia      Allergies    No Known Drug Allergies  TEGADERM (Rash)    Intolerances        Ambulation/Activity Status:  ambulated well with pt      RADIOLOGY & ADDITIONAL TESTS:  EXAM:  XR CHEST PORTABLE ROUTINE 1V            PROCEDURE DATE:  10/16/2019      INTERPRETATION:  CLINICAL INDICATION:  Shortness of Breath    COMPARISON: Chest radiograph dated 10/14/2019    TECHNIQUE: Frontal radiograph the chest.    FINDINGS:    Support devices: Stable left pacemaker.    Cardiac/mediastinum/hilum: Cardiomegaly. Prior CABG..    Lung parenchyma/Pleura: Bilateral opacities and effusions, worse on the   left. There is no pneumothorax.    Skeleton/soft tissues: Stable.    IMPRESSION:     Bilateral opacities and effusions, worse in the left. (stable)    Assessment/Plan: Patient is an 80 yo female s/p cabg x 5 pod # 15  cont present tx as per ct surgeon  s/p cabg- cont asa, lop, and statin  dm- fs are well controlled ; cont insulin  htn- cont meds  a. fib- pt now in sr; no AC due to risk of fall  s/p liver transplant - cont tacrolimus  dvt/gi ppx    Social Service Disposition:  to snf assuming insurance approves; pt has been intended to be discharged within the next 24 hours being she is medically stable; possibly may have to start appeal process if family remains uncooperative ; corie's were sent today

## 2019-10-18 ENCOUNTER — TRANSCRIPTION ENCOUNTER (OUTPATIENT)
Age: 79
End: 2019-10-18

## 2019-10-18 VITALS — DIASTOLIC BLOOD PRESSURE: 70 MMHG | SYSTOLIC BLOOD PRESSURE: 154 MMHG | HEART RATE: 80 BPM

## 2019-10-18 LAB
GLUCOSE BLDC GLUCOMTR-MCNC: 124 MG/DL — HIGH (ref 70–99)
GLUCOSE BLDC GLUCOMTR-MCNC: 144 MG/DL — HIGH (ref 70–99)
GLUCOSE BLDC GLUCOMTR-MCNC: 156 MG/DL — HIGH (ref 70–99)

## 2019-10-18 RX ORDER — POLYETHYLENE GLYCOL 3350 17 G/17G
17 POWDER, FOR SOLUTION ORAL
Qty: 0 | Refills: 0 | DISCHARGE
Start: 2019-10-18

## 2019-10-18 RX ORDER — DILTIAZEM HCL 120 MG
1 CAPSULE, EXT RELEASE 24 HR ORAL
Qty: 0 | Refills: 0 | DISCHARGE
Start: 2019-10-18

## 2019-10-18 RX ORDER — ZOLPIDEM TARTRATE 10 MG/1
1 TABLET ORAL
Qty: 0 | Refills: 0 | DISCHARGE

## 2019-10-18 RX ORDER — ASPIRIN/CALCIUM CARB/MAGNESIUM 324 MG
1 TABLET ORAL
Qty: 0 | Refills: 0 | DISCHARGE

## 2019-10-18 RX ORDER — HEPARIN SODIUM 5000 [USP'U]/ML
5000 INJECTION INTRAVENOUS; SUBCUTANEOUS
Qty: 0 | Refills: 0 | DISCHARGE
Start: 2019-10-18

## 2019-10-18 RX ORDER — FLUTICASONE PROPIONATE 50 MCG
1 SPRAY, SUSPENSION NASAL
Qty: 0 | Refills: 0 | DISCHARGE
Start: 2019-10-18

## 2019-10-18 RX ORDER — TACROLIMUS 5 MG/1
1 CAPSULE ORAL
Qty: 0 | Refills: 0 | DISCHARGE

## 2019-10-18 RX ORDER — DOCUSATE SODIUM 100 MG
1 CAPSULE ORAL
Qty: 0 | Refills: 0 | DISCHARGE
Start: 2019-10-18

## 2019-10-18 RX ORDER — LEVOTHYROXINE SODIUM 125 MCG
1 TABLET ORAL
Qty: 0 | Refills: 0 | DISCHARGE
Start: 2019-10-18

## 2019-10-18 RX ORDER — INSULIN GLARGINE 100 [IU]/ML
12 INJECTION, SOLUTION SUBCUTANEOUS
Qty: 0 | Refills: 0 | DISCHARGE
Start: 2019-10-18

## 2019-10-18 RX ORDER — METOPROLOL TARTRATE 50 MG
1 TABLET ORAL
Qty: 0 | Refills: 0 | DISCHARGE
Start: 2019-10-18

## 2019-10-18 RX ORDER — ACETAMINOPHEN 500 MG
1 TABLET ORAL
Qty: 0 | Refills: 0 | DISCHARGE
Start: 2019-10-18

## 2019-10-18 RX ORDER — IPRATROPIUM BROMIDE 0.2 MG/ML
2.5 SOLUTION, NON-ORAL INHALATION
Qty: 0 | Refills: 0 | DISCHARGE
Start: 2019-10-18

## 2019-10-18 RX ORDER — INSULIN GLARGINE 100 [IU]/ML
20 INJECTION, SOLUTION SUBCUTANEOUS
Qty: 0 | Refills: 0 | DISCHARGE
Start: 2019-10-18

## 2019-10-18 RX ORDER — INSULIN GLARGINE 100 [IU]/ML
18 INJECTION, SOLUTION SUBCUTANEOUS
Qty: 0 | Refills: 0 | DISCHARGE

## 2019-10-18 RX ORDER — ASPIRIN/CALCIUM CARB/MAGNESIUM 324 MG
1 TABLET ORAL
Qty: 0 | Refills: 0 | DISCHARGE
Start: 2019-10-18

## 2019-10-18 RX ORDER — NIFEDIPINE 30 MG
1 TABLET, EXTENDED RELEASE 24 HR ORAL
Qty: 0 | Refills: 0 | DISCHARGE

## 2019-10-18 RX ORDER — ATORVASTATIN CALCIUM 80 MG/1
1 TABLET, FILM COATED ORAL
Qty: 0 | Refills: 0 | DISCHARGE
Start: 2019-10-18

## 2019-10-18 RX ORDER — TACROLIMUS 5 MG/1
1 CAPSULE ORAL
Qty: 0 | Refills: 0 | DISCHARGE
Start: 2019-10-18

## 2019-10-18 RX ORDER — FAMOTIDINE 10 MG/ML
1 INJECTION INTRAVENOUS
Qty: 0 | Refills: 0 | DISCHARGE
Start: 2019-10-18

## 2019-10-18 RX ORDER — LEVOTHYROXINE SODIUM 125 MCG
1 TABLET ORAL
Qty: 0 | Refills: 0 | DISCHARGE

## 2019-10-18 RX ORDER — METOPROLOL TARTRATE 50 MG
1 TABLET ORAL
Qty: 0 | Refills: 0 | DISCHARGE

## 2019-10-18 RX ORDER — ZOLPIDEM TARTRATE 10 MG/1
1 TABLET ORAL
Qty: 30 | Refills: 0
Start: 2019-10-18 | End: 2019-11-16

## 2019-10-18 RX ORDER — BACITRACIN ZINC 500 UNIT/G
1 OINTMENT IN PACKET (EA) TOPICAL
Qty: 0 | Refills: 0 | DISCHARGE
Start: 2019-10-18

## 2019-10-18 RX ADMIN — FAMOTIDINE 20 MILLIGRAM(S): 10 INJECTION INTRAVENOUS at 05:36

## 2019-10-18 RX ADMIN — FAMOTIDINE 20 MILLIGRAM(S): 10 INJECTION INTRAVENOUS at 18:04

## 2019-10-18 RX ADMIN — SENNA PLUS 1 TABLET(S): 8.6 TABLET ORAL at 05:36

## 2019-10-18 RX ADMIN — Medication 1: at 18:03

## 2019-10-18 RX ADMIN — Medication 4 UNIT(S): at 07:29

## 2019-10-18 RX ADMIN — Medication 112 MICROGRAM(S): at 05:36

## 2019-10-18 RX ADMIN — Medication 25 MILLIGRAM(S): at 05:36

## 2019-10-18 RX ADMIN — Medication 1 APPLICATION(S): at 18:04

## 2019-10-18 RX ADMIN — Medication 600 MILLIGRAM(S): at 18:05

## 2019-10-18 RX ADMIN — INSULIN GLARGINE 20 UNIT(S): 100 INJECTION, SOLUTION SUBCUTANEOUS at 07:29

## 2019-10-18 RX ADMIN — Medication 25 MILLIGRAM(S): at 18:05

## 2019-10-18 RX ADMIN — HEPARIN SODIUM 5000 UNIT(S): 5000 INJECTION INTRAVENOUS; SUBCUTANEOUS at 13:07

## 2019-10-18 RX ADMIN — Medication 325 MILLIGRAM(S): at 13:06

## 2019-10-18 RX ADMIN — Medication 100 MILLIGRAM(S): at 05:36

## 2019-10-18 RX ADMIN — Medication 1 APPLICATION(S): at 05:37

## 2019-10-18 RX ADMIN — Medication 325 MILLIGRAM(S): at 18:08

## 2019-10-18 RX ADMIN — Medication 600 MILLIGRAM(S): at 05:36

## 2019-10-18 RX ADMIN — Medication 325 MILLIGRAM(S): at 14:31

## 2019-10-18 RX ADMIN — HEPARIN SODIUM 5000 UNIT(S): 5000 INJECTION INTRAVENOUS; SUBCUTANEOUS at 05:38

## 2019-10-18 RX ADMIN — CHLORHEXIDINE GLUCONATE 1 APPLICATION(S): 213 SOLUTION TOPICAL at 05:43

## 2019-10-18 RX ADMIN — TACROLIMUS 0.5 MILLIGRAM(S): 5 CAPSULE ORAL at 13:06

## 2019-10-18 RX ADMIN — Medication 1 SPRAY(S): at 05:37

## 2019-10-18 NOTE — DISCHARGE NOTE NURSING/CASE MANAGEMENT/SOCIAL WORK - NSDCPEWEB_GEN_ALL_CORE
[facility] for Tobacco Control website --- [email or web address] website --- [email or web address]

## 2019-10-18 NOTE — DISCHARGE NOTE NURSING/CASE MANAGEMENT/SOCIAL WORK - NSDCPEEMAIL_GEN_ALL_CORE
[facility] for Tobacco Control email [email or web address]

## 2019-10-18 NOTE — PROGRESS NOTE ADULT - SUBJECTIVE AND OBJECTIVE BOX
OPERATIVE PROCEDURE(s):   cabg x 5             POD # 16    SURGEON(s): [place]      SUBJECTIVE ASSESSMENT: pt is complaining of wanting to be discharged from hospital to [facility] and has now finally agreed to be discharged to [facility] after the patient and family were informed that there was no bed available at [facility] against  a lot of resistance.  Multiple members of the case managment team and administrators explained to the patient all about the discharge / appeal process being the family refused to care for her at home.    Vital Signs Last 24 Hrs  T(C): 35.6 (18 Oct 2019 13:54), Max: 36.5 (18 Oct 2019 05:42)  T(F): 96 (18 Oct 2019 13:54), Max: 97.7 (18 Oct 2019 05:42)  HR: 78 (18 Oct 2019 13:54) (71 - 91)  BP: 135/57 (18 Oct 2019 13:54) (135/57 - 148/68)  BP(mean): --  RR: 18 (18 Oct 2019 13:54) (18 - 18)  SpO2: 96% (18 Oct 2019 08:29) (96% - 96%)  10-17-19 @ 07:01  -  10-18-19 @ 07:00  --------------------------------------------------------  IN: 450 mL / OUT: 600 mL / NET: -150 mL    10-18-19 @ 07:01  -  10-18-19 @ 14:52  --------------------------------------------------------  IN: 240 mL / OUT: 0 mL / NET: 240 mL    Physical Exam  General: alert and oriented x 3  Chest: cta bl  CVS: s1s2  Abd: pos bs soft nt  GI/ :  Ext: trace edema  incisions- c/d/i  sternum-intact    LABS: no new labs    COUMADIN:   [ ] YES [x ] NO      10-17    134<L>  |  99  |  21<H>  ----------------------------<  166<H>  5.0   |  21  |  1.2    Ca    9.9      17 Oct 2019 01:20    TPro  6.2  /  Alb  3.9  /  TBili  0.5  /  DBili  x   /  AST  17  /  ALT  9   /  AlkPhos  74  10-17    MEDICATIONS  (STANDING):  aspirin enteric coated 325 milliGRAM(s) Oral daily  atorvastatin 10 milliGRAM(s) Oral at bedtime  BACItracin   Ointment 1 Application(s) Topical two times a day  chlorhexidine 4% Liquid 1 Application(s) Topical <User Schedule>  dextrose 5%. 1000 milliLiter(s) (50 mL/Hr) IV Continuous <Continuous>  dextrose 50% Injectable 50 milliLiter(s) IV Push every 15 minutes  dextrose 50% Injectable 25 milliLiter(s) IV Push every 15 minutes  diltiazem    milliGRAM(s) Oral at bedtime  docusate sodium 100 milliGRAM(s) Oral three times a day  famotidine    Tablet 20 milliGRAM(s) Oral two times a day  fluticasone propionate 50 MICROgram(s)/spray Nasal Spray 1 Spray(s) Both Nostrils two times a day  guaiFENesin  milliGRAM(s) Oral every 12 hours  heparin  Injectable 5000 Unit(s) SubCutaneous every 8 hours  insulin glargine Injectable (LANTUS) 20 Unit(s) SubCutaneous every morning  insulin lispro (HumaLOG) corrective regimen sliding scale   SubCutaneous three times a day before meals  insulin lispro Injectable (HumaLOG) 4 Unit(s) SubCutaneous before breakfast  ipratropium    for Nebulization 500 MICROGram(s) Nebulizer every 6 hours  levothyroxine 112 MICROGram(s) Oral daily  metoprolol tartrate 25 milliGRAM(s) Oral two times a day  polyethylene glycol 3350 17 Gram(s) Oral daily  senna 1 Tablet(s) Oral two times a day  tacrolimus 0.5 milliGRAM(s) Oral daily    MEDICATIONS  (PRN):  acetaminophen   Tablet .. 325 milliGRAM(s) Oral every 4 hours PRN Mild Pain (1 - 3)  dextrose 40% Gel 15 Gram(s) Oral once PRN Blood Glucose LESS THAN 70 milliGRAM(s)/deciliter  glucagon  Injectable 1 milliGRAM(s) IntraMuscular once PRN Glucose LESS THAN 70 milligrams/deciliter      Allergies    No Known Drug Allergies  TEGADERM (Rash)    Intolerances        Ambulation/Activity Status:  ambulates with pt      RADIOLOGY & ADDITIONAL TESTS:    EXAM:  XR CHEST PORTABLE ROUTINE 1V            PROCEDURE DATE:  10/16/2019      INTERPRETATION:  CLINICAL INDICATION:  Shortness of Breath    COMPARISON: Chest radiograph dated 10/14/2019    TECHNIQUE: Frontal radiograph the chest.    FINDINGS:    Support devices: Stable left pacemaker.    Cardiac/mediastinum/hilum: Cardiomegaly. Prior CABG..    Lung parenchyma/Pleura: Bilateral opacities and effusions, worse on the   left. There is no pneumothorax.    Skeleton/soft tissues: Stable.    IMPRESSION:     Bilateral opacities and effusions, worse in the left.      Assessment/Plan: Patient is an 78 yo female s/p cabg x 5 pod # 16  cont present tx as per ct surgeon  s/p cabg- cont asa, lop, and statin  dm- fs are well controlled ; cont insulin  htn- cont meds  a. fib- pt now in sr; no AC due to risk of fall  s/p liver transplant - cont tacrolimus  dvt/gi ppx    Social Service Disposition:  d/c today to [facility]acute [facility]

## 2019-10-18 NOTE — DISCHARGE NOTE NURSING/CASE MANAGEMENT/SOCIAL WORK - PATIENT PORTAL LINK FT
You can access the FollowMyHealth Patient Portal offered by [facility] by registering at the following website: [email or web address]. By joining [employer]’s FollowMyHealth portal, you will also be able to view your health information using other applications (apps) compatible with our system.

## 2019-10-19 ENCOUNTER — OUTPATIENT (OUTPATIENT)
Dept: OUTPATIENT SERVICES | Facility: HOSPITAL | Age: 79
LOS: 1 days | Discharge: HOME | End: 2019-10-19

## 2019-10-19 DIAGNOSIS — Z95.0 PRESENCE OF CARDIAC PACEMAKER: Chronic | ICD-10-CM

## 2019-10-19 DIAGNOSIS — D64.9 ANEMIA, UNSPECIFIED: ICD-10-CM

## 2019-10-19 DIAGNOSIS — Z94.4 LIVER TRANSPLANT STATUS: Chronic | ICD-10-CM

## 2019-10-19 DIAGNOSIS — Z95.5 PRESENCE OF CORONARY ANGIOPLASTY IMPLANT AND GRAFT: Chronic | ICD-10-CM

## 2019-10-21 ENCOUNTER — OUTPATIENT (OUTPATIENT)
Dept: OUTPATIENT SERVICES | Facility: HOSPITAL | Age: 79
LOS: 1 days | Discharge: HOME | End: 2019-10-21

## 2019-10-21 DIAGNOSIS — Z95.0 PRESENCE OF CARDIAC PACEMAKER: Chronic | ICD-10-CM

## 2019-10-21 DIAGNOSIS — E03.9 HYPOTHYROIDISM, UNSPECIFIED: ICD-10-CM

## 2019-10-21 DIAGNOSIS — Z94.4 LIVER TRANSPLANT STATUS: Chronic | ICD-10-CM

## 2019-10-21 DIAGNOSIS — Z95.5 PRESENCE OF CORONARY ANGIOPLASTY IMPLANT AND GRAFT: Chronic | ICD-10-CM

## 2019-10-22 ENCOUNTER — OUTPATIENT (OUTPATIENT)
Dept: OUTPATIENT SERVICES | Facility: HOSPITAL | Age: 79
LOS: 1 days | Discharge: HOME | End: 2019-10-22

## 2019-10-22 VITALS
HEART RATE: 72 BPM | OXYGEN SATURATION: 95 % | SYSTOLIC BLOOD PRESSURE: 140 MMHG | RESPIRATION RATE: 12 BRPM | DIASTOLIC BLOOD PRESSURE: 70 MMHG

## 2019-10-22 DIAGNOSIS — E87.5 HYPERKALEMIA: ICD-10-CM

## 2019-10-22 DIAGNOSIS — Z94.4 LIVER TRANSPLANT STATUS: Chronic | ICD-10-CM

## 2019-10-22 DIAGNOSIS — Z95.5 PRESENCE OF CORONARY ANGIOPLASTY IMPLANT AND GRAFT: Chronic | ICD-10-CM

## 2019-10-22 DIAGNOSIS — Z95.0 PRESENCE OF CARDIAC PACEMAKER: Chronic | ICD-10-CM

## 2019-10-22 DIAGNOSIS — E11.9 TYPE 2 DIABETES MELLITUS W/OUT COMPLICATIONS: ICD-10-CM

## 2019-10-22 DIAGNOSIS — Z94.4 LIVER TRANSPLANT STATUS: ICD-10-CM

## 2019-10-22 RX ORDER — LOSARTAN POTASSIUM 100 MG/1
100 TABLET, FILM COATED ORAL DAILY
Refills: 0 | Status: DISCONTINUED | COMMUNITY
End: 2019-10-22

## 2019-10-22 RX ORDER — ASPIRIN 81 MG/1
81 TABLET, COATED ORAL DAILY
Refills: 0 | Status: DISCONTINUED | COMMUNITY
End: 2019-10-22

## 2019-10-22 RX ORDER — FAMOTIDINE 20 MG/1
20 TABLET, FILM COATED ORAL TWICE DAILY
Refills: 0 | Status: ACTIVE | COMMUNITY

## 2019-10-22 RX ORDER — ATORVASTATIN CALCIUM 10 MG/1
10 TABLET, FILM COATED ORAL
Refills: 0 | Status: ACTIVE | COMMUNITY

## 2019-10-22 RX ORDER — POLYETHYLENE GLYCOL 3350 17 G/17G
17 POWDER, FOR SOLUTION ORAL
Refills: 0 | Status: ACTIVE | COMMUNITY
Start: 2019-10-22

## 2019-10-22 RX ORDER — LEVOTHYROXINE SODIUM 0.11 MG/1
112 TABLET ORAL DAILY
Refills: 0 | Status: ACTIVE | COMMUNITY

## 2019-10-22 RX ORDER — TORSEMIDE 100 MG/1
100 TABLET ORAL DAILY
Refills: 0 | Status: DISCONTINUED | COMMUNITY
End: 2019-10-22

## 2019-10-22 RX ORDER — TACROLIMUS 0.5 MG/1
0.5 CAPSULE, GELATIN COATED ORAL DAILY
Refills: 0 | Status: ACTIVE | COMMUNITY

## 2019-10-22 RX ORDER — ASPIRIN 325 MG/1
325 TABLET ORAL DAILY
Refills: 0 | Status: ACTIVE | COMMUNITY
Start: 2019-10-22

## 2019-10-22 RX ORDER — ZOLPIDEM TARTRATE 5 MG/1
5 TABLET, FILM COATED ORAL
Refills: 0 | Status: ACTIVE | COMMUNITY
Start: 2019-10-22

## 2019-10-22 RX ORDER — METOPROLOL SUCCINATE 100 MG/1
100 TABLET, EXTENDED RELEASE ORAL DAILY
Qty: 30 | Refills: 3 | Status: DISCONTINUED | COMMUNITY
Start: 2018-04-03 | End: 2019-10-22

## 2019-10-22 NOTE — ASSESSMENT
[FreeTextEntry1] : Education\par 1.  DC instructions reviewed with patient - discussed importance of medications (indication, schedule, side effects, and importance of compliance reviewed) and f/u appointments.\par 2.  Clean incision sites daily - shower indirectly, clean with soap and water, pat dry.  Avoid the use of lotions, ointments, powders, and perfumes on or around incision sites.\par 3.  Sternal precautions - avoid any heavy lifting (>5-10 lbs x 8 weeks), raising both arms above head simultaneously.\par 4.  Place TEDs on in AM prior to getting out of bed and off in PM when returning to bed.\par 5.  Follow a heart healthy diet - low salt, low fat.\par 6.  Exercise daily.\par 7.  Monitor and call [facility] NP for signs and symptoms of infection (redness, drainage, and fever).\par 8.  Monitor daily weights (call for a gain of 2-3 lbs/day or 5-6 lbs/week). \par 9.  Blood sugar control necessary for wound healing if applicable.\par 10.  Avoid straining during BM - use stool softners as needed. \par 11.  Instructed on importance of incentive spirometry use (10x/hour).\par \par Patient verbalized understanding of all education outlined above. Pt instructed to call [facility] NP for any issues as delineated above.\par \par PLAN\par 1.  Monitor daily weights\par 2.  Continue current medications as prescribed\par 3.  Incentive spirometry use\par 4.  Maintain sternal precautions\par 5.  Exercise daily\par 5.  Maintain HH diet\par 6.  Maintain TEDs\par 7.  Keep legs elevated\par 8.  F/U appointments - \par CTSx Dr. Mirza 10/29\par 9.  [facility] NP will continue to f/u with patient status - Pt agrees to call with any questions/concerns\par 10. To recover without complications.\par 11.  Continue with daily PT.\par

## 2019-10-22 NOTE — PHYSICAL EXAM
[] : no respiratory distress [Auscultation Breath Sounds / Voice Sounds] : lungs were clear to auscultation bilaterally [Heart Sounds] : normal S1 and S2 [Heart Rate And Rhythm] : heart rate was normal and rhythm regular [Heart Sounds Gallop] : no gallops [Murmurs] : no murmurs [Heart Sounds Pericardial Friction Rub] : no pericardial rub [Examination Of The Chest] : the chest was normal in appearance [Chest Visual Inspection Thoracic Asymmetry] : no chest asymmetry [Diminished Respiratory Excursion] : normal chest expansion [FreeTextEntry1] : sternal incision C/D/I, no click noted

## 2019-10-22 NOTE — HISTORY OF PRESENT ILLNESS
[FreeTextEntry1] : FOLLOW YOUR HEART\par \par 10/18:  Hospital Course	 \par 78 yo female with CAD s/p PCI x 5 (done in [place])  over may years (LAD/LCX/OM), last PCI> 5 years ago, PPM ([facility]) complicated by pericardial tamponade s/p pericardiocentesis ([place]) presenting for dyspnea on exertion. She was seeing Dr. Portillo in the office and he had planned her for a cardiac cath. based on an abnormal NST done in [place] in May 2019.  The patient also has a history of liver transplant done 10 years ago in [place], [place].  Patient presented today for elective cardiac catheterization.  She denies chest pain, palpitations, dizziness. She admits that prior to her previous stents, her symptoms were not chest pain but HESS.  Cardiac catheterization revealed severe 3vCAD with normal LV function on LV gram. On 10/03/2019, she underwent myocardial revascularization.  Postoperatively the patient developed atrial fibrillation but converted to SR with b blocker and cardizem.  GI evaluated the patient and recommended that the patient refrain from being treated with Amio due to her liver transplant.  She was then discharged to [facility]  on POD # 16 after much resistance from her family re nursing home preference. \par \par Patient is seen at [facility].  Patient appears comfortable and states she's "very happy here" and feels she is well taken care of.  Patient reports she participates in PT daily.  Patient offers no questions, complaints, or concerns at this time.

## 2019-10-23 ENCOUNTER — OUTPATIENT (OUTPATIENT)
Dept: OUTPATIENT SERVICES | Facility: HOSPITAL | Age: 79
LOS: 1 days | Discharge: HOME | End: 2019-10-23

## 2019-10-23 DIAGNOSIS — B37.3 CANDIDIASIS OF VULVA AND VAGINA: ICD-10-CM

## 2019-10-23 DIAGNOSIS — Z94.4 LIVER TRANSPLANT STATUS: Chronic | ICD-10-CM

## 2019-10-23 DIAGNOSIS — Z94.4 LIVER TRANSPLANT STATUS: ICD-10-CM

## 2019-10-23 DIAGNOSIS — Z95.5 PRESENCE OF CORONARY ANGIOPLASTY IMPLANT AND GRAFT: Chronic | ICD-10-CM

## 2019-10-23 DIAGNOSIS — I48.91 UNSPECIFIED ATRIAL FIBRILLATION: ICD-10-CM

## 2019-10-23 DIAGNOSIS — E87.5 HYPERKALEMIA: ICD-10-CM

## 2019-10-23 DIAGNOSIS — Z95.0 PRESENCE OF CARDIAC PACEMAKER: Chronic | ICD-10-CM

## 2019-10-23 LAB — GLUCOSE BLDC GLUCOMTR-MCNC: 129 MG/DL — HIGH (ref 70–99)

## 2019-10-26 DIAGNOSIS — I25.2 OLD MYOCARDIAL INFARCTION: ICD-10-CM

## 2019-10-26 DIAGNOSIS — E03.9 HYPOTHYROIDISM, UNSPECIFIED: ICD-10-CM

## 2019-10-26 DIAGNOSIS — D69.6 THROMBOCYTOPENIA, UNSPECIFIED: ICD-10-CM

## 2019-10-26 DIAGNOSIS — R94.39 ABNORMAL RESULT OF OTHER CARDIOVASCULAR FUNCTION STUDY: ICD-10-CM

## 2019-10-26 DIAGNOSIS — Z85.05 PERSONAL HISTORY OF MALIGNANT NEOPLASM OF LIVER: ICD-10-CM

## 2019-10-26 DIAGNOSIS — E66.01 MORBID (SEVERE) OBESITY DUE TO EXCESS CALORIES: ICD-10-CM

## 2019-10-26 DIAGNOSIS — E87.1 HYPO-OSMOLALITY AND HYPONATREMIA: ICD-10-CM

## 2019-10-26 DIAGNOSIS — I48.91 UNSPECIFIED ATRIAL FIBRILLATION: ICD-10-CM

## 2019-10-26 DIAGNOSIS — D62 ACUTE POSTHEMORRHAGIC ANEMIA: ICD-10-CM

## 2019-10-26 DIAGNOSIS — I31.9 DISEASE OF PERICARDIUM, UNSPECIFIED: ICD-10-CM

## 2019-10-26 DIAGNOSIS — Z79.4 LONG TERM (CURRENT) USE OF INSULIN: ICD-10-CM

## 2019-10-26 DIAGNOSIS — N17.9 ACUTE KIDNEY FAILURE, UNSPECIFIED: ICD-10-CM

## 2019-10-26 DIAGNOSIS — I25.110 ATHEROSCLEROTIC HEART DISEASE OF NATIVE CORONARY ARTERY WITH UNSTABLE ANGINA PECTORIS: ICD-10-CM

## 2019-10-26 DIAGNOSIS — Z95.5 PRESENCE OF CORONARY ANGIOPLASTY IMPLANT AND GRAFT: ICD-10-CM

## 2019-10-26 DIAGNOSIS — T82.855A STENOSIS OF CORONARY ARTERY STENT, INITIAL ENCOUNTER: ICD-10-CM

## 2019-10-26 DIAGNOSIS — J44.9 CHRONIC OBSTRUCTIVE PULMONARY DISEASE, UNSPECIFIED: ICD-10-CM

## 2019-10-26 DIAGNOSIS — I97.89 OTHER POSTPROCEDURAL COMPLICATIONS AND DISORDERS OF THE CIRCULATORY SYSTEM, NOT ELSEWHERE CLASSIFIED: ICD-10-CM
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
## 2019-10-26 DIAGNOSIS — E11.9 TYPE 2 DIABETES MELLITUS WITHOUT COMPLICATIONS: ICD-10-CM
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
## 2019-10-26 DIAGNOSIS — D68.9 COAGULATION DEFECT, UNSPECIFIED: ICD-10-CM

## 2019-10-26 DIAGNOSIS — Z95.0 PRESENCE OF CARDIAC PACEMAKER: ICD-10-CM

## 2019-10-26 DIAGNOSIS — Z94.4 LIVER TRANSPLANT STATUS: ICD-10-CM

## 2019-10-28 ENCOUNTER — OUTPATIENT (OUTPATIENT)
Dept: OUTPATIENT SERVICES | Facility: HOSPITAL | Age: 79
LOS: 1 days | Discharge: HOME | End: 2019-10-28

## 2019-10-28 DIAGNOSIS — Z94.4 LIVER TRANSPLANT STATUS: Chronic | ICD-10-CM

## 2019-10-28 DIAGNOSIS — Z95.5 PRESENCE OF CORONARY ANGIOPLASTY IMPLANT AND GRAFT: Chronic | ICD-10-CM

## 2019-10-28 DIAGNOSIS — Z95.0 PRESENCE OF CARDIAC PACEMAKER: Chronic | ICD-10-CM

## 2019-10-28 DIAGNOSIS — E87.5 HYPERKALEMIA: ICD-10-CM

## 2019-10-29 ENCOUNTER — APPOINTMENT (OUTPATIENT)
Dept: CARDIOTHORACIC SURGERY | Facility: CLINIC | Age: 79
End: 2019-10-29
Payer: MEDICARE

## 2019-10-29 VITALS
WEIGHT: 141 LBS | DIASTOLIC BLOOD PRESSURE: 88 MMHG | SYSTOLIC BLOOD PRESSURE: 161 MMHG | OXYGEN SATURATION: 98 % | TEMPERATURE: 97.6 F | RESPIRATION RATE: 12 BRPM | BODY MASS INDEX: 24.98 KG/M2 | HEART RATE: 64 BPM

## 2019-10-29 DIAGNOSIS — I10 ESSENTIAL (PRIMARY) HYPERTENSION: ICD-10-CM

## 2019-10-29 PROCEDURE — 99024 POSTOP FOLLOW-UP VISIT: CPT

## 2019-10-31 ENCOUNTER — APPOINTMENT (OUTPATIENT)
Dept: CARDIOLOGY | Facility: CLINIC | Age: 79
End: 2019-10-31
Payer: MEDICARE

## 2019-10-31 PROCEDURE — 99213 OFFICE O/P EST LOW 20 MIN: CPT

## 2019-10-31 PROCEDURE — 93000 ELECTROCARDIOGRAM COMPLETE: CPT

## 2019-11-01 ENCOUNTER — OUTPATIENT (OUTPATIENT)
Dept: OUTPATIENT SERVICES | Facility: HOSPITAL | Age: 79
LOS: 1 days | Discharge: HOME | End: 2019-11-01

## 2019-11-01 DIAGNOSIS — Z94.4 LIVER TRANSPLANT STATUS: Chronic | ICD-10-CM

## 2019-11-01 DIAGNOSIS — Z95.5 PRESENCE OF CORONARY ANGIOPLASTY IMPLANT AND GRAFT: Chronic | ICD-10-CM

## 2019-11-01 DIAGNOSIS — Z95.0 PRESENCE OF CARDIAC PACEMAKER: Chronic | ICD-10-CM

## 2019-11-01 DIAGNOSIS — I10 ESSENTIAL (PRIMARY) HYPERTENSION: ICD-10-CM

## 2019-11-02 ENCOUNTER — OUTPATIENT (OUTPATIENT)
Dept: OUTPATIENT SERVICES | Facility: HOSPITAL | Age: 79
LOS: 1 days | Discharge: HOME | End: 2019-11-02

## 2019-11-02 DIAGNOSIS — Z95.0 PRESENCE OF CARDIAC PACEMAKER: Chronic | ICD-10-CM

## 2019-11-02 DIAGNOSIS — Z95.5 PRESENCE OF CORONARY ANGIOPLASTY IMPLANT AND GRAFT: Chronic | ICD-10-CM

## 2019-11-02 DIAGNOSIS — I25.810 ATHEROSCLEROSIS OF CORONARY ARTERY BYPASS GRAFT(S) WITHOUT ANGINA PECTORIS: ICD-10-CM

## 2019-11-02 DIAGNOSIS — Z94.4 LIVER TRANSPLANT STATUS: Chronic | ICD-10-CM

## 2019-11-12 ENCOUNTER — OUTPATIENT (OUTPATIENT)
Dept: OUTPATIENT SERVICES | Facility: HOSPITAL | Age: 79
LOS: 1 days | Discharge: HOME | End: 2019-11-12

## 2019-11-12 DIAGNOSIS — Z95.0 PRESENCE OF CARDIAC PACEMAKER: Chronic | ICD-10-CM

## 2019-11-12 DIAGNOSIS — Z95.5 PRESENCE OF CORONARY ANGIOPLASTY IMPLANT AND GRAFT: Chronic | ICD-10-CM

## 2019-11-12 DIAGNOSIS — I25.810 ATHEROSCLEROSIS OF CORONARY ARTERY BYPASS GRAFT(S) WITHOUT ANGINA PECTORIS: ICD-10-CM

## 2019-11-12 DIAGNOSIS — Z94.4 LIVER TRANSPLANT STATUS: Chronic | ICD-10-CM

## 2019-11-13 NOTE — CHART NOTE - NSCHARTNOTEFT_GEN_A_CORE
Addendum to 10/2/2019 progress notes    Pre-op patient had FEV1 of 35% pre-operatively - diagnosis of severe COPD pre-operatively

## 2019-11-14 NOTE — H&P PST ADULT - HEALTH CARE MAINTENANCE
[FreeTextEntry1] : General:. Alert, cooperative, pleasant young man, in NAD. \par Skin: The skin is intact, warm, pink, and dry over the area examined. \par Eyes: normal conjuntiva, normal eyelids and pupils were equal and round. \par ENT: normal ears, normal nose and normal lips. \par Cardiovascular: brisk capillary refill, but no peripheral edema. \par Respiratory: normal respiratory effort. \par Abdomen: not examined. \par Neurological: 5/5 motor strength in the main muscle groups of bilateral upper and lower extremities, sensory intact in bilateral upper and lower extremities. \par Additional Findings: Gait slight limp noted. \par able to walk heels and toes without difficulty\par visualized getting on and off exam table with good coordination and balance. \par \par  \par Musculoskeletal:. good posture. normal clinical alignment in upper and lower extremities. normal clinical alignment of the spine. full range of motion in bilateral upper and lower extremities. \par Additional Findings: Hips: full symmetrical ROM without tenderness elicited. IR 20 degrees withhips flexed\par Right Knee: moderate effusion noted. Able to SLR without lag.\par Full range of motion of the knee. but tenderness at extreme flexion. +hypermobiltiy of the patella left +apprehension. Tender medial aspect of the patella . \par No instability to varus/valgus stress. \par  Negative Julieta's, negative Lachman, negative pivot shift. No joint line tenderness. \par ankle: full ROM without instability to stress. No tenderness or STS. \par Distal motor 5/5\par sensation grossly intact\par brisk cap refill\par  pap 2015  gabriel 2015  colonoscopy 2010

## 2019-11-16 ENCOUNTER — INPATIENT (INPATIENT)
Facility: HOSPITAL | Age: 79
LOS: 2 days | Discharge: NURSING, REHAB OR LONG-TERM CARE | End: 2019-11-19
Attending: INTERNAL MEDICINE | Admitting: INTERNAL MEDICINE
Payer: MEDICARE

## 2019-11-16 VITALS
DIASTOLIC BLOOD PRESSURE: 94 MMHG | RESPIRATION RATE: 24 BRPM | OXYGEN SATURATION: 90 % | SYSTOLIC BLOOD PRESSURE: 177 MMHG | HEART RATE: 70 BPM

## 2019-11-16 DIAGNOSIS — Z95.1 PRESENCE OF AORTOCORONARY BYPASS GRAFT: Chronic | ICD-10-CM

## 2019-11-16 DIAGNOSIS — Z95.5 PRESENCE OF CORONARY ANGIOPLASTY IMPLANT AND GRAFT: Chronic | ICD-10-CM

## 2019-11-16 DIAGNOSIS — Z94.4 LIVER TRANSPLANT STATUS: Chronic | ICD-10-CM

## 2019-11-16 DIAGNOSIS — Z95.0 PRESENCE OF CARDIAC PACEMAKER: Chronic | ICD-10-CM

## 2019-11-16 LAB
ALBUMIN SERPL ELPH-MCNC: 4.5 G/DL — SIGNIFICANT CHANGE UP (ref 3.5–5.2)
ALP SERPL-CCNC: 92 U/L — SIGNIFICANT CHANGE UP (ref 30–115)
ALT FLD-CCNC: 10 U/L — SIGNIFICANT CHANGE UP (ref 0–41)
ANION GAP SERPL CALC-SCNC: 18 MMOL/L — HIGH (ref 7–14)
APTT BLD: 23.9 SEC — CRITICAL LOW (ref 27–39.2)
AST SERPL-CCNC: 17 U/L — SIGNIFICANT CHANGE UP (ref 0–41)
BASE EXCESS BLDV CALC-SCNC: -0.2 MMOL/L — SIGNIFICANT CHANGE UP (ref -2–2)
BASE EXCESS BLDV CALC-SCNC: 2.6 MMOL/L — HIGH (ref -2–2)
BASOPHILS # BLD AUTO: 0.07 K/UL — SIGNIFICANT CHANGE UP (ref 0–0.2)
BASOPHILS NFR BLD AUTO: 0.5 % — SIGNIFICANT CHANGE UP (ref 0–1)
BILIRUB SERPL-MCNC: 0.3 MG/DL — SIGNIFICANT CHANGE UP (ref 0.2–1.2)
BUN SERPL-MCNC: 18 MG/DL — SIGNIFICANT CHANGE UP (ref 10–20)
CA-I SERPL-SCNC: 1.3 MMOL/L — SIGNIFICANT CHANGE UP (ref 1.12–1.3)
CA-I SERPL-SCNC: 1.36 MMOL/L — HIGH (ref 1.12–1.3)
CALCIUM SERPL-MCNC: 10.2 MG/DL — HIGH (ref 8.5–10.1)
CHLORIDE SERPL-SCNC: 94 MMOL/L — LOW (ref 98–110)
CO2 SERPL-SCNC: 24 MMOL/L — SIGNIFICANT CHANGE UP (ref 17–32)
CREAT SERPL-MCNC: 1.2 MG/DL — SIGNIFICANT CHANGE UP (ref 0.7–1.5)
EOSINOPHIL # BLD AUTO: 0.4 K/UL — SIGNIFICANT CHANGE UP (ref 0–0.7)
EOSINOPHIL NFR BLD AUTO: 3 % — SIGNIFICANT CHANGE UP (ref 0–8)
GAS PNL BLDV: 137 MMOL/L — SIGNIFICANT CHANGE UP (ref 136–145)
GAS PNL BLDV: 140 MMOL/L — SIGNIFICANT CHANGE UP (ref 136–145)
GAS PNL BLDV: SIGNIFICANT CHANGE UP
GAS PNL BLDV: SIGNIFICANT CHANGE UP
GLUCOSE SERPL-MCNC: 278 MG/DL — HIGH (ref 70–99)
HCO3 BLDV-SCNC: 29 MMOL/L — SIGNIFICANT CHANGE UP (ref 22–29)
HCO3 BLDV-SCNC: 32 MMOL/L — HIGH (ref 22–29)
HCT VFR BLD CALC: 45.8 % — SIGNIFICANT CHANGE UP (ref 37–47)
HCT VFR BLDA CALC: 39.7 % — SIGNIFICANT CHANGE UP (ref 34–44)
HCT VFR BLDA CALC: 46 % — HIGH (ref 34–44)
HGB BLD CALC-MCNC: 12.9 G/DL — LOW (ref 14–18)
HGB BLD CALC-MCNC: 15 G/DL — SIGNIFICANT CHANGE UP (ref 14–18)
HGB BLD-MCNC: 14.2 G/DL — SIGNIFICANT CHANGE UP (ref 12–16)
IMM GRANULOCYTES NFR BLD AUTO: 0.4 % — HIGH (ref 0.1–0.3)
INR BLD: 1.18 RATIO — SIGNIFICANT CHANGE UP (ref 0.65–1.3)
LACTATE BLDV-MCNC: 1 MMOL/L — SIGNIFICANT CHANGE UP (ref 0.5–1.6)
LACTATE BLDV-MCNC: 4 MMOL/L — HIGH (ref 0.5–1.6)
LYMPHOCYTES # BLD AUTO: 46.2 % — SIGNIFICANT CHANGE UP (ref 20.5–51.1)
LYMPHOCYTES # BLD AUTO: 6.2 K/UL — HIGH (ref 1.2–3.4)
MCHC RBC-ENTMCNC: 29.4 PG — SIGNIFICANT CHANGE UP (ref 27–31)
MCHC RBC-ENTMCNC: 31 G/DL — LOW (ref 32–37)
MCV RBC AUTO: 94.8 FL — SIGNIFICANT CHANGE UP (ref 81–99)
MONOCYTES # BLD AUTO: 1.13 K/UL — HIGH (ref 0.1–0.6)
MONOCYTES NFR BLD AUTO: 8.4 % — SIGNIFICANT CHANGE UP (ref 1.7–9.3)
NEUTROPHILS # BLD AUTO: 5.55 K/UL — SIGNIFICANT CHANGE UP (ref 1.4–6.5)
NEUTROPHILS NFR BLD AUTO: 41.5 % — LOW (ref 42.2–75.2)
NRBC # BLD: 0 /100 WBCS — SIGNIFICANT CHANGE UP (ref 0–0)
NT-PROBNP SERPL-SCNC: 2742 PG/ML — HIGH (ref 0–300)
PCO2 BLDV: 54 MMHG — HIGH (ref 41–51)
PCO2 BLDV: 88 MMHG — HIGH (ref 41–51)
PH BLDV: 7.16 — LOW (ref 7.26–7.43)
PH BLDV: 7.34 — SIGNIFICANT CHANGE UP (ref 7.26–7.43)
PLATELET # BLD AUTO: 254 K/UL — SIGNIFICANT CHANGE UP (ref 130–400)
PO2 BLDV: 27 MMHG — SIGNIFICANT CHANGE UP (ref 20–40)
PO2 BLDV: 335 MMHG — HIGH (ref 20–40)
POTASSIUM BLDV-SCNC: 3.9 MMOL/L — SIGNIFICANT CHANGE UP (ref 3.3–5.6)
POTASSIUM BLDV-SCNC: 4.1 MMOL/L — SIGNIFICANT CHANGE UP (ref 3.3–5.6)
POTASSIUM SERPL-MCNC: 4.2 MMOL/L — SIGNIFICANT CHANGE UP (ref 3.5–5)
POTASSIUM SERPL-SCNC: 4.2 MMOL/L — SIGNIFICANT CHANGE UP (ref 3.5–5)
PROT SERPL-MCNC: 7.9 G/DL — SIGNIFICANT CHANGE UP (ref 6–8)
PROTHROM AB SERPL-ACNC: 13.5 SEC — HIGH (ref 9.95–12.87)
RBC # BLD: 4.83 M/UL — SIGNIFICANT CHANGE UP (ref 4.2–5.4)
RBC # FLD: 14.2 % — SIGNIFICANT CHANGE UP (ref 11.5–14.5)
SAO2 % BLDV: 100 % — SIGNIFICANT CHANGE UP
SAO2 % BLDV: 31 % — SIGNIFICANT CHANGE UP
SODIUM SERPL-SCNC: 136 MMOL/L — SIGNIFICANT CHANGE UP (ref 135–146)
TROPONIN T SERPL-MCNC: <0.01 NG/ML — SIGNIFICANT CHANGE UP
WBC # BLD: 13.41 K/UL — HIGH (ref 4.8–10.8)
WBC # FLD AUTO: 13.41 K/UL — HIGH (ref 4.8–10.8)

## 2019-11-16 PROCEDURE — 93280 PM DEVICE PROGR EVAL DUAL: CPT | Mod: 26

## 2019-11-16 PROCEDURE — 71045 X-RAY EXAM CHEST 1 VIEW: CPT | Mod: 26

## 2019-11-16 PROCEDURE — 93010 ELECTROCARDIOGRAM REPORT: CPT

## 2019-11-16 PROCEDURE — 99291 CRITICAL CARE FIRST HOUR: CPT | Mod: GC

## 2019-11-16 RX ORDER — INFLUENZA VIRUS VACCINE 15; 15; 15; 15 UG/.5ML; UG/.5ML; UG/.5ML; UG/.5ML
0.5 SUSPENSION INTRAMUSCULAR ONCE
Refills: 0 | Status: DISCONTINUED | OUTPATIENT
Start: 2019-11-16 | End: 2019-11-19

## 2019-11-16 RX ORDER — FUROSEMIDE 40 MG
40 TABLET ORAL ONCE
Refills: 0 | Status: COMPLETED | OUTPATIENT
Start: 2019-11-16 | End: 2019-11-16

## 2019-11-16 RX ORDER — NITROGLYCERIN 6.5 MG
200 CAPSULE, EXTENDED RELEASE ORAL
Qty: 50 | Refills: 0 | Status: DISCONTINUED | OUTPATIENT
Start: 2019-11-16 | End: 2019-11-17

## 2019-11-16 RX ORDER — IPRATROPIUM/ALBUTEROL SULFATE 18-103MCG
3 AEROSOL WITH ADAPTER (GRAM) INHALATION
Refills: 0 | Status: DISCONTINUED | OUTPATIENT
Start: 2019-11-16 | End: 2019-11-16

## 2019-11-16 RX ADMIN — Medication 40 MILLIGRAM(S): at 21:13

## 2019-11-16 RX ADMIN — Medication 60 MICROGRAM(S)/MIN: at 19:31

## 2019-11-16 NOTE — ED PROVIDER NOTE - CLINICAL SUMMARY MEDICAL DECISION MAKING FREE TEXT BOX
I have personally performed a history and physical exam on this patient and personally directed the management of the patient. Patient evaluated for respiratory distress secondary to acute pulmonary edema, patient give non invasive ventilation and medical therapy, much improved, admitted for further eval and observation

## 2019-11-16 NOTE — ED ADULT NURSE REASSESSMENT NOTE - NS ED NURSE REASSESS COMMENT FT1
pt arrive visibly sob, staff made pt aware that it would be beneficial to intubate her. pt and daughter refused intubation, pt and daughter want to try thy the bipap even after reeducation of intubation

## 2019-11-16 NOTE — ED PROVIDER NOTE - NS ED ROS FT
GEN:  no fever, no chills  NEURO:  no headache, no dizziness  ENT: no sore throat, no runny nose  CV:  no chest pain, no palpitations  RESP:  + sob, + orthopnea, no cough  GI:  no nausea, no vomiting, no abdominal pain, no diarrhea  :  no dysuria, no urinary frequency, no hematuria  MSK:  no joint pain, no edema  SKIN:  no rash, no bruising  HEME: no hematochezia, no melena

## 2019-11-16 NOTE — ED PROVIDER NOTE - CRITICAL CARE PROVIDED
additional history taking/interpretation of diagnostic studies/documentation/consultation with other physicians/conducted a detailed discussion of DNR status/direct patient care (not related to procedure)

## 2019-11-16 NOTE — ED PROVIDER NOTE - ATTENDING CONTRIBUTION TO CARE
80 yo F with PMHx of DM, HTN, hypothyroidism, liver transplant, MI s/p CABG who presents with acute onset of severe sob which started at rest 1 hr prior to arrival. Patient denies chets pain, no hemoptysis, no loc. Patient found to have immediate bilateral B lines on bedside US, patient started on appropriate medical therapy and non invasive ventilation.     CONSTITUTIONAL: + Respiratory distress  SKIN: skin exam is warm and dry, no acute rash.  HEAD: Normocephalic; atraumatic.  EYES: PERRL, 3 mm bilateral, no nystagmus, EOM intact; conjunctiva and sclera clear.  ENT: No nasal discharge; airway clear.  NECK: Supple; non tender. + full passive ROM in all directions. No JVD  CARD: S1, S2 normal; no murmurs, gallops, or rubs. Regular rate and rhythm. + Symmetric Strong Pulses  RESP: + Bibasilar rales and expiratory wheeze, Bedside US displays + B lines, Improving air flow  ABD: soft; non-distended; non-tender. No Rebound, No Guarding, No signs of peritonitis, No CVA tenderness. No pulsatile abdominal mass. + Strong and Symmetric Pulses  EXT: Normal ROM. No clubbing, cyanosis 1+ edema. Dp and Pt Pulses intact. Cap refill less than 3 seconds  NEURO: Alert, oriented, grossly unremarkable. No Focal deficits. GCS 15. NIH 0  PSYCH: Cooperative, appropriate.

## 2019-11-16 NOTE — ED ADULT NURSE NOTE - NSIMPLEMENTINTERV_GEN_ALL_ED
Implemented All Fall with Harm Risk Interventions:  [place] to call system. Call bell, personal items and telephone within reach. Instruct patient to call for assistance. Room bathroom lighting operational. Non-slip footwear when patient is off stretcher. Physically safe environment: no spills, clutter or unnecessary equipment. Stretcher in lowest position, wheels locked, appropriate side rails in place. Provide visual cue, wrist band, yellow gown, etc. Monitor gait and stability. Monitor for mental status changes and reorient to person, place, and time. Review medications for side effects contributing to fall risk. Reinforce activity limits and safety measures with patient and family. Provide visual clues: red socks.

## 2019-11-16 NOTE — ED PROVIDER NOTE - OBJECTIVE STATEMENT
80 yo F with PMHx of DM, HTN, hypothyroidism, liver transplant, MI s/p CABG who presents with acute onset of severe sob which started at rest 1 hr prior to arrival. Worse with lying flat, improved with sitting forward. Upon EMS arrival, pt satting 90% on RA and refusing all other vitals/ekg. No fever, cough, cp, leg swelling, calf pain. Pt underwent CABG on 10/2/19 with Dr. Mirza and was d/c'ed to [facility]. No hx of smoking or COPD.     PMD: Dr. Pollard  Cardio: Dr. Portillo  CTS: Dr. Mirza

## 2019-11-16 NOTE — ED CLERICAL - NS ED CLERK NOTE PRE-ARRIVAL INFORMATION; ADDITIONAL PRE-ARRIVAL INFORMATION
This patient is enrolled in the [facility] A readmission reduction initiative and has active care navigation. This patient can be followed up by the care navigation team within 24 hours.  To arrange close follow-up or to obtain additional clinical information, please call the contact number above.

## 2019-11-16 NOTE — ED PROVIDER NOTE - PSH
H/O heart artery stent  X5  Liver transplanted    Pacemaker  [employer]- last interogated 1 m ago  S/P CABG (coronary artery bypass graft)

## 2019-11-16 NOTE — ED PROVIDER NOTE - PHYSICAL EXAMINATION
CONSTITUTIONAL: well developed, nontoxic appearing, in moderate respiratory distress, speaking in 2-3 word sentences  SKIN: warm, dry, no rash, cap refill < 2 seconds  HEENT: normocephalic, atraumatic, no conjunctival erythema, moist mucous membranes, patent airway  NECK: supple, no masses  CV:  tachycardic rate, 2+ radial pulses bilaterally  RESP: poor air entry bilaterally, increased work of breathing, tachypneic, tripoding   ABD: soft, nontender, nondistended, no rebound, no guarding  MSK: normal ROM, no cyanosis, no edema  NEURO: alert, oriented, grossly unremarkable  PSYCH: anxious, tearful

## 2019-11-16 NOTE — ED ADULT NURSE NOTE - PSH
H/O heart artery stent  X5  Liver transplanted    Pacemaker  [employer]- last interogated 1 m ago  S/P CABG (coronary artery bypass graft)

## 2019-11-16 NOTE — ED PROVIDER NOTE - PROGRESS NOTE DETAILS
TC: TC: 80 yo F with PMHx of DM, HTN, hypothyroidism, liver transplant, MI s/p recent CABG who presents with acute onset of sob x 1 hr. Tachycardic 110s here, RR 40s-50s, satting 95% on NRB, hypertensive SBP 200s with MAP > 150. Placed on bipap. Ordered labs, ekg, cxr. POCUS shows diffuse B-lines and bilateral pleural effusions. Cxr shows vascular congestion. pH 7.18 with pCO2 88. Pt's daughter refusing intubation at this time, requesting noninvasive management. Started on NTG gtt with improvement in BP and sx. Pt appears improved now, states that she feels better. Will continue to reassess. TC: Spoke with cardio fellow who recommended lasix and then call back after reassessment. TC: UOP 40 cc after lasix. Weaned off of nitro gtt. Still on BPAP. HR improved to 80s, /51, RR 20s, appears much more improved. Repeat vbg with improved pH 7.34 and pCO2 55, lactate improved from 4 to 1. Spoke with cardio fellow who recommended tele given that pt is much improved.

## 2019-11-17 LAB
ALBUMIN SERPL ELPH-MCNC: 4 G/DL — SIGNIFICANT CHANGE UP (ref 3.5–5.2)
ALP SERPL-CCNC: 78 U/L — SIGNIFICANT CHANGE UP (ref 30–115)
ALT FLD-CCNC: 8 U/L — SIGNIFICANT CHANGE UP (ref 0–41)
ANION GAP SERPL CALC-SCNC: 15 MMOL/L — HIGH (ref 7–14)
AST SERPL-CCNC: 16 U/L — SIGNIFICANT CHANGE UP (ref 0–41)
BASOPHILS # BLD AUTO: 0 K/UL — SIGNIFICANT CHANGE UP (ref 0–0.2)
BASOPHILS NFR BLD AUTO: 0 % — SIGNIFICANT CHANGE UP (ref 0–1)
BILIRUB SERPL-MCNC: 0.4 MG/DL — SIGNIFICANT CHANGE UP (ref 0.2–1.2)
BUN SERPL-MCNC: 20 MG/DL — SIGNIFICANT CHANGE UP (ref 10–20)
CALCIUM SERPL-MCNC: 10 MG/DL — SIGNIFICANT CHANGE UP (ref 8.5–10.1)
CHLORIDE SERPL-SCNC: 98 MMOL/L — SIGNIFICANT CHANGE UP (ref 98–110)
CK MB CFR SERPL CALC: 6 NG/ML — SIGNIFICANT CHANGE UP (ref 0.6–6.3)
CK SERPL-CCNC: 43 U/L — SIGNIFICANT CHANGE UP (ref 0–225)
CO2 SERPL-SCNC: 26 MMOL/L — SIGNIFICANT CHANGE UP (ref 17–32)
CREAT SERPL-MCNC: 1 MG/DL — SIGNIFICANT CHANGE UP (ref 0.7–1.5)
EOSINOPHIL # BLD AUTO: 0 K/UL — SIGNIFICANT CHANGE UP (ref 0–0.7)
EOSINOPHIL NFR BLD AUTO: 0 % — SIGNIFICANT CHANGE UP (ref 0–8)
GLUCOSE BLDC GLUCOMTR-MCNC: 205 MG/DL — HIGH (ref 70–99)
GLUCOSE BLDC GLUCOMTR-MCNC: 291 MG/DL — HIGH (ref 70–99)
GLUCOSE SERPL-MCNC: 224 MG/DL — HIGH (ref 70–99)
HCT VFR BLD CALC: 37.2 % — SIGNIFICANT CHANGE UP (ref 37–47)
HGB BLD-MCNC: 11.9 G/DL — LOW (ref 12–16)
IMM GRANULOCYTES NFR BLD AUTO: 0.6 % — HIGH (ref 0.1–0.3)
LYMPHOCYTES # BLD AUTO: 0.73 K/UL — LOW (ref 1.2–3.4)
LYMPHOCYTES # BLD AUTO: 13.7 % — LOW (ref 20.5–51.1)
MAGNESIUM SERPL-MCNC: 1.8 MG/DL — SIGNIFICANT CHANGE UP (ref 1.8–2.4)
MCHC RBC-ENTMCNC: 29.1 PG — SIGNIFICANT CHANGE UP (ref 27–31)
MCHC RBC-ENTMCNC: 32 G/DL — SIGNIFICANT CHANGE UP (ref 32–37)
MCV RBC AUTO: 91 FL — SIGNIFICANT CHANGE UP (ref 81–99)
MONOCYTES # BLD AUTO: 0.13 K/UL — SIGNIFICANT CHANGE UP (ref 0.1–0.6)
MONOCYTES NFR BLD AUTO: 2.4 % — SIGNIFICANT CHANGE UP (ref 1.7–9.3)
NEUTROPHILS # BLD AUTO: 4.45 K/UL — SIGNIFICANT CHANGE UP (ref 1.4–6.5)
NEUTROPHILS NFR BLD AUTO: 83.3 % — HIGH (ref 42.2–75.2)
NRBC # BLD: 0 /100 WBCS — SIGNIFICANT CHANGE UP (ref 0–0)
PLATELET # BLD AUTO: 166 K/UL — SIGNIFICANT CHANGE UP (ref 130–400)
POTASSIUM SERPL-MCNC: 4.5 MMOL/L — SIGNIFICANT CHANGE UP (ref 3.5–5)
POTASSIUM SERPL-SCNC: 4.5 MMOL/L — SIGNIFICANT CHANGE UP (ref 3.5–5)
PROT SERPL-MCNC: 6.8 G/DL — SIGNIFICANT CHANGE UP (ref 6–8)
RBC # BLD: 4.09 M/UL — LOW (ref 4.2–5.4)
RBC # FLD: 14 % — SIGNIFICANT CHANGE UP (ref 11.5–14.5)
SODIUM SERPL-SCNC: 139 MMOL/L — SIGNIFICANT CHANGE UP (ref 135–146)
TROPONIN T SERPL-MCNC: 0.06 NG/ML — CRITICAL HIGH
WBC # BLD: 5.34 K/UL — SIGNIFICANT CHANGE UP (ref 4.8–10.8)
WBC # FLD AUTO: 5.34 K/UL — SIGNIFICANT CHANGE UP (ref 4.8–10.8)

## 2019-11-17 PROCEDURE — 99223 1ST HOSP IP/OBS HIGH 75: CPT | Mod: AI

## 2019-11-17 RX ORDER — IPRATROPIUM/ALBUTEROL SULFATE 18-103MCG
3 AEROSOL WITH ADAPTER (GRAM) INHALATION EVERY 4 HOURS
Refills: 0 | Status: DISCONTINUED | OUTPATIENT
Start: 2019-11-17 | End: 2019-11-19

## 2019-11-17 RX ORDER — FUROSEMIDE 40 MG
40 TABLET ORAL DAILY
Refills: 0 | Status: COMPLETED | OUTPATIENT
Start: 2019-11-17 | End: 2019-11-19

## 2019-11-17 RX ORDER — CHOLECALCIFEROL (VITAMIN D3) 125 MCG
1 CAPSULE ORAL
Qty: 0 | Refills: 0 | DISCHARGE

## 2019-11-17 RX ORDER — DEXTROSE 50 % IN WATER 50 %
25 SYRINGE (ML) INTRAVENOUS ONCE
Refills: 0 | Status: DISCONTINUED | OUTPATIENT
Start: 2019-11-17 | End: 2019-11-19

## 2019-11-17 RX ORDER — ALPRAZOLAM 0.25 MG
1 TABLET ORAL
Qty: 0 | Refills: 0 | DISCHARGE

## 2019-11-17 RX ORDER — FAMOTIDINE 10 MG/ML
20 INJECTION INTRAVENOUS
Refills: 0 | Status: DISCONTINUED | OUTPATIENT
Start: 2019-11-17 | End: 2019-11-19

## 2019-11-17 RX ORDER — ENOXAPARIN SODIUM 100 MG/ML
40 INJECTION SUBCUTANEOUS DAILY
Refills: 0 | Status: DISCONTINUED | OUTPATIENT
Start: 2019-11-17 | End: 2019-11-19

## 2019-11-17 RX ORDER — DEXTROSE 50 % IN WATER 50 %
12.5 SYRINGE (ML) INTRAVENOUS ONCE
Refills: 0 | Status: DISCONTINUED | OUTPATIENT
Start: 2019-11-17 | End: 2019-11-19

## 2019-11-17 RX ORDER — INSULIN LISPRO 100/ML
VIAL (ML) SUBCUTANEOUS
Refills: 0 | Status: DISCONTINUED | OUTPATIENT
Start: 2019-11-17 | End: 2019-11-19

## 2019-11-17 RX ORDER — INSULIN GLARGINE 100 [IU]/ML
12 INJECTION, SOLUTION SUBCUTANEOUS AT BEDTIME
Refills: 0 | Status: DISCONTINUED | OUTPATIENT
Start: 2019-11-17 | End: 2019-11-19

## 2019-11-17 RX ORDER — LEVOTHYROXINE SODIUM 125 MCG
112 TABLET ORAL DAILY
Refills: 0 | Status: DISCONTINUED | OUTPATIENT
Start: 2019-11-17 | End: 2019-11-19

## 2019-11-17 RX ORDER — METOPROLOL TARTRATE 50 MG
25 TABLET ORAL
Refills: 0 | Status: DISCONTINUED | OUTPATIENT
Start: 2019-11-17 | End: 2019-11-19

## 2019-11-17 RX ORDER — ZOLPIDEM TARTRATE 10 MG/1
5 TABLET ORAL AT BEDTIME
Refills: 0 | Status: DISCONTINUED | OUTPATIENT
Start: 2019-11-17 | End: 2019-11-19

## 2019-11-17 RX ORDER — POLYETHYLENE GLYCOL 3350 17 G/17G
17 POWDER, FOR SOLUTION ORAL DAILY
Refills: 0 | Status: DISCONTINUED | OUTPATIENT
Start: 2019-11-17 | End: 2019-11-19

## 2019-11-17 RX ORDER — ALPRAZOLAM 0.25 MG
0.25 TABLET ORAL DAILY
Refills: 0 | Status: DISCONTINUED | OUTPATIENT
Start: 2019-11-17 | End: 2019-11-19

## 2019-11-17 RX ORDER — INSULIN GLARGINE 100 [IU]/ML
12 INJECTION, SOLUTION SUBCUTANEOUS ONCE
Refills: 0 | Status: COMPLETED | OUTPATIENT
Start: 2019-11-17 | End: 2019-11-17

## 2019-11-17 RX ORDER — INSULIN LISPRO 100/ML
4 VIAL (ML) SUBCUTANEOUS
Refills: 0 | Status: DISCONTINUED | OUTPATIENT
Start: 2019-11-17 | End: 2019-11-19

## 2019-11-17 RX ORDER — CHOLECALCIFEROL (VITAMIN D3) 125 MCG
2000 CAPSULE ORAL DAILY
Refills: 0 | Status: DISCONTINUED | OUTPATIENT
Start: 2019-11-17 | End: 2019-11-19

## 2019-11-17 RX ORDER — GLUCAGON INJECTION, SOLUTION 0.5 MG/.1ML
1 INJECTION, SOLUTION SUBCUTANEOUS ONCE
Refills: 0 | Status: DISCONTINUED | OUTPATIENT
Start: 2019-11-17 | End: 2019-11-19

## 2019-11-17 RX ORDER — DEXTROSE 50 % IN WATER 50 %
15 SYRINGE (ML) INTRAVENOUS ONCE
Refills: 0 | Status: DISCONTINUED | OUTPATIENT
Start: 2019-11-17 | End: 2019-11-19

## 2019-11-17 RX ORDER — TACROLIMUS 5 MG/1
0.5 CAPSULE ORAL DAILY
Refills: 0 | Status: DISCONTINUED | OUTPATIENT
Start: 2019-11-17 | End: 2019-11-19

## 2019-11-17 RX ORDER — VALSARTAN 80 MG/1
1 TABLET ORAL
Qty: 0 | Refills: 0 | DISCHARGE

## 2019-11-17 RX ORDER — SODIUM CHLORIDE 9 MG/ML
1000 INJECTION, SOLUTION INTRAVENOUS
Refills: 0 | Status: DISCONTINUED | OUTPATIENT
Start: 2019-11-17 | End: 2019-11-19

## 2019-11-17 RX ORDER — ZOLPIDEM TARTRATE 10 MG/1
1 TABLET ORAL
Qty: 0 | Refills: 0 | DISCHARGE

## 2019-11-17 RX ORDER — ATORVASTATIN CALCIUM 80 MG/1
10 TABLET, FILM COATED ORAL AT BEDTIME
Refills: 0 | Status: DISCONTINUED | OUTPATIENT
Start: 2019-11-17 | End: 2019-11-19

## 2019-11-17 RX ORDER — ASPIRIN/CALCIUM CARB/MAGNESIUM 324 MG
325 TABLET ORAL DAILY
Refills: 0 | Status: DISCONTINUED | OUTPATIENT
Start: 2019-11-17 | End: 2019-11-19

## 2019-11-17 RX ORDER — DILTIAZEM HCL 120 MG
240 CAPSULE, EXT RELEASE 24 HR ORAL DAILY
Refills: 0 | Status: DISCONTINUED | OUTPATIENT
Start: 2019-11-17 | End: 2019-11-19

## 2019-11-17 RX ADMIN — ATORVASTATIN CALCIUM 10 MILLIGRAM(S): 80 TABLET, FILM COATED ORAL at 21:55

## 2019-11-17 RX ADMIN — Medication 325 MILLIGRAM(S): at 12:14

## 2019-11-17 RX ADMIN — TACROLIMUS 0.5 MILLIGRAM(S): 5 CAPSULE ORAL at 12:14

## 2019-11-17 RX ADMIN — Medication 40 MILLIGRAM(S): at 17:22

## 2019-11-17 RX ADMIN — FAMOTIDINE 20 MILLIGRAM(S): 10 INJECTION INTRAVENOUS at 17:22

## 2019-11-17 RX ADMIN — ENOXAPARIN SODIUM 40 MILLIGRAM(S): 100 INJECTION SUBCUTANEOUS at 12:15

## 2019-11-17 RX ADMIN — Medication 40 MILLIGRAM(S): at 01:16

## 2019-11-17 RX ADMIN — Medication 25 MILLIGRAM(S): at 05:54

## 2019-11-17 RX ADMIN — Medication 40 MILLIGRAM(S): at 05:54

## 2019-11-17 RX ADMIN — Medication 25 MILLIGRAM(S): at 17:22

## 2019-11-17 RX ADMIN — FAMOTIDINE 20 MILLIGRAM(S): 10 INJECTION INTRAVENOUS at 05:54

## 2019-11-17 RX ADMIN — Medication 2000 UNIT(S): at 12:15

## 2019-11-17 RX ADMIN — Medication 112 MICROGRAM(S): at 05:54

## 2019-11-17 RX ADMIN — INSULIN GLARGINE 12 UNIT(S): 100 INJECTION, SOLUTION SUBCUTANEOUS at 23:42

## 2019-11-17 RX ADMIN — Medication 240 MILLIGRAM(S): at 05:54

## 2019-11-17 NOTE — CONSULT NOTE ADULT - SUBJECTIVE AND OBJECTIVE BOX
Patient is a 79y old  Female who presents with a chief complaint of acute SOB (17 Nov 2019 00:17)    HPI:  78 yo F with hx of CAD s/p PCI with 5 stents and recent CABG (Oct 2, 2019) (on PPM), post-op A.fib (after CABG), Chronic Hep-B complicated by HCC s/p liver transplant (10 years ago), HTN, DM II, Hypothyroidism presents to ED with acute onset of SOB 1 hour prior to presentation. Patient was at her daughter's house and suddenly feels severe SOB while sitting. So the daughter called the EMS and brought her to ED. Patient reports that she never had similar episodes in the past. Denied fever, chills, recent infection, chest pain, palpitation, abdominal pain, change in bowel movement and change in urination. Patient reports that she is compliance with her medications and she hasn't been drinking much.     Patient was recently discharged to [facility] from the hospital after CABG. Patient reports that she never had heart attack in the past, was doing fine until she was found to have severe 3V CAD on recent cardiac cath which she went for CABG in Oct 2019.    Of note:   Patient was never dx with COPD in the past. However patient was found to have FEV1 35% pre-operatively before CABG which consistent with severe COPD, as per CTS note.     Patient is a former heavy smoker, quitted about 12 years ago. (17 Nov 2019 00:17)    Cardiology fellow addendum:  Pt was seen and examined last night. pt felt much better after nitro infusion (weaned off), Bipap and lasix in ED. currently in telemetry on bipap, breathing comfortably. pt states that she was on Torsemide 50mg for many years but recently she has been on 20 mg torsemide at NH.       ROS:  All other systems reviewed and are negative    PAST MEDICAL & SURGICAL HISTORY  HCC (hepatocellular carcinoma)  History of hepatitis B  CAD (coronary artery disease)  HTN (hypertension)  SOB (shortness of breath)  Hypothyroid  Liver transplant status  Diabetes  S/P CABG (coronary artery bypass graft)  H/O heart artery stent: X5  Pacemaker: medtronic- last interogated 1 m ago  Liver transplanted      FAMILY HISTORY:  FAMILY HISTORY:  Family history of early CAD  FH: cancer      SOCIAL HISTORY:  []smoker  []Alcohol  []Drug    ALLERGIES:  No Known Drug Allergies  TEGADERM (Rash)      MEDICATIONS:  MEDICATIONS  (STANDING):  aspirin enteric coated 325 milliGRAM(s) Oral daily  atorvastatin 10 milliGRAM(s) Oral at bedtime  cholecalciferol 2000 Unit(s) Oral daily  diltiazem    milliGRAM(s) Oral daily  enoxaparin Injectable 40 milliGRAM(s) SubCutaneous daily  famotidine    Tablet 20 milliGRAM(s) Oral two times a day  furosemide   Injectable 40 milliGRAM(s) IV Push daily  influenza   Vaccine 0.5 milliLiter(s) IntraMuscular once  levothyroxine 112 MICROGram(s) Oral daily  methylPREDNISolone sodium succinate Injectable 40 milliGRAM(s) IV Push two times a day  metoprolol tartrate 25 milliGRAM(s) Oral two times a day  tacrolimus 0.5 milliGRAM(s) Oral daily    MEDICATIONS  (PRN):  albuterol/ipratropium for Nebulization 3 milliLiter(s) Nebulizer every 4 hours PRN Shortness of Breath and/or Wheezing  ALPRAZolam 0.25 milliGRAM(s) Oral daily PRN anxiety  polyethylene glycol 3350 17 Gram(s) Oral daily PRN Constipation  zolpidem 5 milliGRAM(s) Oral at bedtime PRN Insomnia      HOME MEDICATIONS:  Home Medications:  Ambien 5 mg oral tablet: 1 tab(s) orally once a day (at bedtime), As Needed (17 Nov 2019 01:56)  aspirin 325 mg oral delayed release tablet: 1 tab(s) orally once a day (18 Oct 2019 15:56)  atorvastatin 10 mg oral tablet: 1 tab(s) orally once a day (at bedtime) (18 Oct 2019 15:56)  dilTIAZem 240 mg/24 hours oral capsule, extended release: 1 cap(s) orally once a day (hold if BP &lt; 110 and HR &lt; 65) (17 Nov 2019 01:52)  docusate sodium 100 mg oral capsule: 1 cap(s) orally 3 times a day (18 Oct 2019 15:56)  insulin aspart: 4 UNITS subcutaneous 3 times a day (before meals) BASED ON BLOOD SUGAR (24 Sep 2019 10:23)  insulin glargine: 12 unit(s) subcutaneous once a day (at bedtime) (17 Nov 2019 01:53)  levothyroxine 112 mcg (0.112 mg) oral tablet: 1 tab(s) orally once a day (18 Oct 2019 15:56)  metoprolol tartrate 25 mg oral tablet: 1 tab(s) orally 2 times a day (18 Oct 2019 15:56)  Pepcid 20 mg oral tablet: 1 tab(s) orally 2 times a day (18 Oct 2019 15:56)  polyethylene glycol 3350 oral powder for reconstitution: 17 gram(s) orally once a day, As Needed (17 Nov 2019 01:55)  tacrolimus 0.5 mg oral capsule: 1 cap(s) orally once a day (18 Oct 2019 15:56)  torsemide 20 mg oral tablet: 1 tab(s) orally once a day (17 Nov 2019 02:03)  valsartan 80 mg oral tablet: 1 tab(s) orally once a day (hold if BP &lt; 110) (17 Nov 2019 01:55)  Vitamin D3 2000 intl units oral tablet: 1 tab(s) orally once a day (17 Nov 2019 01:57)  Xanax 0.25 mg oral tablet: 1 tab(s) orally once a day, As Needed (17 Nov 2019 01:56)      VITALS:   T(F): 96 (11-17 @ 05:59), Max: 96 (11-17 @ 05:59)  HR: 83 (11-17 @ 05:59) (70 - 119)  BP: 172/76 (11-17 @ 05:59) (155/88 - 204/107)  BP(mean): --  RR: 17 (11-17 @ 05:59) (17 - 24)  SpO2: 100% (11-16 @ 19:30) (90% - 100%)    I&O's Summary      PHYSICAL EXAM:  GEN: Alert and oriented X 3, No acute distress  HEENT: no pallor  NECK: Supple, no JVD  LUNGS: R>L basilar crackles   CARDIOVASCULAR: S1/S2 regular, no murmurs or rubs  ABD: Soft, BS+  EXT: mild ankle edema   NEURO: AAOx3     LABS:                        14.2   13.41 )-----------( 254      ( 16 Nov 2019 19:22 )             45.8     11-16    136  |  94<L>  |  18  ----------------------------<  278<H>  4.2   |  24  |  1.2    Ca    10.2<H>      16 Nov 2019 19:22    TPro  7.9  /  Alb  4.5  /  TBili  0.3  /  DBili  x   /  AST  17  /  ALT  10  /  AlkPhos  92  11-16    PT/INR - ( 16 Nov 2019 19:22 )   PT: 13.50 sec;   INR: 1.18 ratio         PTT - ( 16 Nov 2019 19:22 )  PTT:23.9 sec  Troponin T, Serum: <0.01 ng/mL (11-16-19 @ 19:22)    CARDIAC MARKERS ( 16 Nov 2019 19:22 )  x     / <0.01 ng/mL / x     / x     / x            Troponin trend:    Serum Pro-Brain Natriuretic Peptide: 2742 pg/mL (11-16-19 @ 19:22)    10-01 Chol 104 LDL 59 HDL 46<L> Trig 56  Hemoglobin A1C   Thyroid      RADIOLOGY:      -TTE:  < from: Transthoracic Echocardiogram (10.04.19 @ 08:40) >    Summary:   1. Normal global left ventricular systolic function.   2. Thickening of the anterior and posterior mitral valve leaflets.   3. Moderate tricuspid regurgitation.    < end of copied text >    -CATHETERIZATION:  < from: Cardiac Cath Lab - Adult (10.01.19 @ 07:22) >  CORONARY CIRCULATION: There was 3-vessel coronary artery disease (LAD, RCA,    and circumflex). Left main: Normal. LAD: The vessel was medium to large    sized. There were two major diagonal branches. Ostial LAD: There was a    discrete 80 % stenosis at a site with no prior intervention. Mid LAD:    Angiography showed moderate atherosclerosis with a 70% stenosis after the    first septal . Distal LAD: Angiography showed mild    atherosclerosis with no flow limiting lesions. 1st diagonal: There was a    discrete 90 % stenosis at a site with no prior intervention, at the ostium    of the vessel segment. 2nd diagonal: The vessel was very small sized.    Angiography showed severe atherosclerosis. Circumflex: The vessel was    medium sized. Proximal circumflex: There was a tubular 70 % stenosis at    the site of a prior stent. Mid circumflex: There was a diffuse 50 %    stenosis. RCA: The vessel was large sized (dominant). Ostial RCA: There    was a discrete 80 % stenosis at a site with no prior intervention. There    was DAYNA grade 3 flow through the vessel (brisk flow). Mid RCA:    Angiography showed mild atherosclerosis with no flow limiting lesions.    Distal RCA: Angiography showed mild atherosclerosis with no flow limiting    lesions. Right PDA: The vessel was small to medium sized. There was a    discrete 99 % stenosis.    < end of copied text >      ECG:  SR, lateral ischemia

## 2019-11-17 NOTE — H&P ADULT - NSICDXPASTSURGICALHX_GEN_ALL_CORE_FT
PAST SURGICAL HISTORY:  H/O heart artery stent X5    Liver transplanted     Pacemaker [employer]- last interogated 1 m ago    S/P CABG (coronary artery bypass graft) PAST SURGICAL HISTORY:  H/O heart artery stent X5    Liver transplanted     Pacemaker [employer]- last interogated 1 m ago    S/P CABG (coronary artery bypass graft)

## 2019-11-17 NOTE — H&P ADULT - NSHPPHYSICALEXAM_GEN_ALL_CORE
PHYSICAL EXAM:  GEN: No acute distress  HEENT: EOMI. No sclera icterus. PEERLA.   LUNGS:   HEART: S1/S2 present. RRR. No murmur/gallop/rubs.   ABD: Soft, non-tender, non-distended. Bowel sounds present  EXT: pitting edema.   NEURO: AAOX3. Non focal. PHYSICAL EXAM:  GEN: No acute distress.   HEENT: EOMI. No sclera icterus. PEERLA.   LUNGS: + crackles b/l lung fields (especially lower lung). + mild expiratory wheeze b/l lung field.   HEART: S1/S2 present. RRR. No murmur/gallop/rubs.   ABD: Soft, non-tender, non-distended. Bowel sounds present  EXT: No pitting edema.   NEURO: AAOX3. Non focal.

## 2019-11-17 NOTE — H&P ADULT - NSHPSOCIALHISTORY_GEN_ALL_CORE
Alcohol -  Smoke -  Illicit drug - Alcohol - denied.   Smoke - former smoker (quitted 12 years ago)  Illicit drug -denied.

## 2019-11-17 NOTE — H&P ADULT - ASSESSMENT
80 yo F with hx of CAD/MI s/p PCI with stents and recent CABG (Oct 2, 2019) (currently on PPM), post-op A.fib (after CABG), Chronic Hep-B complicated by HCC s/p liver transplant (10 years ago), HTN, DM II, Hypothyroidism presents to ED with acute onset of SOB 1 hour prior to presentation.    Of note:   patient was never dx with COPD in the past. However patient was found to have FEV1 35% pre-operatively before CABG which consistent with severe COPD.           Acute hypercapnic hypoxic respiratory failure likely 2/2 acute CHF exacerbation in the setting of COPD exacerbation  - CXR: increase pulmonary congestion with b/l pul edema --> follow up with official report  - s/p IV Lasix 40mg and solumedrol 125mg x 1 in ED  - c/w IV Lasix 40mg q24h (assess respiratory and volume status daily)  - c/w solumedrol 60mg q8h   - c/w BiPAP for now (NPO while on BiPAP)  - c/w albuterol q4h and prn   - monitor strict I & O, daily weight. Fluid restriction 1L per day with Low Na diet.   - check 2d Echo   - trend cardiac enzymes   - repeat CXR in AM.   - admit to Telemetry.   - Patient will need PFT outpatient.     HTN  - c/w     DM II  - c/w insulin regimen     Liver transplant   - c/w     Hypothyroidism  - c/w synthroid   - check TSH      DVT ppx: Lovenox SC  GI ppx: PPI   Activity: increase as tolerated.   Diet: NPO for now. If resume --> carb consistent DASH diet with fluid restriction 1L per day. 78 yo F with hx of CAD/MI s/p PCI with stents and recent CABG (Oct 2, 2019) (currently on PPM), post-op A.fib (after CABG), Chronic Hep-B complicated by HCC s/p liver transplant (10 years ago), HTN, DM II, Hypothyroidism presents to ED with acute onset of SOB 1 hour prior to presentation.    Of note:   patient was never dx with COPD in the past. However patient was found to have FEV1 35% pre-operatively before CABG which consistent with severe COPD.           Acute hypercapnic hypoxic respiratory failure likely 2/2 acute CHF exacerbation in the setting of COPD exacerbation  - CXR: increase pulmonary congestion with b/l pul edema --> follow up with official report  - s/p IV Lasix 40mg and solumedrol 125mg x 1 in ED  - c/w IV Lasix 40mg q24h (assess respiratory and volume status daily)  - c/w solumedrol 40mg BID    - c/w BiPAP for now (NPO while on BiPAP)  - c/w albuterol q4h prn   - monitor strict I & O, daily weight. Fluid restriction 1L per day with Low Na diet.   - check 2d Echo   - trend cardiac enzymes   - repeat CXR in AM.   - admit to Telemetry.   - Patient will need PFT outpatient.     HTN  - c/w home meds.     DM II  - c/w insulin regimen  - monitor FS      Hx of chronic Hep B complicated by HCC   s/p liver transplant   - c/w home meds     Hypothyroidism  - c/w synthroid   - check TSH    HLD  - c/w statin       DVT ppx: Lovenox SC  GI ppx: Pepcid   Activity: increase as tolerated.   Diet: NPO for now. If resume --> carb consistent DASH diet with fluid restriction 1L per day.

## 2019-11-17 NOTE — H&P ADULT - NSHPREVIEWOFSYSTEMS_GEN_ALL_CORE

## 2019-11-17 NOTE — CONSULT NOTE ADULT - ASSESSMENT
78 yo F with hx of CAD s/p PCI with 5 stents and recent quintuple CABG (Oct 2, 2019) (on PPM), post-op A.fib (after CABG), Chronic Hep-B complicated by HCC s/p liver transplant (10 years ago), HTN, DM II, Hypothyroidism presents to ED with acute onset of SOB 1 hour prior to presentation.     Impression:  Acute exacerbation diastolic CHF: unclear etiology of exacerbation; possibly due to suboptimal diuretic dosage  Significantly improved with Bipap, Lasix and brief nitro infusion in ED    Recommendation:  IV lasix 40 mg BID  BiPap prn  monitor Is&Os, low sodium diet  monitor BMP  repeat CE, ECHO  cont other home meds   will increase Torsemide dose upon discharge   monitor on tele for now

## 2019-11-17 NOTE — H&P ADULT - ATTENDING COMMENTS
80 yo F with hx of CAD s/p PCI with 5 stents and recent CABG (Oct 2, 2019) (on PPM), post-op A.fib (after CABG), Chronic Hep-B complicated by HCC s/p liver transplant (10 years ago), HTN, DM II, Hypothyroidism presents to ED with acute onset of SOB 1 hour prior to presentation. Patient was at her daughter's house and suddenly feels severe SOB while sitting. So the daughter called the EMS and brought her to ED. Patient reports that she never had similar episodes in the past. Denied fever, chills, recent infection, chest pain, palpitation, abdominal pain, change in bowel movement and change in urination. Patient reports that she is compliance with her medications and she hasn't been drinking much.     Patient was recently discharged to [facility] from the hospital after CABG. Patient reports that she never had heart attack in the past, was doing fine until she was found to have severe 3V CAD on recent cardiac cath which she went for CABG in Oct 2019.    Of note:   Patient was never dx with COPD in the past. However patient was found to have FEV1 35% pre-operatively before CABG which consistent with severe COPD, as per CTS note.     Patient is a former heavy smoker, quitted about 12 years ago.     REVIEW OF SYSTEMS:  CONSTITUTIONAL: No weakness, fevers or chills  EYES/ENT: No visual changes;  No vertigo or throat pain   NECK: No pain or stiffness  RESPIRATORY: No cough, wheezing, hemoptysis; (+) shortness of breath  CARDIOVASCULAR: No chest pain or palpitations, (+) elevated BP  GASTROINTESTINAL: No abdominal or epigastric pain. No nausea, vomiting, or hematemesis; No diarrhea or constipation. No melena or hematochezia.  GENITOURINARY: No dysuria, frequency or hematuria  NEUROLOGICAL: No numbness or weakness  SKIN: No itching, rashes    Physical Exam:  General: WN/WD NAD, on Bipap  Neurology: A&Ox3, nonfocal, follows commands  Eyes: PERRLA/ EOMI  ENT/Neck: Neck supple, trachea midline, No JVD  Respiratory: B/L basilar rales, No wheezing, rhonchi, mild tachypnea on Bipap  CV: Normal rate regular rhythm, S1S2, no murmurs, rubs or gallops  Abdominal: Soft, NT, ND +BS,   Extremities: No edema, + peripheral pulses  Skin: No Rashes, Hematoma, Ecchymosis  Incisions: n/a  Tubes: n/a    A/P  Acute pulm edema / Acute HF?EF/ Hypertensive emergency ( BP responded to NTG and BiPAP)  -Admit to tele  - IV lasix / Is and Os and daily weights  -Bipap / O2   -serial Marlee and EKG  -check 2D echo / fasting lipids    HTN - presenting w/ emergency / APE  -as above     DM type II   - monitor F/S   -c/w Lantus / Lispro    Hypothyroidism/ Dyslipidemia   -c/w Synthroid   -c/w Statin    DVT prophylaxis 80 yo F with hx of CAD s/p PCI with 5 stents and recent CABG (Oct 2, 2019) (on PPM), post-op A.fib (after CABG), Chronic Hep-B complicated by HCC s/p liver transplant (10 years ago), HTN, DM II, Hypothyroidism presents to ED with acute onset of SOB 1 hour prior to presentation. Patient was at her daughter's house and suddenly feels severe SOB while sitting. So the daughter called the EMS and brought her to ED. Patient reports that she never had similar episodes in the past. Denied fever, chills, recent infection, chest pain, palpitation, abdominal pain, change in bowel movement and change in urination. Patient reports that she is compliance with her medications and she hasn't been drinking much.     Patient was recently discharged to [facility] from the hospital after CABG. Patient reports that she never had heart attack in the past, was doing fine until she was found to have severe 3V CAD on recent cardiac cath which she went for CABG in Oct 2019.    Of note:   Patient was never dx with COPD in the past. However patient was found to have FEV1 35% pre-operatively before CABG which consistent with severe COPD, as per CTS note.     Patient is a former heavy smoker, quitted about 12 years ago.     REVIEW OF SYSTEMS:  CONSTITUTIONAL: No weakness, fevers or chills  EYES/ENT: No visual changes;  No vertigo or throat pain   NECK: No pain or stiffness  RESPIRATORY: No cough, wheezing, hemoptysis; (+) shortness of breath  CARDIOVASCULAR: No chest pain or palpitations, (+) elevated BP  GASTROINTESTINAL: No abdominal or epigastric pain. No nausea, vomiting, or hematemesis; No diarrhea or constipation. No melena or hematochezia.  GENITOURINARY: No dysuria, frequency or hematuria  NEUROLOGICAL: No numbness or weakness  SKIN: No itching, rashes    Physical Exam:  General: WN/WD NAD, on Bipap  Neurology: A&Ox3, nonfocal, follows commands  Eyes: PERRLA/ EOMI  ENT/Neck: Neck supple, trachea midline, No JVD  Respiratory: B/L basilar rales, No wheezing, rhonchi, mild tachypnea on Bipap  CV: Normal rate regular rhythm, S1S2, no murmurs, rubs or gallops  Abdominal: Soft, NT, ND +BS,   Extremities: No edema, + peripheral pulses  Skin: No Rashes, Hematoma, Ecchymosis  Incisions: n/a  Tubes: n/a    A/P  Acute pulm edema / Acute HFpEF/ Hypertensive emergency ( BP responded to NTG and BiPAP)/ recent CABG/CAD  -Admit to tele  - IV lasix / Is and Os and daily weights  -Bipap / O2   -serial Marlee and EKGs  -check 2D echo / fasting lipids    HTN - presenting w/ emergency / APE  -as above     DM type II   - monitor F/S   -c/w Lantus / Lispro    Hypothyroidism/ Dyslipidemia   -c/w Synthroid   -c/w Statin    DVT prophylaxis

## 2019-11-17 NOTE — H&P ADULT - NSHPLABSRESULTS_GEN_ALL_CORE
14.2   13.41 )-----------( 254      ( 16 Nov 2019 19:22 )             45.8             11-16    136  |  94<L>  |  18  ----------------------------<  278<H>  4.2   |  24  |  1.2    Ca    10.2<H>      16 Nov 2019 19:22    TPro  7.9  /  Alb  4.5  /  TBili  0.3  /  DBili  x   /  AST  17  /  ALT  10  /  AlkPhos  92  11-16    LIVER FUNCTIONS - ( 16 Nov 2019 19:22 )  Alb: 4.5 g/dL / Pro: 7.9 g/dL / ALK PHOS: 92 U/L / ALT: 10 U/L / AST: 17 U/L / GGT: x                 PT/INR - ( 16 Nov 2019 19:22 )   PT: 13.50 sec;   INR: 1.18 ratio         PTT - ( 16 Nov 2019 19:22 )  PTT:23.9 sec  CARDIAC MARKERS ( 16 Nov 2019 19:22 )  x     / <0.01 ng/mL / x     / x     / x                Serum Pro-Brain Natriuretic Peptide: 2742 pg/mL (11-16-19 @ 19:22) 14.2   13.41 )-----------( 254      ( 16 Nov 2019 19:22 )             45.8             11-16    136  |  94<L>  |  18  ----------------------------<  278<H>  4.2   |  24  |  1.2    Ca    10.2<H>      16 Nov 2019 19:22    TPro  7.9  /  Alb  4.5  /  TBili  0.3  /  DBili  x   /  AST  17  /  ALT  10  /  AlkPhos  92  11-16    LIVER FUNCTIONS - ( 16 Nov 2019 19:22 )  Alb: 4.5 g/dL / Pro: 7.9 g/dL / ALK PHOS: 92 U/L / ALT: 10 U/L / AST: 17 U/L / GGT: x         PT/INR - ( 16 Nov 2019 19:22 )   PT: 13.50 sec;   INR: 1.18 ratio       PTT - ( 16 Nov 2019 19:22 )  PTT:23.9 sec  CARDIAC MARKERS ( 16 Nov 2019 19:22 )  x     / <0.01 ng/mL / x     / x     / x        Serum Pro-Brain Natriuretic Peptide: 2742 pg/mL (11-16-19 @ 19:22)

## 2019-11-17 NOTE — H&P ADULT - HISTORY OF PRESENT ILLNESS
78 yo F with hx of CAD/MI s/p PCI with stents and recent CABG (Oct 2, 2019) (currently on PPM), post-op A.fib (after CABG), Chronic Hep-B complicated by HCC s/p liver transplant (10 years ago), HTN, DM II, Hypothyroidism presents to ED with acute onset of SOB 1 hour prior to presentation.    Of note:   patient was never dx with COPD in the past. However patient was found to have FEV1 35% pre-operatively before CABG which consistent with severe COPD.         78 yo F with PMHx of DM, HTN, hypothyroidism, liver transplant, MI s/p CABG (Oct 2019) who presents with acute onset of severe sob which started at rest 1 hr prior to arrival. Worse with lying flat, improved with sitting forward. Upon EMS arrival, pt satting 90% on RA and refusing all other vitals/ekg. No fever, cough, cp, leg swelling, calf pain. Pt underwent CABG on 10/2/19 with Dr. Mirza and was d/c'ed to [facility]. No hx of smoking or COPD. 80 yo F with hx of CAD s/p PCI with 5 stents and recent CABG (Oct 2, 2019) (on PPM), post-op A.fib (after CABG), Chronic Hep-B complicated by HCC s/p liver transplant (10 years ago), HTN, DM II, Hypothyroidism presents to ED with acute onset of SOB 1 hour prior to presentation. Patient was at her daughter's house and suddenly feels severe SOB while sitting. So the daughter called the EMS and brought her to ED. Patient reports that she never had similar episodes in the past. Denied fever, chills, recent infection, chest pain, palpitation, abdominal pain, change in bowel movement and change in urination. Patient reports that she is compliance with her medications and she hasn't been drinking much.     Patient was recently discharged to [facility] from the hospital after CABG. Patient reports that she never had heart attack in the past, was doing fine until she was found to have severe 3V CAD on recent cardiac cath which she went for CABG in Oct 2019.    Of note:   Patient was never dx with COPD in the past. However patient was found to have FEV1 35% pre-operatively before CABG which consistent with severe COPD, as per CTS note.     Patient is a former heavy smoker, quitted about 12 years ago.

## 2019-11-17 NOTE — H&P ADULT - NSICDXPASTMEDICALHX_GEN_ALL_CORE_FT
PAST MEDICAL HISTORY:  Diabetes     HTN (hypertension)     Hypothyroid     Liver transplant status     Myocardial infarction     SOB (shortness of breath) PAST MEDICAL HISTORY:  CAD (coronary artery disease)     Diabetes     HCC (hepatocellular carcinoma)     History of hepatitis B     HTN (hypertension)     Hypothyroid     Liver transplant status     SOB (shortness of breath)

## 2019-11-18 ENCOUNTER — APPOINTMENT (OUTPATIENT)
Dept: CARDIOLOGY | Facility: CLINIC | Age: 79
End: 2019-11-18

## 2019-11-18 LAB
GLUCOSE BLDC GLUCOMTR-MCNC: 196 MG/DL — HIGH (ref 70–99)
GLUCOSE BLDC GLUCOMTR-MCNC: 196 MG/DL — HIGH (ref 70–99)
GLUCOSE BLDC GLUCOMTR-MCNC: 227 MG/DL — HIGH (ref 70–99)
GLUCOSE BLDC GLUCOMTR-MCNC: 271 MG/DL — HIGH (ref 70–99)
OB PNL STL: NEGATIVE — SIGNIFICANT CHANGE UP
TROPONIN T SERPL-MCNC: 0.03 NG/ML — CRITICAL HIGH
TSH SERPL-MCNC: 0.6 UIU/ML — SIGNIFICANT CHANGE UP (ref 0.27–4.2)

## 2019-11-18 PROCEDURE — 93306 TTE W/DOPPLER COMPLETE: CPT | Mod: 26

## 2019-11-18 PROCEDURE — 99233 SBSQ HOSP IP/OBS HIGH 50: CPT

## 2019-11-18 PROCEDURE — 71045 X-RAY EXAM CHEST 1 VIEW: CPT | Mod: 26

## 2019-11-18 RX ORDER — FUROSEMIDE 40 MG
40 TABLET ORAL
Refills: 0 | Status: DISCONTINUED | OUTPATIENT
Start: 2019-11-19 | End: 2019-11-19

## 2019-11-18 RX ORDER — LORATADINE 10 MG/1
10 TABLET ORAL ONCE
Refills: 0 | Status: COMPLETED | OUTPATIENT
Start: 2019-11-18 | End: 2019-11-18

## 2019-11-18 RX ORDER — INSULIN GLARGINE 100 [IU]/ML
8 INJECTION, SOLUTION SUBCUTANEOUS ONCE
Refills: 0 | Status: COMPLETED | OUTPATIENT
Start: 2019-11-18 | End: 2019-11-18

## 2019-11-18 RX ORDER — CHLORHEXIDINE GLUCONATE 213 G/1000ML
1 SOLUTION TOPICAL
Refills: 0 | Status: DISCONTINUED | OUTPATIENT
Start: 2019-11-18 | End: 2019-11-19

## 2019-11-18 RX ADMIN — TACROLIMUS 0.5 MILLIGRAM(S): 5 CAPSULE ORAL at 12:37

## 2019-11-18 RX ADMIN — FAMOTIDINE 20 MILLIGRAM(S): 10 INJECTION INTRAVENOUS at 18:01

## 2019-11-18 RX ADMIN — Medication 2: at 12:33

## 2019-11-18 RX ADMIN — INSULIN GLARGINE 12 UNIT(S): 100 INJECTION, SOLUTION SUBCUTANEOUS at 21:44

## 2019-11-18 RX ADMIN — LORATADINE 10 MILLIGRAM(S): 10 TABLET ORAL at 14:58

## 2019-11-18 RX ADMIN — Medication 3: at 09:28

## 2019-11-18 RX ADMIN — Medication 2000 UNIT(S): at 12:36

## 2019-11-18 RX ADMIN — Medication 4 UNIT(S): at 18:04

## 2019-11-18 RX ADMIN — ENOXAPARIN SODIUM 40 MILLIGRAM(S): 100 INJECTION SUBCUTANEOUS at 13:28

## 2019-11-18 RX ADMIN — FAMOTIDINE 20 MILLIGRAM(S): 10 INJECTION INTRAVENOUS at 05:20

## 2019-11-18 RX ADMIN — INSULIN GLARGINE 8 UNIT(S): 100 INJECTION, SOLUTION SUBCUTANEOUS at 22:06

## 2019-11-18 RX ADMIN — ATORVASTATIN CALCIUM 10 MILLIGRAM(S): 80 TABLET, FILM COATED ORAL at 21:44

## 2019-11-18 RX ADMIN — Medication 1: at 18:04

## 2019-11-18 RX ADMIN — Medication 40 MILLIGRAM(S): at 05:22

## 2019-11-18 RX ADMIN — Medication 240 MILLIGRAM(S): at 05:20

## 2019-11-18 RX ADMIN — Medication 4 UNIT(S): at 09:30

## 2019-11-18 RX ADMIN — Medication 25 MILLIGRAM(S): at 05:21

## 2019-11-18 RX ADMIN — Medication 40 MILLIGRAM(S): at 05:21

## 2019-11-18 RX ADMIN — Medication 112 MICROGRAM(S): at 05:20

## 2019-11-18 RX ADMIN — Medication 25 MILLIGRAM(S): at 18:02

## 2019-11-18 RX ADMIN — Medication 325 MILLIGRAM(S): at 12:36

## 2019-11-18 RX ADMIN — Medication 4 UNIT(S): at 12:33

## 2019-11-18 NOTE — PROGRESS NOTE ADULT - SUBJECTIVE AND OBJECTIVE BOX
NEPTALI ANNA 79y Female  MRN#: [id number]   CODE STATUS:________      SUBJECTIVE  Patient is a 79y old Female who presents with a chief complaint of acute SOB (18 Nov 2019 13:40)  Currently admitted to medicine with the primary diagnosis of Acute pulmonary edema    Today is hospital day 2d, and this morning she is resting in chair, no acute events reported overnight. Patient admits that her sob improved since presentation, however, she is not back to her baseline prior to her CABG. Repeat CXR improved. EP interrogated PPM, 2 episodes of a fib, cardiology f/u. She           OBJECTIVE  PAST MEDICAL & SURGICAL HISTORY  HCC (hepatocellular carcinoma)  History of hepatitis B  CAD (coronary artery disease)  HTN (hypertension)  SOB (shortness of breath)  Hypothyroid  Liver transplant status  Diabetes  S/P CABG (coronary artery bypass graft)  H/O heart artery stent: X5  Pacemaker: medtronic- last interogated 1 m ago  Liver transplanted    ALLERGIES:  No Known Drug Allergies  TEGADERM (Rash)    MEDICATIONS:  STANDING MEDICATIONS  aspirin enteric coated 325 milliGRAM(s) Oral daily  atorvastatin 10 milliGRAM(s) Oral at bedtime  chlorhexidine 4% Liquid 1 Application(s) Topical <User Schedule>  cholecalciferol 2000 Unit(s) Oral daily  dextrose 5%. 1000 milliLiter(s) IV Continuous <Continuous>  dextrose 50% Injectable 12.5 Gram(s) IV Push once  dextrose 50% Injectable 25 Gram(s) IV Push once  dextrose 50% Injectable 25 Gram(s) IV Push once  diltiazem    milliGRAM(s) Oral daily  enoxaparin Injectable 40 milliGRAM(s) SubCutaneous daily  famotidine    Tablet 20 milliGRAM(s) Oral two times a day  furosemide   Injectable 40 milliGRAM(s) IV Push daily  influenza   Vaccine 0.5 milliLiter(s) IntraMuscular once  insulin glargine Injectable (LANTUS) 12 Unit(s) SubCutaneous at bedtime  insulin lispro (HumaLOG) corrective regimen sliding scale   SubCutaneous three times a day before meals  insulin lispro Injectable (HumaLOG) 4 Unit(s) SubCutaneous three times a day before meals  levothyroxine 112 MICROGram(s) Oral daily  loratadine 10 milliGRAM(s) Oral once  metoprolol tartrate 25 milliGRAM(s) Oral two times a day  tacrolimus 0.5 milliGRAM(s) Oral daily    PRN MEDICATIONS  albuterol/ipratropium for Nebulization 3 milliLiter(s) Nebulizer every 4 hours PRN  ALPRAZolam 0.25 milliGRAM(s) Oral daily PRN  dextrose 40% Gel 15 Gram(s) Oral once PRN  glucagon  Injectable 1 milliGRAM(s) IntraMuscular once PRN  polyethylene glycol 3350 17 Gram(s) Oral daily PRN  zolpidem 5 milliGRAM(s) Oral at bedtime PRN      VITAL SIGNS: Last 24 Hours  T(C): 36.6 (18 Nov 2019 10:04), Max: 36.6 (18 Nov 2019 10:04)  T(F): 97.8 (18 Nov 2019 10:04), Max: 97.8 (18 Nov 2019 10:04)  HR: 66 (18 Nov 2019 10:04) (66 - 85)  BP: 108/61 (18 Nov 2019 10:04) (108/61 - 185/89)  BP(mean): --  RR: 18 (18 Nov 2019 10:04) (18 - 18)  SpO2: --    LABS:                        11.9   5.34  )-----------( 166      ( 17 Nov 2019 05:12 )             37.2     11-17    139  |  98  |  20  ----------------------------<  224<H>  4.5   |  26  |  1.0    Ca    10.0      17 Nov 2019 05:12  Mg     1.8     11-17    TPro  6.8  /  Alb  4.0  /  TBili  0.4  /  DBili  x   /  AST  16  /  ALT  8   /  AlkPhos  78  11-17    PT/INR - ( 16 Nov 2019 19:22 )   PT: 13.50 sec;   INR: 1.18 ratio         PTT - ( 16 Nov 2019 19:22 )  PTT:23.9 sec          CARDIAC MARKERS ( 17 Nov 2019 05:12 )  x     / 0.06 ng/mL / 43 U/L / x     / 6.0 ng/mL  CARDIAC MARKERS ( 16 Nov 2019 19:22 )  x     / <0.01 ng/mL / x     / x     / x          RADIOLOGY:      PHYSICAL EXAM:    GENERAL: NAD, well-developed, AAOx3  HEENT:  Atraumatic, Normocephalic. EOMI, PERRLA, conjunctiva and sclera clear, No JVD  PULMONARY: Clear to auscultation bilaterally; No wheeze  CARDIOVASCULAR: Regular rate and rhythm; No murmurs, rubs, or gallops  GASTROINTESTINAL: Soft, Nontender, Nondistended; Bowel sounds present  MUSCULOSKELETAL:  2+ Peripheral Pulses, No clubbing, cyanosis, or edema  NEUROLOGY: non-focal  SKIN: No rashes or lesions NEPTALI ANNA 79y Female  MRN#: [id number]   CODE STATUS:________      SUBJECTIVE  Patient is a 79y old Female who presents with a chief complaint of acute SOB (18 Nov 2019 13:40)  Currently admitted to medicine with the primary diagnosis of Acute pulmonary edema    Today is hospital day 2d, and this morning she is resting in chair, no acute events reported overnight. Patient admits that her sob improved since presentation, however, she is not back to her baseline prior to her CABG. Repeat CXR improved. EP interrogated PPM, 2 episodes of a fib, cardiology f/u. She otherwise denies any acute sob, cp, n/v/d, or voiding abnormalities.           OBJECTIVE  PAST MEDICAL & SURGICAL HISTORY  HCC (hepatocellular carcinoma)  History of hepatitis B  CAD (coronary artery disease)  HTN (hypertension)  SOB (shortness of breath)  Hypothyroid  Liver transplant status  Diabetes  S/P CABG (coronary artery bypass graft)  H/O heart artery stent: X5  Pacemaker: medtronic- last interogated 1 m ago  Liver transplanted    ALLERGIES:  No Known Drug Allergies  TEGADERM (Rash)    MEDICATIONS:  STANDING MEDICATIONS  aspirin enteric coated 325 milliGRAM(s) Oral daily  atorvastatin 10 milliGRAM(s) Oral at bedtime  chlorhexidine 4% Liquid 1 Application(s) Topical <User Schedule>  cholecalciferol 2000 Unit(s) Oral daily  dextrose 5%. 1000 milliLiter(s) IV Continuous <Continuous>  dextrose 50% Injectable 12.5 Gram(s) IV Push once  dextrose 50% Injectable 25 Gram(s) IV Push once  dextrose 50% Injectable 25 Gram(s) IV Push once  diltiazem    milliGRAM(s) Oral daily  enoxaparin Injectable 40 milliGRAM(s) SubCutaneous daily  famotidine    Tablet 20 milliGRAM(s) Oral two times a day  furosemide   Injectable 40 milliGRAM(s) IV Push daily  influenza   Vaccine 0.5 milliLiter(s) IntraMuscular once  insulin glargine Injectable (LANTUS) 12 Unit(s) SubCutaneous at bedtime  insulin lispro (HumaLOG) corrective regimen sliding scale   SubCutaneous three times a day before meals  insulin lispro Injectable (HumaLOG) 4 Unit(s) SubCutaneous three times a day before meals  levothyroxine 112 MICROGram(s) Oral daily  loratadine 10 milliGRAM(s) Oral once  metoprolol tartrate 25 milliGRAM(s) Oral two times a day  tacrolimus 0.5 milliGRAM(s) Oral daily    PRN MEDICATIONS  albuterol/ipratropium for Nebulization 3 milliLiter(s) Nebulizer every 4 hours PRN  ALPRAZolam 0.25 milliGRAM(s) Oral daily PRN  dextrose 40% Gel 15 Gram(s) Oral once PRN  glucagon  Injectable 1 milliGRAM(s) IntraMuscular once PRN  polyethylene glycol 3350 17 Gram(s) Oral daily PRN  zolpidem 5 milliGRAM(s) Oral at bedtime PRN      VITAL SIGNS: Last 24 Hours  T(C): 36.6 (18 Nov 2019 10:04), Max: 36.6 (18 Nov 2019 10:04)  T(F): 97.8 (18 Nov 2019 10:04), Max: 97.8 (18 Nov 2019 10:04)  HR: 66 (18 Nov 2019 10:04) (66 - 85)  BP: 108/61 (18 Nov 2019 10:04) (108/61 - 185/89)  BP(mean): --  RR: 18 (18 Nov 2019 10:04) (18 - 18)  SpO2: --    LABS:                        11.9   5.34  )-----------( 166      ( 17 Nov 2019 05:12 )             37.2     11-17    139  |  98  |  20  ----------------------------<  224<H>  4.5   |  26  |  1.0    Ca    10.0      17 Nov 2019 05:12  Mg     1.8     11-17    TPro  6.8  /  Alb  4.0  /  TBili  0.4  /  DBili  x   /  AST  16  /  ALT  8   /  AlkPhos  78  11-17    PT/INR - ( 16 Nov 2019 19:22 )   PT: 13.50 sec;   INR: 1.18 ratio         PTT - ( 16 Nov 2019 19:22 )  PTT:23.9 sec          CARDIAC MARKERS ( 17 Nov 2019 05:12 )  x     / 0.06 ng/mL / 43 U/L / x     / 6.0 ng/mL  CARDIAC MARKERS ( 16 Nov 2019 19:22 )  x     / <0.01 ng/mL / x     / x     / x          RADIOLOGY:  < from: Xray Chest 1 View- PORTABLE-Routine (11.18.19 @ 06:18) >    IMPRESSION:     Improved bilateral opacities/effusions.      < end of copied text >      PHYSICAL EXAM:    GENERAL: NAD, well-developed, AAOx3  HEENT:  Atraumatic, Normocephalic. EOMI, PERRLA,   PULMONARY: Clear to auscultation bilaterally; No wheeze  CARDIOVASCULAR: Regular rate and rhythm; No murmurs, rubs, or gallops  GASTROINTESTINAL: Soft, Nontender, Nondistended; Bowel sounds present  MUSCULOSKELETAL:  2+ Peripheral Pulses, No edema on exam   NEUROLOGY: non-focal

## 2019-11-18 NOTE — CONSULT NOTE ADULT - ASSESSMENT
IMPRESSION: Rehab of   debility, s/P CABG    PRECAUTIONS: [x ] Cardiac  [  ] Respiratory  [  ] Seizures [  ] Contact Isolation  [  ] Droplet Isolation  [  ] Other    Weight Bearing Status:    Sternotomy precautions, no heavy push, pull, lift.    RECOMMENDATION:    Out of Bed to Chair     DVT/Decubiti Prophylaxis    REHAB PLAN:     [x   ] Bedside P/T 3-5 times a week   [   ]   Bedside O/T  2-3 times a week             [   ] No Rehab Therapy Indicated                   [   ]  Speech Therapy   Conditioning/ROM                                    ADL  Bed Mobility                                               Conditioning/ROM  Transfers                                                     Bed Mobility  Sitting /Standing Balance                         Transfers                                        Gait Training                                               Sitting/Standing Balance  Stair Training [   ]Applicable                    Home equipment Eval                                                                        Splinting  [   ] Only      GOALS:   ADL   [ x  ]   Independent                    Transfers  [ x  ] Independent                          Ambulation  [ x  ] Independent     [  x  ] With device                            [   ]  CG                                                         [   ]  CG                                                                  [   ] CG                            [    ] Min A                                                   [   ] Min A                                                              [   ] Min  A          DISCHARGE PLAN:   [   ]  Good candidate for Intensive Rehabilitation/Hospital based-[facility]                                             Will tolerate 3hrs Intensive Rehab Daily                                       [ xx   ]  return for Short Term Rehab in Skilled Nursing Facility                                       [    ]  Home with Outpatient or  services                                         [    ]  Possible Candidate for Intensive Hospital based Rehab

## 2019-11-18 NOTE — PROGRESS NOTE ADULT - ASSESSMENT
Acute pulm edema / Acute HFpEF mod to severe TR and MR/ Hypertensive emergency ( BP responded to NTG and BiPAP)/ recent CABG/CAD  c/w IV lasix 40 BID for now switch to po torsemide tomorrow   cardiology follow up   patient with trop 0.06 repeat   echo showed mod ot severe TR and MR   d/c steroids no need     HTN:   was high this am recheck after am meds given     DM type II   - monitor F/S   -c/w Lantus / Lispro    Hypothyroidism/ Dyslipidemia   -c/w Synthroid   -c/w Statin      liver transplant history c/w home meds     DVT prophylaxis . Acute pulm edema / Acute HFpEF mod to severe TR and MR/ Hypertensive emergency ( BP responded to NTG and BiPAP)/ recent CABG/CAD  c/w IV lasix 40 BID for now switch to po torsemide tomorrow   cardiology follow up   patient with trop 0.06 repeat   echo showed mod ot severe TR and MR   d/c steroids no need     HTN:   was high this am recheck after am meds given     DM type II   - monitor F/S   -c/w Lantus / Lispro    Hypothyroidism/ Dyslipidemia   -c/w Synthroid   -c/w Statin      liver transplant history c/w home meds     suspected vitamin d deficiency c/w supplement    DVT prophylaxis .

## 2019-11-18 NOTE — DIETITIAN INITIAL EVALUATION ADULT. - DIET TYPE
Pt. reports good appetite, however does not like food at NH and barely eats. Pt. was admitted at [facility] in October and went to NH after d/c. Pt. reports drinking 3 bottles of Glucerna daily at NH and some emals provided by family, therefore able to maintain weight. UBW ~142lbs per pt. NKFA. Takes vit D supplement. No cultural/[employer] dietary restr (prefers family cooking). Pt. states she likes the food at the hospital and eats better at this time. No diet orders in EMR, however receiving trays. Provide consistent carb w/snack, DASH/TLC diet

## 2019-11-18 NOTE — CONSULT NOTE ADULT - SUBJECTIVE AND OBJECTIVE BOX
HPI:  80 yo F with hx of CAD s/p PCI with 5 stents and recent CABG (Oct 2, 2019) (on PPM), post-op A.fib (after CABG), Chronic Hep-B complicated by HCC s/p liver transplant (10 years ago), HTN, DM II, Hypothyroidism presents to ED with acute onset of SOB 1 hour prior to presentation. Patient was at her daughter's house and suddenly feels severe SOB while sitting. So the daughter called the EMS and brought her to ED. Patient reports that she never had similar episodes in the past. Denied fever, chills, recent infection, chest pain, palpitation, abdominal pain, change in bowel movement and change in urination. Patient reports that she is compliance with her medications and she hasn't been drinking much.     Patient was recently discharged to [facility] from the hospital after CABG. Patient reports that she never had heart attack in the past, was doing fine until she was found to have severe 3V CAD on recent cardiac cath which she went for CABG in Oct 2019.    Of note:   Patient was never dx with COPD in the past. However patient was found to have FEV1 35% pre-operatively before CABG which consistent with severe COPD, as per CTS note.     Patient is a former heavy smoker, quitted about 12 years ago. (17 Nov 2019 00:17)      PAST MEDICAL & SURGICAL HISTORY:  HCC (hepatocellular carcinoma)  History of hepatitis B  CAD (coronary artery disease)  HTN (hypertension)  SOB (shortness of breath)  Hypothyroid  Liver transplant status  Diabetes  S/P CABG (coronary artery bypass graft)  H/O heart artery stent: X5  Pacemaker: medtronic- last interogated 1 m ago  Liver transplanted      Hospital Course:  Hospitlized with CHF. She is deconditioned and weak. she is not able to fly back to [facility] with her recent CABG at this time.  TODAY'S SUBJECTIVE & REVIEW OF SYMPTOMS:     Constitutional WNL   Cardio WNL   Resp WNL   GI WNL  Heme WNL  Endo WNL  Skin WNL  MSK WNL  Neuro WNL  Cognitive WNL  Psych WNL      MEDICATIONS  (STANDING):  aspirin enteric coated 325 milliGRAM(s) Oral daily  atorvastatin 10 milliGRAM(s) Oral at bedtime  chlorhexidine 4% Liquid 1 Application(s) Topical <User Schedule>  cholecalciferol 2000 Unit(s) Oral daily  dextrose 5%. 1000 milliLiter(s) (50 mL/Hr) IV Continuous <Continuous>  dextrose 50% Injectable 12.5 Gram(s) IV Push once  dextrose 50% Injectable 25 Gram(s) IV Push once  dextrose 50% Injectable 25 Gram(s) IV Push once  diltiazem    milliGRAM(s) Oral daily  enoxaparin Injectable 40 milliGRAM(s) SubCutaneous daily  famotidine    Tablet 20 milliGRAM(s) Oral two times a day  furosemide   Injectable 40 milliGRAM(s) IV Push daily  influenza   Vaccine 0.5 milliLiter(s) IntraMuscular once  insulin glargine Injectable (LANTUS) 12 Unit(s) SubCutaneous at bedtime  insulin lispro (HumaLOG) corrective regimen sliding scale   SubCutaneous three times a day before meals  insulin lispro Injectable (HumaLOG) 4 Unit(s) SubCutaneous three times a day before meals  levothyroxine 112 MICROGram(s) Oral daily  metoprolol tartrate 25 milliGRAM(s) Oral two times a day  tacrolimus 0.5 milliGRAM(s) Oral daily    MEDICATIONS  (PRN):  albuterol/ipratropium for Nebulization 3 milliLiter(s) Nebulizer every 4 hours PRN Shortness of Breath and/or Wheezing  ALPRAZolam 0.25 milliGRAM(s) Oral daily PRN anxiety  dextrose 40% Gel 15 Gram(s) Oral once PRN Blood Glucose LESS THAN 70 milliGRAM(s)/deciliter  glucagon  Injectable 1 milliGRAM(s) IntraMuscular once PRN Glucose LESS THAN 70 milligrams/deciliter  polyethylene glycol 3350 17 Gram(s) Oral daily PRN Constipation  zolpidem 5 milliGRAM(s) Oral at bedtime PRN Insomnia      FAMILY HISTORY:  Family history of early CAD  FH: cancer      Allergies    No Known Drug Allergies  TEGADERM (Rash)    Intolerances        SOCIAL HISTORY:    [  ] Etoh  [  ] Smoking  [  ] Substance abuse     Home Environment:  [  ] Home Alone  [  ] Lives in [facility] ; has family on [place]  [  ] Home Health Aid    Dwelling:  [  ] Apartment  [  ] Private House  [  ] Adult Home  [ x ] Skilled Nursing Facility      [ x ] Short Term  [  ] Long Term  [  ] Stairs       Elevator [  ]    FUNCTIONAL STATUS PTA: (Check all that apply)  Ambulation: [   ]Independent    [  ] Dependent     [  ] Non-Ambulatory  Assistive Device: [  ] SA Cane  [  ]  Q Cane  [  ] Walker  [  ]  Wheelchair  ADL : [  ] Independent  [  ]  Dependent       Vital Signs Last 24 Hrs  T(C): 36.6 (18 Nov 2019 10:04), Max: 36.6 (18 Nov 2019 10:04)  T(F): 97.8 (18 Nov 2019 10:04), Max: 97.8 (18 Nov 2019 10:04)  HR: 66 (18 Nov 2019 10:04) (66 - 85)  BP: 108/61 (18 Nov 2019 10:04) (108/61 - 185/89)  BP(mean): --  RR: 18 (18 Nov 2019 10:04) (18 - 18)  SpO2: --      PHYSICAL EXAM: Alert & Oriented X3  GENERAL: NAD, well-groomed, well-developed  HEAD:  Atraumatic, Normocephalic  EYES: EOMI, PERRLA, conjunctiva and sclera clear  NECK: Supple, No JVD, Normal thyroid  CHEST/LUNG: Clear to percussion bilaterally; No rales, rhonchi, wheezing, or rubs  HEART: Regular rate and rhythm; No murmurs, rubs, or gallops  ABDOMEN: Soft, Nontender, Nondistended; Bowel sounds present  EXTREMITIES:  2+ Peripheral Pulses, No clubbing, cyanosis, or edema    NERVOUS SYSTEM:  Cranial Nerves 2-12 intact [  ] Abnormal  [  ]  ROM: WFL all extremities [  ]  Abnormal [  ]  Motor Strength: WFL all extremities  [  ]  Abnormal [ x ]  LE's 5-/5 power  Sensation: intact to light touch [  ] Abnormal [  ]  Reflexes: Symmetric [  ]  Abnormal [  ]    FUNCTIONAL STATUS:  Bed Mobility: Independent [  ]  Supervision [  ]  Needs Assistance [  ]  N/A [  ]  Transfers: Independent [  ]  Supervision [  ]  Needs Assistance [  ]  N/A [  ]   Ambulation: Independent [  ]  Supervision [  ]  Needs Assistance [  ]  N/A [  ]  ADL: Independent [  ] Requires Assistance [  ] N/A [  ]      LABS:                        11.9   5.34  )-----------( 166      ( 17 Nov 2019 05:12 )             37.2     11-17    139  |  98  |  20  ----------------------------<  224<H>  4.5   |  26  |  1.0    Ca    10.0      17 Nov 2019 05:12  Mg     1.8     11-17    TPro  6.8  /  Alb  4.0  /  TBili  0.4  /  DBili  x   /  AST  16  /  ALT  8   /  AlkPhos  78  11-17    PT/INR - ( 16 Nov 2019 19:22 )   PT: 13.50 sec;   INR: 1.18 ratio         PTT - ( 16 Nov 2019 19:22 )  PTT:23.9 sec      RADIOLOGY & ADDITIONAL STUDIES:    Assesment:

## 2019-11-18 NOTE — PROGRESS NOTE ADULT - ASSESSMENT
80 yo F with hx of CAD/MI s/p PCI with stents and recent CABG (Oct 2, 2019) (currently on PPM), post-op A.fib (after CABG), Chronic Hep-B complicated by HCC s/p liver transplant (10 years ago), HTN, DM II, Hypothyroidism presents to ED with acute onset of SOB 1 hour prior to presentation.    Acute pulm edema / Acute HFpEF mod to severe TR and MR/ recent CABG/CAD  - c/w IV lasix 40 today  for now switch to PO lasix BID tomorrow   - CXR improved since presentation  - troponins 0.01 on admission-->0.006 11/17, f/u repeat troponin  - echo showed mod ot severe TR and MR   - PPM interrogated by EP, (spoke to EP over phone 2 episodes of A fib after cabg, once lasting 28 hours, cardiology follow up for possible anticoagulation).    HTN:   - 1 episode of elevated bp, stable in afternoon after medications  - c/w current medications    DM type II   - monitor F/S   -c/w Lantus / Lispro    Hypothyroidism/ Dyslipidemia   -c/w Synthroid   -c/w Statin      s/p liver transplant history   - c/w home meds     suspected vitamin d deficiency   - c/w supplement    DVT prophylaxis: Lovonox  Diet: DASH  Dispo: from NH, PT/rehab pending

## 2019-11-18 NOTE — PROGRESS NOTE ADULT - SUBJECTIVE AND OBJECTIVE BOX
patient SOB improved   no chest pain     Vital Signs Last 24 Hrs  T(C): 36.1 (18 Nov 2019 05:51), Max: 36.1 (18 Nov 2019 05:51)  T(F): 97 (18 Nov 2019 05:51), Max: 97 (18 Nov 2019 05:51)  HR: 85 (18 Nov 2019 05:51) (85 - 85)  BP: 185/89 (18 Nov 2019 05:51) (185/89 - 185/89)  BP(mean): --  RR: 18 (18 Nov 2019 05:51) (18 - 18)  SpO2: --    PHYSICAL EXAM:  GENERAL: NAD,  Pulm: B/L crackles   CV: Regular rate and rhythm; No murmurs, rubs, or gallops  ABDOMEN: Soft, Nontender, Nondistended; Bowel sounds present  EXTREMITIES:  +edema  PSYCH: AAOx3  NEUROLOGY: non-focal  SKIN: No rashes or lesions                          11.9   5.34  )-----------( 166      ( 17 Nov 2019 05:12 )             37.2     11-17    139  |  98  |  20  ----------------------------<  224<H>  4.5   |  26  |  1.0    Ca    10.0      17 Nov 2019 05:12  Mg     1.8     11-17    TPro  6.8  /  Alb  4.0  /  TBili  0.4  /  DBili  x   /  AST  16  /  ALT  8   /  AlkPhos  78  11-17    LIVER FUNCTIONS - ( 17 Nov 2019 05:12 )  Alb: 4.0 g/dL / Pro: 6.8 g/dL / ALK PHOS: 78 U/L / ALT: 8 U/L / AST: 16 U/L / GGT: x           PT/INR - ( 16 Nov 2019 19:22 )   PT: 13.50 sec;   INR: 1.18 ratio         PTT - ( 16 Nov 2019 19:22 )  PTT:23.9 sec  CARDIAC MARKERS ( 17 Nov 2019 05:12 )  x     / 0.06 ng/mL / 43 U/L / x     / 6.0 ng/mL  CARDIAC MARKERS ( 16 Nov 2019 19:22 )  x     / <0.01 ng/mL / x     / x     / x          MEDICATIONS  (STANDING):  aspirin enteric coated 325 milliGRAM(s) Oral daily  atorvastatin 10 milliGRAM(s) Oral at bedtime  chlorhexidine 4% Liquid 1 Application(s) Topical <User Schedule>  cholecalciferol 2000 Unit(s) Oral daily  dextrose 5%. 1000 milliLiter(s) (50 mL/Hr) IV Continuous <Continuous>  dextrose 50% Injectable 12.5 Gram(s) IV Push once  dextrose 50% Injectable 25 Gram(s) IV Push once  dextrose 50% Injectable 25 Gram(s) IV Push once  diltiazem    milliGRAM(s) Oral daily  enoxaparin Injectable 40 milliGRAM(s) SubCutaneous daily  famotidine    Tablet 20 milliGRAM(s) Oral two times a day  furosemide   Injectable 40 milliGRAM(s) IV Push daily  influenza   Vaccine 0.5 milliLiter(s) IntraMuscular once  insulin glargine Injectable (LANTUS) 12 Unit(s) SubCutaneous at bedtime  insulin lispro (HumaLOG) corrective regimen sliding scale   SubCutaneous three times a day before meals  insulin lispro Injectable (HumaLOG) 4 Unit(s) SubCutaneous three times a day before meals  levothyroxine 112 MICROGram(s) Oral daily  loratadine 10 milliGRAM(s) Oral once  methylPREDNISolone sodium succinate Injectable 40 milliGRAM(s) IV Push two times a day  metoprolol tartrate 25 milliGRAM(s) Oral two times a day  tacrolimus 0.5 milliGRAM(s) Oral daily    MEDICATIONS  (PRN):  albuterol/ipratropium for Nebulization 3 milliLiter(s) Nebulizer every 4 hours PRN Shortness of Breath and/or Wheezing  ALPRAZolam 0.25 milliGRAM(s) Oral daily PRN anxiety  dextrose 40% Gel 15 Gram(s) Oral once PRN Blood Glucose LESS THAN 70 milliGRAM(s)/deciliter  glucagon  Injectable 1 milliGRAM(s) IntraMuscular once PRN Glucose LESS THAN 70 milligrams/deciliter  polyethylene glycol 3350 17 Gram(s) Oral daily PRN Constipation  zolpidem 5 milliGRAM(s) Oral at bedtime PRN Insomnia

## 2019-11-18 NOTE — DIETITIAN INITIAL EVALUATION ADULT. - OTHER INFO
P/w: SOB. Acute exacerbation diastolic CHF: unclear etiology of exacerbation; possibly due to suboptimal diuretic dosage- improved with BiPAP. IV Lasix. Telemetry monitoring.

## 2019-11-19 ENCOUNTER — TRANSCRIPTION ENCOUNTER (OUTPATIENT)
Age: 79
End: 2019-11-19

## 2019-11-19 ENCOUNTER — APPOINTMENT (OUTPATIENT)
Dept: CARDIOTHORACIC SURGERY | Facility: CLINIC | Age: 79
End: 2019-11-19

## 2019-11-19 VITALS
RESPIRATION RATE: 18 BRPM | TEMPERATURE: 98 F | DIASTOLIC BLOOD PRESSURE: 54 MMHG | SYSTOLIC BLOOD PRESSURE: 124 MMHG | HEART RATE: 59 BPM

## 2019-11-19 LAB
ANION GAP SERPL CALC-SCNC: 13 MMOL/L — SIGNIFICANT CHANGE UP (ref 7–14)
BASOPHILS # BLD AUTO: 0.02 K/UL — SIGNIFICANT CHANGE UP (ref 0–0.2)
BASOPHILS NFR BLD AUTO: 0.2 % — SIGNIFICANT CHANGE UP (ref 0–1)
BUN SERPL-MCNC: 28 MG/DL — HIGH (ref 10–20)
CALCIUM SERPL-MCNC: 10.2 MG/DL — HIGH (ref 8.5–10.1)
CHLORIDE SERPL-SCNC: 99 MMOL/L — SIGNIFICANT CHANGE UP (ref 98–110)
CO2 SERPL-SCNC: 29 MMOL/L — SIGNIFICANT CHANGE UP (ref 17–32)
CREAT SERPL-MCNC: 0.9 MG/DL — SIGNIFICANT CHANGE UP (ref 0.7–1.5)
EOSINOPHIL # BLD AUTO: 0 K/UL — SIGNIFICANT CHANGE UP (ref 0–0.7)
EOSINOPHIL NFR BLD AUTO: 0 % — SIGNIFICANT CHANGE UP (ref 0–8)
GLUCOSE BLDC GLUCOMTR-MCNC: 103 MG/DL — HIGH (ref 70–99)
GLUCOSE BLDC GLUCOMTR-MCNC: 184 MG/DL — HIGH (ref 70–99)
GLUCOSE SERPL-MCNC: 209 MG/DL — HIGH (ref 70–99)
HCT VFR BLD CALC: 38.5 % — SIGNIFICANT CHANGE UP (ref 37–47)
HGB BLD-MCNC: 12.4 G/DL — SIGNIFICANT CHANGE UP (ref 12–16)
IMM GRANULOCYTES NFR BLD AUTO: 0.6 % — HIGH (ref 0.1–0.3)
LYMPHOCYTES # BLD AUTO: 1.82 K/UL — SIGNIFICANT CHANGE UP (ref 1.2–3.4)
LYMPHOCYTES # BLD AUTO: 14.6 % — LOW (ref 20.5–51.1)
MAGNESIUM SERPL-MCNC: 2.1 MG/DL — SIGNIFICANT CHANGE UP (ref 1.8–2.4)
MCHC RBC-ENTMCNC: 29.1 PG — SIGNIFICANT CHANGE UP (ref 27–31)
MCHC RBC-ENTMCNC: 32.2 G/DL — SIGNIFICANT CHANGE UP (ref 32–37)
MCV RBC AUTO: 90.4 FL — SIGNIFICANT CHANGE UP (ref 81–99)
MONOCYTES # BLD AUTO: 1.13 K/UL — HIGH (ref 0.1–0.6)
MONOCYTES NFR BLD AUTO: 9 % — SIGNIFICANT CHANGE UP (ref 1.7–9.3)
NEUTROPHILS # BLD AUTO: 9.45 K/UL — HIGH (ref 1.4–6.5)
NEUTROPHILS NFR BLD AUTO: 75.6 % — HIGH (ref 42.2–75.2)
NRBC # BLD: 0 /100 WBCS — SIGNIFICANT CHANGE UP (ref 0–0)
PLATELET # BLD AUTO: 188 K/UL — SIGNIFICANT CHANGE UP (ref 130–400)
POTASSIUM SERPL-MCNC: 4.2 MMOL/L — SIGNIFICANT CHANGE UP (ref 3.5–5)
POTASSIUM SERPL-SCNC: 4.2 MMOL/L — SIGNIFICANT CHANGE UP (ref 3.5–5)
RBC # BLD: 4.26 M/UL — SIGNIFICANT CHANGE UP (ref 4.2–5.4)
RBC # FLD: 14.4 % — SIGNIFICANT CHANGE UP (ref 11.5–14.5)
SODIUM SERPL-SCNC: 141 MMOL/L — SIGNIFICANT CHANGE UP (ref 135–146)
TROPONIN T SERPL-MCNC: 0.03 NG/ML — CRITICAL HIGH
WBC # BLD: 12.49 K/UL — HIGH (ref 4.8–10.8)
WBC # FLD AUTO: 12.49 K/UL — HIGH (ref 4.8–10.8)

## 2019-11-19 PROCEDURE — 99239 HOSP IP/OBS DSCHRG MGMT >30: CPT

## 2019-11-19 PROCEDURE — 99232 SBSQ HOSP IP/OBS MODERATE 35: CPT

## 2019-11-19 RX ORDER — INSULIN LISPRO 100/ML
6 VIAL (ML) SUBCUTANEOUS
Refills: 0 | Status: DISCONTINUED | OUTPATIENT
Start: 2019-11-19 | End: 2019-11-19

## 2019-11-19 RX ORDER — POLYETHYLENE GLYCOL 3350 17 G/17G
17 POWDER, FOR SOLUTION ORAL DAILY
Refills: 0 | Status: DISCONTINUED | OUTPATIENT
Start: 2019-11-19 | End: 2019-11-19

## 2019-11-19 RX ORDER — LOSARTAN POTASSIUM 100 MG/1
50 TABLET, FILM COATED ORAL DAILY
Refills: 0 | Status: DISCONTINUED | OUTPATIENT
Start: 2019-11-19 | End: 2019-11-19

## 2019-11-19 RX ORDER — INSULIN GLARGINE 100 [IU]/ML
13 INJECTION, SOLUTION SUBCUTANEOUS AT BEDTIME
Refills: 0 | Status: DISCONTINUED | OUTPATIENT
Start: 2019-11-19 | End: 2019-11-19

## 2019-11-19 RX ORDER — INSULIN LISPRO 100/ML
VIAL (ML) SUBCUTANEOUS
Refills: 0 | Status: DISCONTINUED | OUTPATIENT
Start: 2019-11-19 | End: 2019-11-19

## 2019-11-19 RX ADMIN — FAMOTIDINE 20 MILLIGRAM(S): 10 INJECTION INTRAVENOUS at 06:26

## 2019-11-19 RX ADMIN — Medication 240 MILLIGRAM(S): at 06:26

## 2019-11-19 RX ADMIN — Medication 25 MILLIGRAM(S): at 06:26

## 2019-11-19 RX ADMIN — Medication 2000 UNIT(S): at 12:22

## 2019-11-19 RX ADMIN — Medication 325 MILLIGRAM(S): at 12:22

## 2019-11-19 RX ADMIN — Medication 6 UNIT(S): at 12:21

## 2019-11-19 RX ADMIN — Medication 1: at 08:03

## 2019-11-19 RX ADMIN — TACROLIMUS 0.5 MILLIGRAM(S): 5 CAPSULE ORAL at 12:28

## 2019-11-19 RX ADMIN — Medication 112 MICROGRAM(S): at 06:26

## 2019-11-19 RX ADMIN — CHLORHEXIDINE GLUCONATE 1 APPLICATION(S): 213 SOLUTION TOPICAL at 06:27

## 2019-11-19 RX ADMIN — ENOXAPARIN SODIUM 40 MILLIGRAM(S): 100 INJECTION SUBCUTANEOUS at 12:23

## 2019-11-19 RX ADMIN — Medication 4 UNIT(S): at 08:03

## 2019-11-19 NOTE — PROGRESS NOTE ADULT - SUBJECTIVE AND OBJECTIVE BOX
NEPTALI ANNA 79y Female  MRN#: [id number]   CODE STATUS:________      SUBJECTIVE  Patient is a 79y old Female who presents with a chief complaint of acute SOB (19 Nov 2019 08:58)  Currently admitted to medicine with the primary diagnosis of Acute pulmonary edema    Today is hospital day 3d, and this morning she is resting in bed, reports no acute events overnight. Patient is preoccupied about her insulin dosage at the hospital compared to home, insulin dose readjusted, will continue to monitor FS. Per cardiology, patient is not a candidate for anticoagulation due to fall risk. Patient currently denies any sob, cp, dizziness, edema, or abdominal symptoms Awaiting SNF authorization          OBJECTIVE  PAST MEDICAL & SURGICAL HISTORY  HCC (hepatocellular carcinoma)  History of hepatitis B  CAD (coronary artery disease)  HTN (hypertension)  SOB (shortness of breath)  Hypothyroid  Liver transplant status  Diabetes  S/P CABG (coronary artery bypass graft)  H/O heart artery stent: X5  Pacemaker: medtronic- last interogated 1 m ago  Liver transplanted    ALLERGIES:  No Known Drug Allergies  TEGADERM (Rash)    MEDICATIONS:  STANDING MEDICATIONS  aspirin enteric coated 325 milliGRAM(s) Oral daily  atorvastatin 10 milliGRAM(s) Oral at bedtime  chlorhexidine 4% Liquid 1 Application(s) Topical <User Schedule>  cholecalciferol 2000 Unit(s) Oral daily  dextrose 5%. 1000 milliLiter(s) IV Continuous <Continuous>  dextrose 50% Injectable 12.5 Gram(s) IV Push once  dextrose 50% Injectable 25 Gram(s) IV Push once  diltiazem    milliGRAM(s) Oral daily  enoxaparin Injectable 40 milliGRAM(s) SubCutaneous daily  famotidine    Tablet 20 milliGRAM(s) Oral two times a day  furosemide    Tablet 40 milliGRAM(s) Oral two times a day  influenza   Vaccine 0.5 milliLiter(s) IntraMuscular once  insulin glargine Injectable (LANTUS) 13 Unit(s) SubCutaneous at bedtime  insulin lispro (HumaLOG) corrective regimen sliding scale   SubCutaneous three times a day before meals  insulin lispro Injectable (HumaLOG) 6 Unit(s) SubCutaneous before breakfast  insulin lispro Injectable (HumaLOG) 6 Unit(s) SubCutaneous before lunch  insulin lispro Injectable (HumaLOG) 6 Unit(s) SubCutaneous before dinner  levothyroxine 112 MICROGram(s) Oral daily  metoprolol tartrate 25 milliGRAM(s) Oral two times a day  polyethylene glycol 3350 17 Gram(s) Oral daily  tacrolimus 0.5 milliGRAM(s) Oral daily    PRN MEDICATIONS  albuterol/ipratropium for Nebulization 3 milliLiter(s) Nebulizer every 4 hours PRN  ALPRAZolam 0.25 milliGRAM(s) Oral daily PRN  dextrose 40% Gel 15 Gram(s) Oral once PRN  glucagon  Injectable 1 milliGRAM(s) IntraMuscular once PRN  zolpidem 5 milliGRAM(s) Oral at bedtime PRN      VITAL SIGNS: Last 24 Hours  T(C): 36.1 (19 Nov 2019 05:52), Max: 36.6 (18 Nov 2019 10:04)  T(F): 96.9 (19 Nov 2019 05:52), Max: 97.8 (18 Nov 2019 10:04)  HR: 61 (19 Nov 2019 05:52) (61 - 79)  BP: 171/79 (19 Nov 2019 05:52) (108/61 - 171/79)  BP(mean): --  RR: 18 (19 Nov 2019 05:52) (18 - 18)  SpO2: --    LABS:                        12.4   12.49 )-----------( 188      ( 19 Nov 2019 05:43 )             38.5     11-19    141  |  99  |  28<H>  ----------------------------<  209<H>  4.2   |  29  |  0.9    Ca    10.2<H>      19 Nov 2019 05:43  Mg     2.1     11-19      Troponin T, Serum: 0.03 ng/mL <HH> (11-19-19 @ 05:43)  Troponin T, Serum: 0.03 ng/mL <HH> (11-18-19 @ 17:43)      CARDIAC MARKERS ( 19 Nov 2019 05:43 )  x     / 0.03 ng/mL / x     / x     / x      CARDIAC MARKERS ( 18 Nov 2019 17:43 )  x     / 0.03 ng/mL / x     / x     / x          RADIOLOGY:      PHYSICAL EXAM:    GENERAL: NAD, well-developed, AAOx3  HEENT:  Atraumatic, Normocephalic. EOMI, PERRLA, No JVD,   CHEST: post cabg scar healed  PULMONARY: Clear to auscultation bilaterally; No wheeze  CARDIOVASCULAR: Regular rate and rhythm; No murmurs, rubs, or gallops  GASTROINTESTINAL: Soft, Nontender, Nondistended; Bowel sounds present  MUSCULOSKELETAL:  2+ Peripheral Pulses, No edema  NEUROLOGY: non-focal

## 2019-11-19 NOTE — DISCHARGE NOTE PROVIDER - CARE PROVIDERS DIRECT ADDRESSES
[email or web address]

## 2019-11-19 NOTE — DISCHARGE NOTE PROVIDER - HOSPITAL COURSE
78 yo F with hx of CAD s/p PCI with 5 stents and recent CABG (Oct 2, 2019) (on PPM), post-op A.fib (after CABG), Chronic Hep-B complicated by HCC s/p liver transplant (10 years ago), HTN, DM II, Hypothyroidism presents to ED with acute onset of SOB 1 hour prior to presentation. Patient was at her daughter's house and suddenly feels severe SOB while sitting. So the daughter called the EMS and brought her to ED. Patient reports that she never had similar episodes in the past. Denied fever, chills, recent infection, chest pain, palpitation, abdominal pain, change in bowel movement and change in urination. Patient reports that she is compliance with her medications and she hasn't been drinking much.         Patient was recently discharged to [facility] from the hospital after CABG. Patient reports that she never had heart attack in the past, was doing fine until she was found to have severe 3V CAD on recent cardiac cath which she went for CABG in Oct 2019. She was admitted for :        Acute pulm edema / Acute HFpEF mod to severe TR and MR/ recent CABG/CAD    - Continue PO lasix 40 mg BID daily    - CXR improved since presentation after appropriate diuresis    - troponins downtrended ti 0.03    - echo showed mod ot severe TR and MR     - PPM interrogated by EP, ( 2 episodes of A fib after cabg, once lasting 28 hours prior to CABG), however per Cardiology due to patient's fall risk, not a candidate for anticoagulation).         HTN:     - c/w current medication        DM type II     - c/w home regimen        Hypothyroidism/ Dyslipidemia     -c/w Synthroid     -c/w Statin        s/p liver transplant history     - c/w home meds         suspected vitamin d deficiency     - c/w supplement

## 2019-11-19 NOTE — PROGRESS NOTE ADULT - ASSESSMENT
Acute pulm edema / Acute HFpEF mod to severe TR and MR/ Hypertensive emergency ( BP responded to NTG and BiPAP)/ recent CABG/CAD:   improved s/p IV lasix   will switch to po torsemide patient was taking 20 mg qday at NH. will resume but at 40 mg qday as cardiology recommended to go up on torsemide dose.  patient cleared by cardiology for discharge     AFIB: per cardiology asa 325 . patient high risk for anticoagulation high fall risks and recommended not to anticoagulate . I spoke to patient about benefits of anticoagulation to prevent stroke from afib but also risks of life threatening bleeding since she is a high fall risk. patient does not want to be on anticoagulation       HTN:   no ideally controlled patient was taking valsartan as OPT will resume     DM type II   - monitor F/S   -c/w Lantus / Lispro    Hypothyroidism/ Dyslipidemia   -c/w Synthroid   -c/w Statin      liver transplant history c/w home meds     suspected vitamin d deficiency c/w supplement    DVT prophylaxis     discharge once SNF bed available  discharge today spent 35 min coordinating discharge

## 2019-11-19 NOTE — DISCHARGE NOTE PROVIDER - NSDCCPCAREPLAN_GEN_ALL_CORE_FT
PRINCIPAL DISCHARGE DIAGNOSIS  Diagnosis: Acute pulmonary edema  Assessment and Plan of Treatment: Your acute shortness of breath was due to fluid building up in your lungs due to Heart Failure. Your echocardiogram showed severe mitral valve regurgitation. Your pacemaker was interrogated and you were found to have two episodes of atrial fribillation prior to your CABG however due to your fall risk, you are not a candidate for anticoagulatin. Please continue to take your diuretic medication as prescribed and follow up with your PMD in two weeks.

## 2019-11-19 NOTE — PROGRESS NOTE ADULT - SUBJECTIVE AND OBJECTIVE BOX
no chest pain   SOB resolved   patient feels well     Vital Signs Last 24 Hrs  T(C): 36.1 (19 Nov 2019 05:52), Max: 36.1 (19 Nov 2019 05:52)  T(F): 96.9 (19 Nov 2019 05:52), Max: 96.9 (19 Nov 2019 05:52)  HR: 61 (19 Nov 2019 05:52) (61 - 79)  BP: 171/79 (19 Nov 2019 05:52) (122/61 - 171/79)  BP(mean): --  RR: 18 (19 Nov 2019 05:52) (18 - 18)  SpO2: --    PHYSICAL EXAM:  GENERAL: NAD, well-developed  HEAD:  Atraumatic, Normocephalic  EYES: EOMI, PERRLA, conjunctiva and sclera clear  NECK: Supple, No JVD  CHEST/LUNG: Clear to auscultation bilaterally; No wheeze  HEART: Regular rate and rhythm; No murmurs, rubs, or gallops  ABDOMEN: Soft, Nontender, Nondistended; Bowel sounds present  EXTREMITIES:  2+ Peripheral Pulses, No clubbing, cyanosis, or edema  PSYCH: AAOx3  NEUROLOGY: non-focal  SKIN: No rashes or lesions                          12.4   12.49 )-----------( 188      ( 19 Nov 2019 05:43 )             38.5     11-19    141  |  99  |  28<H>  ----------------------------<  209<H>  4.2   |  29  |  0.9    Ca    10.2<H>      19 Nov 2019 05:43  Mg     2.1     11-19          CARDIAC MARKERS ( 19 Nov 2019 05:43 )  x     / 0.03 ng/mL / x     / x     / x      CARDIAC MARKERS ( 18 Nov 2019 17:43 )  x     / 0.03 ng/mL / x     / x     / x          MEDICATIONS  (STANDING):  aspirin enteric coated 325 milliGRAM(s) Oral daily  atorvastatin 10 milliGRAM(s) Oral at bedtime  chlorhexidine 4% Liquid 1 Application(s) Topical <User Schedule>  cholecalciferol 2000 Unit(s) Oral daily  dextrose 5%. 1000 milliLiter(s) (50 mL/Hr) IV Continuous <Continuous>  dextrose 50% Injectable 12.5 Gram(s) IV Push once  dextrose 50% Injectable 25 Gram(s) IV Push once  diltiazem    milliGRAM(s) Oral daily  enoxaparin Injectable 40 milliGRAM(s) SubCutaneous daily  famotidine    Tablet 20 milliGRAM(s) Oral two times a day  influenza   Vaccine 0.5 milliLiter(s) IntraMuscular once  insulin glargine Injectable (LANTUS) 13 Unit(s) SubCutaneous at bedtime  insulin lispro (HumaLOG) corrective regimen sliding scale   SubCutaneous three times a day before meals  insulin lispro Injectable (HumaLOG) 6 Unit(s) SubCutaneous before breakfast  insulin lispro Injectable (HumaLOG) 6 Unit(s) SubCutaneous before lunch  insulin lispro Injectable (HumaLOG) 6 Unit(s) SubCutaneous before dinner  levothyroxine 112 MICROGram(s) Oral daily  metoprolol tartrate 25 milliGRAM(s) Oral two times a day  polyethylene glycol 3350 17 Gram(s) Oral daily  tacrolimus 0.5 milliGRAM(s) Oral daily    MEDICATIONS  (PRN):  albuterol/ipratropium for Nebulization 3 milliLiter(s) Nebulizer every 4 hours PRN Shortness of Breath and/or Wheezing  ALPRAZolam 0.25 milliGRAM(s) Oral daily PRN anxiety  dextrose 40% Gel 15 Gram(s) Oral once PRN Blood Glucose LESS THAN 70 milliGRAM(s)/deciliter  glucagon  Injectable 1 milliGRAM(s) IntraMuscular once PRN Glucose LESS THAN 70 milligrams/deciliter  zolpidem 5 milliGRAM(s) Oral at bedtime PRN Insomnia

## 2019-11-19 NOTE — PROGRESS NOTE ADULT - ASSESSMENT
78 yo F with hx of CAD/MI s/p PCI with stents and recent CABG (Oct 2, 2019) (currently on PPM), post-op A.fib (after CABG), Chronic Hep-B complicated by HCC s/p liver transplant (10 years ago), HTN, DM II, Hypothyroidism presents to ED with acute onset of SOB 1 hour prior to presentation.    Acute pulm edema / Acute HFpEF mod to severe TR and MR/ recent CABG/CAD  - Continue PO lasix 40 mg BID  - CXR improved since presentation  - troponins downtrending to 0.03  - echo showed mod ot severe TR and MR   - PPM interrogated by EP, (spoke to EP over phone 2 episodes of A fib after cabg, once lasting 28 hours prior to CABG), however per Cardiology due to patient's fall risk, not a candidate for anticoagulation).     HTN:   - c/w current medication    DM type II   - monitor F/S   - Insulin regimen adjusted    Hypothyroidism/ Dyslipidemia   -c/w Synthroid   -c/w Statin    s/p liver transplant history   - c/w home meds     suspected vitamin d deficiency   - c/w supplement    DVT prophylaxis: Lovonox  Diet: DASH  Dispo: from NH, PT pending, awaiting SNF

## 2019-11-19 NOTE — DISCHARGE NOTE NURSING/CASE MANAGEMENT/SOCIAL WORK - PATIENT PORTAL LINK FT
You can access the FollowMyHealth Patient Portal offered by [facility] by registering at the following website: [email or web address]. By joining [email or web address]’s FollowMyHealth portal, you will also be able to view your health information using other applications (apps) compatible with our system.

## 2019-11-19 NOTE — DISCHARGE NOTE PROVIDER - NSDCMRMEDTOKEN_GEN_ALL_CORE_FT
Ambien 5 mg oral tablet: 1 tab(s) orally once a day (at bedtime), As Needed  aspirin 325 mg oral delayed release tablet: 1 tab(s) orally once a day  atorvastatin 10 mg oral tablet: 1 tab(s) orally once a day (at bedtime)  dilTIAZem 240 mg/24 hours oral capsule, extended release: 1 cap(s) orally once a day (hold if BP &lt; 110 and HR &lt; 65)  insulin aspart: 4 UNITS subcutaneous 3 times a day (before meals) BASED ON BLOOD SUGAR  insulin glargine: 12 unit(s) subcutaneous once a day (at bedtime)  levothyroxine 112 mcg (0.112 mg) oral tablet: 1 tab(s) orally once a day  metoprolol tartrate 25 mg oral tablet: 1 tab(s) orally 2 times a day  Pepcid 20 mg oral tablet: 1 tab(s) orally 2 times a day  polyethylene glycol 3350 oral powder for reconstitution: 17 gram(s) orally once a day, As Needed  tacrolimus 0.5 mg oral capsule: 1 cap(s) orally once a day  torsemide 20 mg oral tablet: 2 tab(s) orally once a day  valsartan 80 mg oral tablet: 1 tab(s) orally once a day (hold if BP &lt; 110)  Vitamin D3 2000 intl units oral tablet: 1 tab(s) orally once a day  Xanax 0.25 mg oral tablet: 1 tab(s) orally once a day, As Needed

## 2019-11-19 NOTE — DISCHARGE NOTE PROVIDER - NSDCCPGOAL_GEN_ALL_CORE_FT
Met with pt and her daughter at the bedside. Pt unsure about hh services, however, her daughter wishes her mother to receive them. Brochure provided and any pt/daughter questions answered. Will follow.
To get better and follow your care plan as instructed.

## 2019-11-19 NOTE — DISCHARGE NOTE PROVIDER - CARE PROVIDER_API CALL
Ronn Portillo (MD)  Cardiovascular Disease; Internal Medicine; Interventional Cardiology  [address], [address]  [place] [address]  Phone: [phone]  Fax: [phone]  Follow Up Time:     Didier Pollard (DO)  Internal Medicine  [address] [place] [address]  Phone: [phone]  Fax: [phone]  Follow Up Time:

## 2019-11-19 NOTE — PROGRESS NOTE ADULT - SUBJECTIVE AND OBJECTIVE BOX
SUBJ: Patient is more comfortable. She has diuresed on iv lasix. She does have evidence for COPD with decreased FEV as documented at the time of her CABG      MEDICATIONS  (STANDING):  aspirin enteric coated 325 milliGRAM(s) Oral daily  atorvastatin 10 milliGRAM(s) Oral at bedtime  chlorhexidine 4% Liquid 1 Application(s) Topical <User Schedule>  cholecalciferol 2000 Unit(s) Oral daily  dextrose 5%. 1000 milliLiter(s) (50 mL/Hr) IV Continuous <Continuous>  dextrose 50% Injectable 12.5 Gram(s) IV Push once  dextrose 50% Injectable 25 Gram(s) IV Push once  dextrose 50% Injectable 25 Gram(s) IV Push once  diltiazem    milliGRAM(s) Oral daily  enoxaparin Injectable 40 milliGRAM(s) SubCutaneous daily  famotidine    Tablet 20 milliGRAM(s) Oral two times a day  furosemide    Tablet 40 milliGRAM(s) Oral two times a day  influenza   Vaccine 0.5 milliLiter(s) IntraMuscular once  insulin glargine Injectable (LANTUS) 12 Unit(s) SubCutaneous at bedtime  insulin lispro (HumaLOG) corrective regimen sliding scale   SubCutaneous three times a day before meals  insulin lispro Injectable (HumaLOG) 4 Unit(s) SubCutaneous three times a day before meals  levothyroxine 112 MICROGram(s) Oral daily  metoprolol tartrate 25 milliGRAM(s) Oral two times a day  polyethylene glycol 3350 17 Gram(s) Oral daily  tacrolimus 0.5 milliGRAM(s) Oral daily    MEDICATIONS  (PRN):  albuterol/ipratropium for Nebulization 3 milliLiter(s) Nebulizer every 4 hours PRN Shortness of Breath and/or Wheezing  ALPRAZolam 0.25 milliGRAM(s) Oral daily PRN anxiety  dextrose 40% Gel 15 Gram(s) Oral once PRN Blood Glucose LESS THAN 70 milliGRAM(s)/deciliter  glucagon  Injectable 1 milliGRAM(s) IntraMuscular once PRN Glucose LESS THAN 70 milligrams/deciliter  zolpidem 5 milliGRAM(s) Oral at bedtime PRN Insomnia            Vital Signs Last 24 Hrs  T(C): 36.1 (19 Nov 2019 05:52), Max: 36.6 (18 Nov 2019 10:04)  T(F): 96.9 (19 Nov 2019 05:52), Max: 97.8 (18 Nov 2019 10:04)  HR: 61 (19 Nov 2019 05:52) (61 - 79)  BP: 171/79 (19 Nov 2019 05:52) (108/61 - 171/79)  BP(mean): --  RR: 18 (19 Nov 2019 05:52) (18 - 18)  SpO2: --    REVIEW OF SYSTEMS:  · EXTREMITIES: No cyanosis, clubbing or edema  · VASCULAR: 	Equal and normal pulses (carotid, femoral, dorsalis pedis  TTE:    CONSTITUTIONAL: No fever, weight loss, or fatigue  Patient denies chest pain, shortness of breath or syncopal episodes.       PHYSICAL EXAM:  · CONSTITUTIONAL:	Well-developed, well nourished     ·RESPIRATORY:   airway patent; breath sounds equal; good air movement; respirations non-labored; clear to auscultation bilaterally; no chest wall tenderness; no intercostal retractions; no rales,rhonchi or wheeze  · CARDIOVASCULAR	regular rate and rhythm  no rub  grade II/VI systolic murmur  normal PMI  LABS:                        12.4   12.49 )-----------( 188      ( 19 Nov 2019 05:43 )             38.5     11-19    141  |  99  |  28<H>  ----------------------------<  209<H>  4.2   |  29  |  0.9    Ca    10.2<H>      19 Nov 2019 05:43  Mg     2.1     11-19      CARDIAC MARKERS ( 19 Nov 2019 05:43 )  x     / 0.03 ng/mL / x     / x     / x      CARDIAC MARKERS ( 18 Nov 2019 17:43 )  x     / 0.03 ng/mL / x     / x     / x              I&O's Summary    18 Nov 2019 07:01  -  19 Nov 2019 07:00  --------------------------------------------------------  IN: 450 mL / OUT: 1100 mL / NET: -650 mL      IMPRESSION AND PLAN:  CAD  S/P CABG  Normal LV systolic function  MR/TR  COPD with elevated PASP  DM  PPM  Rec: Maintain diuresis  Patient is a poor candidate for oral anticoagulation due to risk for falls.  This has not changed since her CABG and therapy at [facility].  May d/c telemetry monitoring.  Strict I's and O's with sodium and fluid restriction. Maintain remaining cardiac medications as outlined.  Patient will need better control of her glucose levels.

## 2019-11-21 ENCOUNTER — APPOINTMENT (OUTPATIENT)
Dept: CARE COORDINATION | Facility: HOME HEALTH | Age: 79
End: 2019-11-21
Payer: MEDICARE

## 2019-11-21 DIAGNOSIS — I07.1 RHEUMATIC TRICUSPID INSUFFICIENCY: ICD-10-CM

## 2019-11-21 DIAGNOSIS — Z79.84 LONG TERM (CURRENT) USE OF ORAL HYPOGLYCEMIC DRUGS: ICD-10-CM

## 2019-11-21 DIAGNOSIS — Z87.891 PERSONAL HISTORY OF NICOTINE DEPENDENCE: ICD-10-CM

## 2019-11-21 DIAGNOSIS — I48.91 UNSPECIFIED ATRIAL FIBRILLATION: ICD-10-CM

## 2019-11-21 DIAGNOSIS — I34.0 NONRHEUMATIC MITRAL (VALVE) INSUFFICIENCY: ICD-10-CM

## 2019-11-21 DIAGNOSIS — Z95.1 PRESENCE OF AORTOCORONARY BYPASS GRAFT: ICD-10-CM

## 2019-11-21 DIAGNOSIS — I16.1 HYPERTENSIVE EMERGENCY: ICD-10-CM

## 2019-11-21 DIAGNOSIS — I11.0 HYPERTENSIVE HEART DISEASE WITH HEART FAILURE: ICD-10-CM

## 2019-11-21 DIAGNOSIS — Z95.5 PRESENCE OF CORONARY ANGIOPLASTY IMPLANT AND GRAFT: ICD-10-CM

## 2019-11-21 DIAGNOSIS — Z85.05 PERSONAL HISTORY OF MALIGNANT NEOPLASM OF LIVER: ICD-10-CM

## 2019-11-21 DIAGNOSIS — Z94.4 LIVER TRANSPLANT STATUS: ICD-10-CM

## 2019-11-21 DIAGNOSIS — R06.02 SHORTNESS OF BREATH: ICD-10-CM

## 2019-11-21 DIAGNOSIS — I50.33 ACUTE ON CHRONIC DIASTOLIC (CONGESTIVE) HEART FAILURE: ICD-10-CM

## 2019-11-21 DIAGNOSIS — J44.1 CHRONIC OBSTRUCTIVE PULMONARY DISEASE WITH (ACUTE) EXACERBATION: ICD-10-CM

## 2019-11-21 DIAGNOSIS — J96.02 ACUTE RESPIRATORY FAILURE WITH HYPERCAPNIA: ICD-10-CM

## 2019-11-21 DIAGNOSIS — E11.9 TYPE 2 DIABETES MELLITUS WITHOUT COMPLICATIONS: ICD-10-CM

## 2019-11-21 DIAGNOSIS — E03.9 HYPOTHYROIDISM, UNSPECIFIED: ICD-10-CM

## 2019-11-21 DIAGNOSIS — I25.2 OLD MYOCARDIAL INFARCTION: ICD-10-CM

## 2019-11-21 DIAGNOSIS — J96.01 ACUTE RESPIRATORY FAILURE WITH HYPOXIA: ICD-10-CM

## 2019-11-21 DIAGNOSIS — E55.9 VITAMIN D DEFICIENCY, UNSPECIFIED: ICD-10-CM

## 2019-11-21 DIAGNOSIS — E78.5 HYPERLIPIDEMIA, UNSPECIFIED: ICD-10-CM

## 2019-11-21 DIAGNOSIS — Z95.0 PRESENCE OF CARDIAC PACEMAKER: ICD-10-CM

## 2019-11-21 PROCEDURE — 99024 POSTOP FOLLOW-UP VISIT: CPT

## 2019-11-22 PROBLEM — I25.10 ATHEROSCLEROTIC HEART DISEASE OF NATIVE CORONARY ARTERY WITHOUT ANGINA PECTORIS: Chronic | Status: ACTIVE | Noted: 2019-11-17

## 2019-11-22 PROBLEM — Z86.19 PERSONAL HISTORY OF OTHER INFECTIOUS AND PARASITIC DISEASES: Chronic | Status: ACTIVE | Noted: 2019-11-17

## 2019-11-22 PROBLEM — C22.0 LIVER CELL CARCINOMA: Chronic | Status: ACTIVE | Noted: 2019-11-17

## 2019-11-24 VITALS
DIASTOLIC BLOOD PRESSURE: 70 MMHG | SYSTOLIC BLOOD PRESSURE: 122 MMHG | OXYGEN SATURATION: 96 % | RESPIRATION RATE: 12 BRPM | HEART RATE: 65 BPM

## 2019-11-24 RX ORDER — FLUTICASONE PROPIONATE 50 UG/1
50 SPRAY, METERED NASAL TWICE DAILY
Refills: 0 | Status: DISCONTINUED | COMMUNITY
Start: 2019-10-22 | End: 2019-11-24

## 2019-11-24 RX ORDER — LEVOFLOXACIN 500 MG/1
500 TABLET, FILM COATED ORAL
Refills: 0 | Status: DISCONTINUED | COMMUNITY
End: 2019-11-24

## 2019-11-24 RX ORDER — DILTIAZEM HYDROCHLORIDE 240 MG/1
240 CAPSULE, COATED, EXTENDED RELEASE ORAL DAILY
Refills: 0 | Status: ACTIVE | COMMUNITY
Start: 2019-10-22

## 2019-11-24 RX ORDER — VALSARTAN 80 MG/1
80 TABLET, COATED ORAL DAILY
Refills: 0 | Status: ACTIVE | COMMUNITY
Start: 2019-11-24

## 2019-11-24 RX ORDER — TORSEMIDE 100 MG/1
100 TABLET ORAL DAILY
Refills: 0 | Status: ACTIVE | COMMUNITY
Start: 2019-11-24

## 2019-11-24 RX ORDER — INSULIN GLARGINE 100 [IU]/ML
100 INJECTION, SOLUTION SUBCUTANEOUS AT BEDTIME
Refills: 0 | Status: ACTIVE | COMMUNITY

## 2019-11-24 RX ORDER — METOPROLOL TARTRATE 25 MG/1
25 TABLET, FILM COATED ORAL
Refills: 0 | Status: ACTIVE | COMMUNITY

## 2019-11-24 RX ORDER — INSULIN ASPART 100 [IU]/ML
100 INJECTION, SOLUTION INTRAVENOUS; SUBCUTANEOUS 3 TIMES DAILY
Refills: 0 | Status: ACTIVE | COMMUNITY

## 2019-11-24 NOTE — ASSESSMENT
[FreeTextEntry1] : Education\par 1.  DC instructions reviewed with patient - discussed importance of medications (indication, schedule, side effects, and importance of compliance reviewed) and f/u appointments.\par 2.  Clean incision sites daily - shower indirectly, clean with soap and water, pat dry.  Avoid the use of lotions, ointments, powders, and perfumes on or around incision sites.\par 3.  Sternal precautions - avoid any heavy lifting (>5-10 lbs x 8 weeks), raising both arms above head simultaneously.\par 4.  Place TEDs on in AM prior to getting out of bed and off in PM when returning to bed.\par 5.  Follow a heart healthy diet - low salt, low fat.\par 6.  Exercise daily.\par 7.  Monitor and call [facility] NP for signs and symptoms of infection (redness, drainage, and fever).\par 8.  Monitor daily weights (call for a gain of 2-3 lbs/day or 5-6 lbs/week). \par 9.  Blood sugar control necessary for wound healing if applicable.\par 10.  Avoid straining during BM - use stool softners as needed. \par 11.  Instructed on importance of incentive spirometry use (10x/hour).\par \par Patient verbalized understanding of all education outlined above. Pt instructed to call [facility] NP for any issues as delineated above.\par \par PLAN\par 1.  Monitor daily weights\par 2.  Continue current medications as prescribed\par 3.  Incentive spirometry use\par 4.  Maintain sternal precautions\par 5.  Exercise daily\par 5.  Maintain HH diet\par 6.  Maintain TEDs\par 7.  Keep legs elevated\par 8.  F/U appointments - \par CTSx Dr. Mirza\par 9.  [facility] NP will continue to f/u with patient status - Pt agrees to call with any questions/concerns\par 10. To recover without complications.\par 11.  Continue with PT daily.

## 2019-11-24 NOTE — PHYSICAL EXAM
[Auscultation Breath Sounds / Voice Sounds] : lungs were clear to auscultation bilaterally [Heart Rate And Rhythm] : heart rate was normal and rhythm regular [Heart Sounds Gallop] : no gallops [Heart Sounds] : normal S1 and S2 [Murmurs] : no murmurs [Heart Sounds Pericardial Friction Rub] : no pericardial rub [Examination Of The Chest] : the chest was normal in appearance [Chest Visual Inspection Thoracic Asymmetry] : no chest asymmetry [Diminished Respiratory Excursion] : normal chest expansion [Skin Color & Pigmentation] : normal skin color and pigmentation [Skin Turgor] : normal skin turgor [] : no rash [FreeTextEntry1] : i

## 2019-11-24 NOTE — HISTORY OF PRESENT ILLNESS
[FreeTextEntry1] : FOLLOW YOUR HEART\par \par 11/19:  Hospital Course: 80 yo F with hx of CAD s/p PCI with 5 stents and recent CABG (Oct 2, 2019) (on PPM), post-op A.fib (after CABG), Chronic Hep-B complicated by HCC s/p liver transplant (10 years ago), HTN, DM II, Hypothyroidism presents to ED with acute onset of SOB 1 hour prior to presentation. Patient was at her daughter's house and suddenly feels severe SOB while sitting. So the daughter called the EMS and brought her to ED. Patient reports that she never had similar episodes in the past. Denied fever, chills, recent infection, chest pain, palpitation, abdominal pain, change in bowel movement and change in urination. Patient reports that she is compliance with her medications and she hasn't been drinking much. \par Patient was recently discharged to [facility] from the hospital after CABG. Patient reports that she never had heart attack in the past, was doing fine until she was found to have severe 3V CAD on recent cardiac cath which she went for CABG in Oct 2019. She was admitted for :\par Acute pulm edema / Acute HFpEF mod to severe TR and MR/ recent CABG/CAD\par \par Patient is seen at [facility] for a re-visit s/p hospital readmission.

## 2019-11-26 ENCOUNTER — OUTPATIENT (OUTPATIENT)
Dept: OUTPATIENT SERVICES | Facility: HOSPITAL | Age: 79
LOS: 1 days | Discharge: HOME | End: 2019-11-26

## 2019-11-26 DIAGNOSIS — Z95.1 PRESENCE OF AORTOCORONARY BYPASS GRAFT: Chronic | ICD-10-CM

## 2019-11-26 DIAGNOSIS — Z95.5 PRESENCE OF CORONARY ANGIOPLASTY IMPLANT AND GRAFT: Chronic | ICD-10-CM

## 2019-11-26 DIAGNOSIS — Z94.4 LIVER TRANSPLANT STATUS: Chronic | ICD-10-CM

## 2019-11-26 DIAGNOSIS — Z94.4 LIVER TRANSPLANT STATUS: ICD-10-CM

## 2019-11-26 DIAGNOSIS — Z95.0 PRESENCE OF CARDIAC PACEMAKER: Chronic | ICD-10-CM

## 2019-11-27 ENCOUNTER — APPOINTMENT (OUTPATIENT)
Dept: CARDIOTHORACIC SURGERY | Facility: CLINIC | Age: 79
End: 2019-11-27

## 2019-11-27 VITALS
SYSTOLIC BLOOD PRESSURE: 118 MMHG | OXYGEN SATURATION: 95 % | TEMPERATURE: 98.3 F | HEIGHT: 63 IN | RESPIRATION RATE: 14 BRPM | DIASTOLIC BLOOD PRESSURE: 72 MMHG | HEART RATE: 88 BPM

## 2019-12-18 ENCOUNTER — APPOINTMENT (OUTPATIENT)
Dept: CARDIOTHORACIC SURGERY | Facility: CLINIC | Age: 79
End: 2019-12-18
Payer: MEDICARE

## 2019-12-18 VITALS
SYSTOLIC BLOOD PRESSURE: 143 MMHG | TEMPERATURE: 98.1 F | OXYGEN SATURATION: 96 % | WEIGHT: 140 LBS | RESPIRATION RATE: 12 BRPM | BODY MASS INDEX: 24.8 KG/M2 | HEIGHT: 63 IN | DIASTOLIC BLOOD PRESSURE: 56 MMHG | HEART RATE: 74 BPM

## 2019-12-18 DIAGNOSIS — Z95.1 PRESENCE OF AORTOCORONARY BYPASS GRAFT: ICD-10-CM

## 2019-12-18 PROCEDURE — 99024 POSTOP FOLLOW-UP VISIT: CPT

## 2019-12-18 RX ORDER — HEPARIN SODIUM 5000 [USP'U]/ML
5000 INJECTION, SOLUTION INTRAVENOUS; SUBCUTANEOUS EVERY 8 HOURS
Refills: 0 | Status: DISCONTINUED | COMMUNITY
Start: 2019-10-22 | End: 2019-12-18

## 2019-12-18 RX ORDER — DOCUSATE SODIUM 100 MG/1
100 CAPSULE, LIQUID FILLED ORAL
Refills: 0 | Status: DISCONTINUED | COMMUNITY
Start: 2019-10-22 | End: 2019-12-18

## 2020-02-03 ENCOUNTER — APPOINTMENT (OUTPATIENT)
Dept: GERIATRICS | Facility: HOSPITAL | Age: 80
End: 2020-02-03
Payer: MEDICARE

## 2020-02-03 VITALS
TEMPERATURE: 97.1 F | HEIGHT: 61 IN | RESPIRATION RATE: 16 BRPM | SYSTOLIC BLOOD PRESSURE: 118 MMHG | WEIGHT: 138 LBS | BODY MASS INDEX: 26.06 KG/M2 | DIASTOLIC BLOOD PRESSURE: 74 MMHG | HEART RATE: 88 BPM

## 2020-02-03 DIAGNOSIS — Z86.39 PERSONAL HISTORY OF OTHER ENDOCRINE, NUTRITIONAL AND METABOLIC DISEASE: ICD-10-CM

## 2020-02-03 DIAGNOSIS — Z80.49 FAMILY HISTORY OF MALIGNANT NEOPLASM OF OTHER GENITAL ORGANS: ICD-10-CM

## 2020-02-03 DIAGNOSIS — Z82.49 FAMILY HISTORY OF ISCHEMIC HEART DISEASE AND OTHER DISEASES OF THE CIRCULATORY SYSTEM: ICD-10-CM

## 2020-02-03 DIAGNOSIS — Z86.19 PERSONAL HISTORY OF OTHER INFECTIOUS AND PARASITIC DISEASES: ICD-10-CM

## 2020-02-03 DIAGNOSIS — Z95.1 PRESENCE OF AORTOCORONARY BYPASS GRAFT: ICD-10-CM

## 2020-02-03 DIAGNOSIS — Z87.891 PERSONAL HISTORY OF NICOTINE DEPENDENCE: ICD-10-CM

## 2020-02-03 DIAGNOSIS — M19.90 UNSPECIFIED OSTEOARTHRITIS, UNSPECIFIED SITE: ICD-10-CM

## 2020-02-03 PROCEDURE — 99204 OFFICE O/P NEW MOD 45 MIN: CPT | Mod: GC

## 2020-02-03 RX ORDER — ALPRAZOLAM 0.25 MG/1
0.25 TABLET ORAL DAILY
Refills: 0 | Status: COMPLETED | COMMUNITY
Start: 2019-11-24 | End: 2020-02-03

## 2020-02-03 RX ORDER — MELOXICAM 7.5 MG/1
7.5 TABLET ORAL
Qty: 30 | Refills: 3 | Status: ACTIVE | COMMUNITY
Start: 2020-02-03 | End: 1900-01-01

## 2020-02-03 NOTE — PHYSICAL EXAM
[General Appearance - In No Acute Distress] : in no acute distress [General Appearance - Alert] : alert [General Appearance - Well Nourished] : well nourished [Sclera] : the sclera and conjunctiva were normal [Extraocular Movements] : extraocular movements were intact [Neck Appearance] : the appearance of the neck was normal [Outer Ear] : the ears and nose were normal in appearance [Heart Rate And Rhythm] : heart rate was normal and rhythm regular [Respiration, Rhythm And Depth] : normal respiratory rhythm and effort [Abdomen Soft] : soft [Abdomen Tenderness] : non-tender [Nail Clubbing] : no clubbing  or cyanosis of the fingernails [Skin Color & Pigmentation] : normal skin color and pigmentation [] : no rash [Motor Exam] : the motor exam was normal [Sensation] : the sensory exam was normal to light touch and pinprick [No Focal Deficits] : no focal deficits

## 2020-02-04 ENCOUNTER — OUTPATIENT (OUTPATIENT)
Dept: OUTPATIENT SERVICES | Facility: HOSPITAL | Age: 80
LOS: 1 days | Discharge: HOME | End: 2020-02-04

## 2020-02-04 DIAGNOSIS — Z95.5 PRESENCE OF CORONARY ANGIOPLASTY IMPLANT AND GRAFT: Chronic | ICD-10-CM

## 2020-02-04 DIAGNOSIS — Z95.1 PRESENCE OF AORTOCORONARY BYPASS GRAFT: Chronic | ICD-10-CM

## 2020-02-04 DIAGNOSIS — Z94.4 LIVER TRANSPLANT STATUS: Chronic | ICD-10-CM

## 2020-02-04 DIAGNOSIS — Z95.0 PRESENCE OF CARDIAC PACEMAKER: Chronic | ICD-10-CM

## 2020-02-05 DIAGNOSIS — Z95.1 PRESENCE OF AORTOCORONARY BYPASS GRAFT: ICD-10-CM

## 2020-02-05 DIAGNOSIS — Z80.49 FAMILY HISTORY OF MALIGNANT NEOPLASM OF OTHER GENITAL ORGANS: ICD-10-CM

## 2020-02-05 DIAGNOSIS — M19.90 UNSPECIFIED OSTEOARTHRITIS, UNSPECIFIED SITE: ICD-10-CM

## 2020-02-05 DIAGNOSIS — Z82.49 FAMILY HISTORY OF ISCHEMIC HEART DISEASE AND OTHER DISEASES OF THE CIRCULATORY SYSTEM: ICD-10-CM

## 2020-02-05 DIAGNOSIS — Z87.39 PERSONAL HISTORY OF OTHER DISEASES OF THE MUSCULOSKELETAL SYSTEM AND CONNECTIVE TISSUE: ICD-10-CM

## 2020-02-05 DIAGNOSIS — Z86.39 PERSONAL HISTORY OF OTHER ENDOCRINE, NUTRITIONAL AND METABOLIC DISEASE: ICD-10-CM

## 2020-02-05 DIAGNOSIS — Z86.19 PERSONAL HISTORY OF OTHER INFECTIOUS AND PARASITIC DISEASES: ICD-10-CM

## 2020-02-05 NOTE — REVIEW OF SYSTEMS
[Feeling Tired] : feeling tired [As Noted in HPI] : as noted in HPI [Negative] : Gastrointestinal [FreeTextEntry2] : Decreased PO intake

## 2020-02-05 NOTE — ASSESSMENT
[FreeTextEntry1] : Ms. Blanco is an 79 year old female with PMHx of CAD/MI s/p PCI, CABG, AFib, unspecified arrhythmia s/p PPM, DMII, HTN, hypothyroidism, DLD, arthritis, HCC due to HepB as a child s/p liver transplant 2010, MR/TR/HFpEF, COPD? presents for initial evaluation.\par \par CAD s/p PCI and CABG (2019)\par - Will continue with current medications (ASA, statin, CCB, ARB)\par - Needs cardiology follow-up\par \par Arthritis:\par - Tylenol\par - Meloxicam 7.5 mg QD\par \par AFib and arrhythmia s/p PPM, no AC\par - C/w cardizem\par - No AC per cardio due to fall risk\par \par HFpEF due to MR/TR: \par - C/w torsemide\par \par DMII: Currently on insulin\par - Will get records from other provider and will try to get off insulin\par - Patient currently taking 4 insulin injections daily and checking blood glucose 4 times daily with Freestyle Nishant\par \par Hypothyroidism:\par - Check records\par - C/w synthroid\par \par HTN: well controlled\par - C/w current meds\par \par HepB/HCC s/p liver transplant: \par - C/w current tacrolimus dose\par - Get records of recent levels\par \par Poor PO intake:\par - Will consider mirtazipine if no improvement at next visit\par \par Tremor likely Prograf (35-55% of patients get tremor) vs new benign essential tremor:\par - May switch from cardizem to BB\par \par Telemed f/u after records are received; may order bloodwork if can't get all the results\par \par \par  [Daily physical exercise as tolerated] : Daily physical exercise as tolerated [Medication Management] : medication management

## 2020-02-11 ENCOUNTER — EMERGENCY (EMERGENCY)
Facility: HOSPITAL | Age: 80
LOS: 0 days | Discharge: HOME | End: 2020-02-12
Attending: EMERGENCY MEDICINE | Admitting: EMERGENCY MEDICINE
Payer: MEDICARE

## 2020-02-11 VITALS
SYSTOLIC BLOOD PRESSURE: 167 MMHG | OXYGEN SATURATION: 97 % | WEIGHT: 134.92 LBS | RESPIRATION RATE: 22 BRPM | DIASTOLIC BLOOD PRESSURE: 80 MMHG | HEART RATE: 87 BPM | HEIGHT: 62 IN | TEMPERATURE: 99 F

## 2020-02-11 DIAGNOSIS — I25.10 ATHEROSCLEROTIC HEART DISEASE OF NATIVE CORONARY ARTERY WITHOUT ANGINA PECTORIS: ICD-10-CM

## 2020-02-11 DIAGNOSIS — Z91.09 OTHER ALLERGY STATUS, OTHER THAN TO DRUGS AND BIOLOGICAL SUBSTANCES: ICD-10-CM

## 2020-02-11 DIAGNOSIS — E11.9 TYPE 2 DIABETES MELLITUS WITHOUT COMPLICATIONS: ICD-10-CM

## 2020-02-11 DIAGNOSIS — Z95.0 PRESENCE OF CARDIAC PACEMAKER: ICD-10-CM

## 2020-02-11 DIAGNOSIS — Z95.0 PRESENCE OF CARDIAC PACEMAKER: Chronic | ICD-10-CM

## 2020-02-11 DIAGNOSIS — E03.9 HYPOTHYROIDISM, UNSPECIFIED: ICD-10-CM

## 2020-02-11 DIAGNOSIS — Z95.1 PRESENCE OF AORTOCORONARY BYPASS GRAFT: Chronic | ICD-10-CM

## 2020-02-11 DIAGNOSIS — Z95.5 PRESENCE OF CORONARY ANGIOPLASTY IMPLANT AND GRAFT: ICD-10-CM

## 2020-02-11 DIAGNOSIS — Z79.4 LONG TERM (CURRENT) USE OF INSULIN: ICD-10-CM

## 2020-02-11 DIAGNOSIS — Z95.1 PRESENCE OF AORTOCORONARY BYPASS GRAFT: ICD-10-CM

## 2020-02-11 DIAGNOSIS — I50.30 UNSPECIFIED DIASTOLIC (CONGESTIVE) HEART FAILURE: ICD-10-CM

## 2020-02-11 DIAGNOSIS — R06.02 SHORTNESS OF BREATH: ICD-10-CM

## 2020-02-11 DIAGNOSIS — I11.0 HYPERTENSIVE HEART DISEASE WITH HEART FAILURE: ICD-10-CM

## 2020-02-11 DIAGNOSIS — Z95.5 PRESENCE OF CORONARY ANGIOPLASTY IMPLANT AND GRAFT: Chronic | ICD-10-CM

## 2020-02-11 DIAGNOSIS — Z94.4 LIVER TRANSPLANT STATUS: Chronic | ICD-10-CM

## 2020-02-11 DIAGNOSIS — Z94.4 LIVER TRANSPLANT STATUS: ICD-10-CM

## 2020-02-11 DIAGNOSIS — Z79.82 LONG TERM (CURRENT) USE OF ASPIRIN: ICD-10-CM

## 2020-02-11 PROCEDURE — 99285 EMERGENCY DEPT VISIT HI MDM: CPT

## 2020-02-11 PROCEDURE — 71045 X-RAY EXAM CHEST 1 VIEW: CPT | Mod: 26

## 2020-02-11 NOTE — ED ADULT TRIAGE NOTE - RESPIRATORY RATE (BREATHS/MIN)
Internal Medicine Progress note       Patient: Clearance Camp Date:3/9/2019     : 1941 Attending: Bill Carolina MD   77year old female 3/1/2019          CC:          Recent Events/Subjective:     Resting in bed  Reports intermittent shortness of breath  Better today  On 2-3 L NC O2 with pox 100%  Pain conrolled   No fevers, leukocytosis resolved   Renal function stable at baseline   CT abdomen noted with resolved SBO     Nursing with some concern for vaginal bleeding  H&H stable            Vital Last Value 24 Hour Range   Temperature 98.5 Â°F (36.9 Â°C) Temp  Min: 98.1 Â°F (36.7 Â°C)  Max: 98.5 Â°F (36.9 Â°C)   Pulse 76 Pulse  Min: 73  Max: 76   Respiratory 22 Resp  Min: 20  Max: 22   Blood Pressure 159/69 BP  Min: 135/60  Max: 159/69   Arterial BP   No Data Recorded   O2 Sat 3 L/min NA   Pulse Oximetry 97 % SpO2  Min: 97 %  Max: 100 %     Vital Today Admitted   Weight 83 kg Weight: 83.7 kg   BMI N/A BMI (Calculated): 29.78          Last I/O 3 shifts  I/O last 3 completed shifts: In:  [P.O.:680; I.V.:287]  Out: 3300 [Urine:3300]    Physical Exam:       General appearance: weak, ill appeaing, some dyspnea - non distressed   Head: Normocephalic,  Atraumatic, no obvious abnormalities   Eyes: PERRL, no drainage, exudates or erythema   Neck: supple, full ROM, no JVD, trachea midline,   Lungs: normal efforts, symmetrical expansion, coarse, rhonchi, wheezing intermittent   Heart: RRR and S1, S2 normal   Abdomen: soft, NT/ND, hypoactive    Extremities: no edema/cyanosis, warm, no cyanosis or clubbing   Pulses: 2+ and symmetric radial, dorsalis pedis   Skin: WDI, no rashes, lesions or wounds   Neurologic: awake.  Alert, oriented x3, Spencer, generalized weakness         Labs  Recent Labs   Lab 19  1403 19  0354 19  0532 19  0955 19  0507  19  0415  19  0402   WBC 10.0  --  11.6* 15.2*  --   --   --    < > 8.4   HGB 7.5*  --  7.6* 8.6*  --   --   --    < > 9.5*   HCT 22.6*  -- 24.2* 27.7*  --   --   --    < > 31.1*     --  185 232  --   --   --    < > 242   SEG  --   --   --  77  --   --   --   --  80   SODIUM  --  139 139  --   --   --  144   < > 152*   POTASSIUM  --  4.3 4.1  --  3.7   < > 3.1*   < > 3.6   CHLORIDE  --  104 104  --   --   --  107   < > 113*   CO2  --  30 30  --   --   --  32   < > 32   ANIONGAP  --  9* 9*  --   --   --  8*   < > 11   BUN  --  65* 61*  --   --   --  43*   < > 37*   CREATININE  --  1.26* 1.22*  --   --   --  1.20*   < > 1.57*   GFRNA  --  40 42  --   --   --  43   < > 31   GFRA  --  47 49  --   --   --  50   < > 36   GLUCOSE  --  173* 166*  --   --   --  171*   < > 148*   CALCIUM  --  8.5 8.8  --   --   --  7.9*   < > 8.6   ALBUMIN  --   --   --   --   --   --   --   --  2.8*   PHOS  --  4.0 3.7  --   --   --  2.5   < >  --    MG  --  2.2 2.0  --  1.9  --  1.9   < >  --    AST  --   --   --   --   --   --   --   --  19   GPT  --   --   --   --   --   --   --   --  14   ALKPT  --   --   --   --   --   --   --   --  89   BILIRUBIN  --   --   --   --   --   --   --   --  0.5    < > = values in this interval not displayed.          Medications/Infusions:       Scheduled:   â¢ docusate calcium  240 mg Oral Daily   â¢ guaifenesin  400 mg Oral BID   â¢ furosemide  40 mg Intravenous BID   â¢ albuterol-ipratropium 2.5 mg/0.5 mg  3 mL Nebulization 4x Daily Resp   â¢ fat emulsion  250 mL Intravenous Q24H   â¢ sodium chloride (PF)  10 mL Injection 3 times per day   â¢ metoPROLOL  2.5 mg Intravenous 4 times per day   â¢ pantoprazole  40 mg Intravenous 2 times per day   â¢ fluticasone  1 spray Each Nare Daily   â¢ sodium chloride (PF)  2 mL Injection 2 times per day   â¢ insulin lispro   Subcutaneous 4 times per day       Continuous Infusions:   â¢ parenteral nutrition adult non-standard with non-standard electrolytes (minimum volume)     â¢ parenteral nutrition adult non-standard with custom electrolytes 65 mL/hr at 03/08/19 2240   â¢ dextrose 10 % infusion     â¢ heparin (porcine) 37710 units/500 mL dextrose 5% standard infusion 11 Units/kg/hr (03/09/19 1651)   â¢ dextrose 10 % infusion     â¢ dextrose 5 % infusion       . Radiology/Imaging: reviewed              Assessment:   Respiratory insufficiency, multifactorial- pulmonary edema, effusions, atelectasis, possible refluxing aspiration. 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 Hypernatremia  Prolonged QTC   Hypernatremia     Plan & Recommendations:   Monitor pulm status, wean O2 as able  Diuresis IV  PEP therapy, mucolytics, IS   Increase activity, ambulation   Follow bowel function, surgery follow up   Diet per surgery   TPN wean in progress  Pain control - CPM   Monitor mentation  PPI,  Continue heparin gtt; resume eliquis soon  DC rectal ASA  Supportive Rx        Enedina Frost NP  3/9/2019    I saw and evaluated the patient personally. I performed the history, exam and medical decision making & reviewed all radiology/cardiac & lab data for this encounter and agree with the above note & updated. Assessment and plan updated as appropriate.       Gregor Lentz MD 22

## 2020-02-12 VITALS
OXYGEN SATURATION: 98 % | SYSTOLIC BLOOD PRESSURE: 148 MMHG | HEART RATE: 65 BPM | DIASTOLIC BLOOD PRESSURE: 78 MMHG | RESPIRATION RATE: 18 BRPM

## 2020-02-12 LAB
ALBUMIN SERPL ELPH-MCNC: 4.1 G/DL — SIGNIFICANT CHANGE UP (ref 3.5–5.2)
ALP SERPL-CCNC: 122 U/L — HIGH (ref 30–115)
ALT FLD-CCNC: 22 U/L — SIGNIFICANT CHANGE UP (ref 0–41)
ANION GAP SERPL CALC-SCNC: 11 MMOL/L — SIGNIFICANT CHANGE UP (ref 7–14)
AST SERPL-CCNC: 23 U/L — SIGNIFICANT CHANGE UP (ref 0–41)
BILIRUB SERPL-MCNC: 0.2 MG/DL — SIGNIFICANT CHANGE UP (ref 0.2–1.2)
BUN SERPL-MCNC: 24 MG/DL — HIGH (ref 10–20)
CALCIUM SERPL-MCNC: 10.2 MG/DL — HIGH (ref 8.5–10.1)
CHLORIDE SERPL-SCNC: 94 MMOL/L — LOW (ref 98–110)
CO2 SERPL-SCNC: 30 MMOL/L — SIGNIFICANT CHANGE UP (ref 17–32)
CREAT SERPL-MCNC: 1.2 MG/DL — SIGNIFICANT CHANGE UP (ref 0.7–1.5)
GLUCOSE SERPL-MCNC: 134 MG/DL — HIGH (ref 70–99)
HCT VFR BLD CALC: 35.3 % — LOW (ref 37–47)
HGB BLD-MCNC: 11.6 G/DL — LOW (ref 12–16)
MAGNESIUM SERPL-MCNC: 1.7 MG/DL — LOW (ref 1.8–2.4)
MCHC RBC-ENTMCNC: 28 PG — SIGNIFICANT CHANGE UP (ref 27–31)
MCHC RBC-ENTMCNC: 32.9 G/DL — SIGNIFICANT CHANGE UP (ref 32–37)
MCV RBC AUTO: 85.3 FL — SIGNIFICANT CHANGE UP (ref 81–99)
NRBC # BLD: 0 /100 WBCS — SIGNIFICANT CHANGE UP (ref 0–0)
NT-PROBNP SERPL-SCNC: 1087 PG/ML — HIGH (ref 0–300)
PLATELET # BLD AUTO: 205 K/UL — SIGNIFICANT CHANGE UP (ref 130–400)
POTASSIUM SERPL-MCNC: 4.9 MMOL/L — SIGNIFICANT CHANGE UP (ref 3.5–5)
POTASSIUM SERPL-SCNC: 4.9 MMOL/L — SIGNIFICANT CHANGE UP (ref 3.5–5)
PROT SERPL-MCNC: 6.7 G/DL — SIGNIFICANT CHANGE UP (ref 6–8)
RBC # BLD: 4.14 M/UL — LOW (ref 4.2–5.4)
RBC # FLD: 13.7 % — SIGNIFICANT CHANGE UP (ref 11.5–14.5)
SODIUM SERPL-SCNC: 135 MMOL/L — SIGNIFICANT CHANGE UP (ref 135–146)
TROPONIN T SERPL-MCNC: 0.02 NG/ML — HIGH
WBC # BLD: 7.61 K/UL — SIGNIFICANT CHANGE UP (ref 4.8–10.8)
WBC # FLD AUTO: 7.61 K/UL — SIGNIFICANT CHANGE UP (ref 4.8–10.8)

## 2020-02-12 RX ORDER — FUROSEMIDE 40 MG
40 TABLET ORAL ONCE
Refills: 0 | Status: COMPLETED | OUTPATIENT
Start: 2020-02-12 | End: 2020-02-12

## 2020-02-12 RX ADMIN — Medication 40 MILLIGRAM(S): at 02:44

## 2020-02-12 NOTE — ED PROVIDER NOTE - OBJECTIVE STATEMENT
79 year old female past medical history of Coronary Artery Disease, CABG, CHF  comes to emergency room for shortness of breath worse when she is walking and laying flat. patient states it got worse after a eating a heavily seasoned meal. No chest pain.

## 2020-02-12 NOTE — ED ADULT NURSE NOTE - PMH
CAD (coronary artery disease)    Diabetes    HCC (hepatocellular carcinoma)    History of hepatitis B    HTN (hypertension)    Hypothyroid    Liver transplant status    SOB (shortness of breath)

## 2020-02-12 NOTE — ED PROVIDER NOTE - CLINICAL SUMMARY MEDICAL DECISION MAKING FREE TEXT BOX
79yF recent cabg  afib  ( no ac  for falls)  liver transplant,  DM CHF HfpeF severe TR .MR  p pw  SOB 0  no chest pain trop negative no  acute ischemia on ekg.  BNP elevated  pt admits to increased salt in her diet.  takes torsemide compliant,  lasix given IV in ED - continued  outpt follow up  and counselled on diet restrictions   Patient to be discharged from ED. Any available test results were discussed with and printed  for patient.  Verbal instructions given, including instructions to return to ED immediately for any new, worsening, or concerning symptoms. Limitations of ED work up discussed.  Patient reports understanding of above with capacity and insight. Written discharge instructions additionally given, including follow-up plan.

## 2020-02-12 NOTE — ED PROVIDER NOTE - PHYSICAL EXAMINATION
Physical Exam    Vital Signs: I have reviewed the initial vital signs.  Constitutional: well-nourished, appears stated age, no acute distress  Eyes: Conjunctiva pink, Sclera clear, PERRLA, EOMI.  Cardiovascular: S1 and S2, regular rate, regular rhythm, well-perfused extremities, radial pulses equal and 2+  Respiratory: unlabored respiratory effort, clear to auscultation bilaterally no wheezing, rales and rhonchi  Gastrointestinal: soft, non-tender abdomen, no pulsatile mass, normal bowl sounds  Musculoskeletal: supple neck, +b/l lower extremity edema, no midline tenderness  Integumentary: warm, dry, no rash  Neurologic: awake, alert, cranial nerves II-XII grossly intact, extremities’ motor and sensory functions grossly intact  Psychiatric: appropriate mood, appropriate affect

## 2020-02-12 NOTE — ED PROVIDER NOTE - NSFOLLOWUPINSTRUCTIONS_ED_ALL_ED_FT
Follow up with your primary care doctor and your cardiolgist in 1-2 days     Congestive Heart Failure (CHF)    Congestive heart failure is a chronic condition in which the heart has trouble pumping blood. This means your heart does not pump blood efficiently for your body to work well. In some cases of heart failure, fluid may back up into your lungs or you may have swelling (edema) in your lower legs. There are many causes of heart failure including high blood pressure, coronary artery disease, abnormal heart valves, heart muscle disease, lung disease, diabetes, etc. Symptoms include shortness of breath with activity or when lying flat, cough, swelling of the legs, fatigue, or increased urination during the night.     Treatment is aimed at managing the symptoms of heart failure and may include lifestyle changes, medications, or surgical procedures. Take medicines only as directed by your health care provider and do not stop unless instructed to do so. Eat heart-healthy foods with low trans/saturated fats, cholesterol and salt. Weigh yourself every day for early recognition of fluid accumulation.      SEEK IMMEDIATE MEDICAL CARE IF YOU HAVE THE FOLLOWING SYMPTOMS: shortness of breath, change in mental status, chest pain, lightheadedness/dizziness/fainting, or worsening of symptoms including not being able to conduct normal physical activity.

## 2020-02-12 NOTE — ED PROVIDER NOTE - PATIENT PORTAL LINK FT
You can access the FollowMyHealth Patient Portal offered by [facility] by registering at the following website: [email or web address]. By joining [facility]’s FollowMyHealth portal, you will also be able to view your health information using other applications (apps) compatible with our system.

## 2020-03-04 ENCOUNTER — OUTPATIENT (OUTPATIENT)
Dept: OUTPATIENT SERVICES | Facility: HOSPITAL | Age: 80
LOS: 1 days | Discharge: HOME | End: 2020-03-04

## 2020-03-04 DIAGNOSIS — Z95.1 PRESENCE OF AORTOCORONARY BYPASS GRAFT: Chronic | ICD-10-CM

## 2020-03-04 DIAGNOSIS — M17.12 UNILATERAL PRIMARY OSTEOARTHRITIS, LEFT KNEE: ICD-10-CM

## 2020-03-04 DIAGNOSIS — R26.9 UNSPECIFIED ABNORMALITIES OF GAIT AND MOBILITY: ICD-10-CM

## 2020-03-04 DIAGNOSIS — M66.211 SPONTANEOUS RUPTURE OF EXTENSOR TENDONS, RIGHT SHOULDER: ICD-10-CM

## 2020-03-04 DIAGNOSIS — Z94.4 LIVER TRANSPLANT STATUS: Chronic | ICD-10-CM

## 2020-03-04 DIAGNOSIS — Z95.0 PRESENCE OF CARDIAC PACEMAKER: Chronic | ICD-10-CM

## 2020-03-04 DIAGNOSIS — Z95.5 PRESENCE OF CORONARY ANGIOPLASTY IMPLANT AND GRAFT: Chronic | ICD-10-CM

## 2021-01-01 NOTE — ED PROVIDER NOTE - PSH
Statement Selected H/O heart artery stent  X5  Liver transplanted    Pacemaker  Taamkru- last interogated 1 m ago  S/P CABG (coronary artery bypass graft)

## 2021-05-09 NOTE — PROGRESS NOTE ADULT - ASSESSMENT
Assessment/Plan:  CAD-s/p CABG x 5-POD #2  1-BP control- beta-blockers  2-serum glucose control-insulin infusion d/c change to lantus and novlog  3-acute blood loss anemia-transfused 2 units pRBCs-f/u repeat CBC  0-sewxijrquvppaajh-fgedvv, continue to monitor plts daily  5-hx liver transplant-restart Tacrolimus  3-abjikisvwii-wjtnjytf synthroid  7- new A fib alfreda stated to slaow Vent response    pt refused cardioversion me and DR mer rodriguezologist spoke to her - losartan - c/w home levothyroxine   - tsh wnl

## 2022-03-06 NOTE — ED ADULT TRIAGE NOTE - STATUS:
Intact Airway patent, Nasal mucosa clear. Mouth with normal mucosa. Throat has no vesicles, no oropharyngeal exudates and uvula is midline.

## 2023-09-11 NOTE — PRE-OP CHECKLIST - COMMENTS
6 am meds with sip of water
3
Additional Notes: Patient consent was obtained to proceed with the visit and recommended plan of care after discussion of all risks and benefits, including the risks of COVID-19 exposure.
Detail Level: Simple
Render Risk Assessment In Note?: yes

## 2023-09-21 NOTE — ED ADULT TRIAGE NOTE - RESPIRATORY RATE (BREATHS/MIN)
Patient states she seen Dr. Ronaldo Mcgarry 9/11 and a lump in right breast was felt. I let her know I will forward to [facility] to address and will get back with her.
Pt states she spoke with you regarding a mammogram for November but she states she feels a lump in her Rt breast and would like to speak with you again    Cc: Gloria Wallis MA
18

## 2025-04-08 NOTE — PATIENT PROFILE ADULT - NSPROMEDSPATCH_GEN_A_NUR
Signed ZORAIDA recv'd. Collateral contact call placed to Pts grandmother. Writer left  requesting return call    .Jeancarlos Waller LCSW    none